# Patient Record
Sex: FEMALE | Race: WHITE | NOT HISPANIC OR LATINO | Employment: OTHER | URBAN - METROPOLITAN AREA
[De-identification: names, ages, dates, MRNs, and addresses within clinical notes are randomized per-mention and may not be internally consistent; named-entity substitution may affect disease eponyms.]

---

## 2020-10-27 ENCOUNTER — TRANSFERRED RECORDS (OUTPATIENT)
Dept: HEALTH INFORMATION MANAGEMENT | Facility: CLINIC | Age: 75
End: 2020-10-27

## 2020-10-28 ENCOUNTER — TRANSFERRED RECORDS (OUTPATIENT)
Dept: HEALTH INFORMATION MANAGEMENT | Facility: CLINIC | Age: 75
End: 2020-10-28

## 2020-12-04 ENCOUNTER — TRANSFERRED RECORDS (OUTPATIENT)
Dept: HEALTH INFORMATION MANAGEMENT | Facility: CLINIC | Age: 75
End: 2020-12-04

## 2022-10-15 ENCOUNTER — TRANSFERRED RECORDS (OUTPATIENT)
Dept: HEALTH INFORMATION MANAGEMENT | Facility: CLINIC | Age: 77
End: 2022-10-15

## 2022-10-16 ENCOUNTER — TRANSFERRED RECORDS (OUTPATIENT)
Dept: HEALTH INFORMATION MANAGEMENT | Facility: CLINIC | Age: 77
End: 2022-10-16

## 2022-10-17 ENCOUNTER — TRANSFERRED RECORDS (OUTPATIENT)
Dept: HEALTH INFORMATION MANAGEMENT | Facility: CLINIC | Age: 77
End: 2022-10-17

## 2023-07-24 ENCOUNTER — TRANSFERRED RECORDS (OUTPATIENT)
Dept: HEALTH INFORMATION MANAGEMENT | Facility: CLINIC | Age: 78
End: 2023-07-24

## 2023-09-18 ENCOUNTER — TRANSFERRED RECORDS (OUTPATIENT)
Dept: HEALTH INFORMATION MANAGEMENT | Facility: CLINIC | Age: 78
End: 2023-09-18

## 2023-09-20 ENCOUNTER — TRANSFERRED RECORDS (OUTPATIENT)
Dept: HEALTH INFORMATION MANAGEMENT | Facility: CLINIC | Age: 78
End: 2023-09-20

## 2023-11-30 ENCOUNTER — TRANSFERRED RECORDS (OUTPATIENT)
Dept: HEALTH INFORMATION MANAGEMENT | Facility: CLINIC | Age: 78
End: 2023-11-30

## 2024-01-01 ENCOUNTER — APPOINTMENT (OUTPATIENT)
Dept: SPEECH THERAPY | Facility: SKILLED NURSING FACILITY | Age: 79
DRG: 947 | End: 2024-01-01
Attending: INTERNAL MEDICINE
Payer: MEDICARE

## 2024-01-01 ENCOUNTER — APPOINTMENT (OUTPATIENT)
Dept: PHYSICAL THERAPY | Facility: SKILLED NURSING FACILITY | Age: 79
DRG: 947 | End: 2024-01-01
Attending: INTERNAL MEDICINE
Payer: MEDICARE

## 2024-01-01 ENCOUNTER — APPOINTMENT (OUTPATIENT)
Dept: OCCUPATIONAL THERAPY | Facility: CLINIC | Age: 79
DRG: 871 | End: 2024-01-01
Attending: STUDENT IN AN ORGANIZED HEALTH CARE EDUCATION/TRAINING PROGRAM
Payer: MEDICARE

## 2024-01-01 ENCOUNTER — APPOINTMENT (OUTPATIENT)
Dept: OCCUPATIONAL THERAPY | Facility: SKILLED NURSING FACILITY | Age: 79
DRG: 947 | End: 2024-01-01
Attending: INTERNAL MEDICINE
Payer: MEDICARE

## 2024-01-01 ENCOUNTER — APPOINTMENT (OUTPATIENT)
Dept: PHYSICAL THERAPY | Facility: CLINIC | Age: 79
DRG: 193 | End: 2024-01-01
Attending: STUDENT IN AN ORGANIZED HEALTH CARE EDUCATION/TRAINING PROGRAM
Payer: MEDICARE

## 2024-01-01 ENCOUNTER — APPOINTMENT (OUTPATIENT)
Dept: PHYSICAL THERAPY | Facility: CLINIC | Age: 79
DRG: 871 | End: 2024-01-01
Payer: MEDICARE

## 2024-01-01 ENCOUNTER — HOSPITAL ENCOUNTER (INPATIENT)
Facility: SKILLED NURSING FACILITY | Age: 79
LOS: 7 days | DRG: 947 | End: 2025-01-02
Attending: INTERNAL MEDICINE | Admitting: INTERNAL MEDICINE
Payer: MEDICARE

## 2024-01-01 ENCOUNTER — APPOINTMENT (OUTPATIENT)
Dept: CARDIOLOGY | Facility: CLINIC | Age: 79
DRG: 871 | End: 2024-01-01
Attending: INTERNAL MEDICINE
Payer: MEDICARE

## 2024-01-01 ENCOUNTER — APPOINTMENT (OUTPATIENT)
Dept: GENERAL RADIOLOGY | Facility: CLINIC | Age: 79
DRG: 193 | End: 2024-01-01
Attending: STUDENT IN AN ORGANIZED HEALTH CARE EDUCATION/TRAINING PROGRAM
Payer: MEDICARE

## 2024-01-01 ENCOUNTER — APPOINTMENT (OUTPATIENT)
Dept: OCCUPATIONAL THERAPY | Facility: CLINIC | Age: 79
DRG: 871 | End: 2024-01-01
Payer: MEDICARE

## 2024-01-01 LAB
ALBUMIN UR-MCNC: 10 MG/DL
ANION GAP SERPL CALCULATED.3IONS-SCNC: 6 MMOL/L (ref 7–15)
APPEARANCE UR: ABNORMAL
BILIRUB UR QL STRIP: NEGATIVE
BUN SERPL-MCNC: 13.9 MG/DL (ref 8–23)
CALCIUM SERPL-MCNC: 9.8 MG/DL (ref 8.8–10.4)
CHLORIDE SERPL-SCNC: 97 MMOL/L (ref 98–107)
COLOR UR AUTO: YELLOW
CREAT SERPL-MCNC: 0.49 MG/DL (ref 0.51–0.95)
CYSTATIN C (ROCHE): 1.3 MG/L (ref 0.6–1)
EGFRCR SERPLBLD CKD-EPI 2021: >90 ML/MIN/1.73M2
ERYTHROCYTE [DISTWIDTH] IN BLOOD BY AUTOMATED COUNT: 16.9 % (ref 10–15)
FLUAV RNA SPEC QL NAA+PROBE: NEGATIVE
FLUBV RNA RESP QL NAA+PROBE: NEGATIVE
GFR/BSA.PRED SERPLBLD CYS-BASED-ARV: 47 ML/MIN/1.73M2
GLUCOSE SERPL-MCNC: 104 MG/DL (ref 70–99)
GLUCOSE UR STRIP-MCNC: NEGATIVE MG/DL
HCO3 SERPL-SCNC: 38 MMOL/L (ref 22–29)
HCT VFR BLD AUTO: 29.9 % (ref 35–47)
HGB BLD-MCNC: 8.9 G/DL (ref 11.7–15.7)
HGB UR QL STRIP: NEGATIVE
KETONES UR STRIP-MCNC: NEGATIVE MG/DL
LEUKOCYTE ESTERASE UR QL STRIP: NEGATIVE
MCH RBC QN AUTO: 25.8 PG (ref 26.5–33)
MCHC RBC AUTO-ENTMCNC: 29.8 G/DL (ref 31.5–36.5)
MCV RBC AUTO: 87 FL (ref 78–100)
MUCOUS THREADS #/AREA URNS LPF: PRESENT /LPF
NITRATE UR QL: NEGATIVE
PH UR STRIP: 7.5 [PH] (ref 5–7)
PLATELET # BLD AUTO: 256 10E3/UL (ref 150–450)
POTASSIUM SERPL-SCNC: 4.3 MMOL/L (ref 3.4–5.3)
RBC # BLD AUTO: 3.45 10E6/UL (ref 3.8–5.2)
RBC URINE: 1 /HPF
RSV RNA SPEC NAA+PROBE: NEGATIVE
SARS-COV-2 RNA RESP QL NAA+PROBE: NEGATIVE
SODIUM SERPL-SCNC: 141 MMOL/L (ref 135–145)
SP GR UR STRIP: 1.01 (ref 1–1.03)
SQUAMOUS EPITHELIAL: 1 /HPF
UROBILINOGEN UR STRIP-MCNC: NORMAL MG/DL
WBC # BLD AUTO: 10.2 10E3/UL (ref 4–11)
WBC URINE: 1 /HPF

## 2024-01-01 PROCEDURE — 96125 COGNITIVE TEST BY HC PRO: CPT | Mod: GN | Performed by: SPEECH-LANGUAGE PATHOLOGIST

## 2024-01-01 PROCEDURE — 120N000009 HC R&B SNF

## 2024-01-01 PROCEDURE — 85018 HEMOGLOBIN: CPT | Performed by: INTERNAL MEDICINE

## 2024-01-01 PROCEDURE — 93306 TTE W/DOPPLER COMPLETE: CPT | Mod: 26 | Performed by: INTERNAL MEDICINE

## 2024-01-01 PROCEDURE — 80048 BASIC METABOLIC PNL TOTAL CA: CPT | Performed by: INTERNAL MEDICINE

## 2024-01-01 PROCEDURE — 87637 SARSCOV2&INF A&B&RSV AMP PRB: CPT | Performed by: INTERNAL MEDICINE

## 2024-01-01 PROCEDURE — 250N000013 HC RX MED GY IP 250 OP 250 PS 637: Performed by: INTERNAL MEDICINE

## 2024-01-01 PROCEDURE — 81001 URINALYSIS AUTO W/SCOPE: CPT | Performed by: INTERNAL MEDICINE

## 2024-01-01 PROCEDURE — 99309 SBSQ NF CARE MODERATE MDM 30: CPT | Performed by: INTERNAL MEDICINE

## 2024-01-01 PROCEDURE — 97535 SELF CARE MNGMENT TRAINING: CPT | Mod: GO | Performed by: OCCUPATIONAL THERAPIST

## 2024-01-01 PROCEDURE — 97161 PT EVAL LOW COMPLEX 20 MIN: CPT | Mod: GP

## 2024-01-01 PROCEDURE — 93306 TTE W/DOPPLER COMPLETE: CPT

## 2024-01-01 PROCEDURE — 92507 TX SP LANG VOICE COMM INDIV: CPT | Mod: GN | Performed by: SPEECH-LANGUAGE PATHOLOGIST

## 2024-01-01 PROCEDURE — 97165 OT EVAL LOW COMPLEX 30 MIN: CPT | Mod: GO

## 2024-01-01 PROCEDURE — G0463 HOSPITAL OUTPT CLINIC VISIT: HCPCS

## 2024-01-01 PROCEDURE — 36415 COLL VENOUS BLD VENIPUNCTURE: CPT | Performed by: INTERNAL MEDICINE

## 2024-01-01 PROCEDURE — 250N000011 HC RX IP 250 OP 636: Mod: JZ | Performed by: INTERNAL MEDICINE

## 2024-01-01 PROCEDURE — 71046 X-RAY EXAM CHEST 2 VIEWS: CPT

## 2024-01-01 PROCEDURE — 82610 CYSTATIN C: CPT | Performed by: INTERNAL MEDICINE

## 2024-01-01 PROCEDURE — 97530 THERAPEUTIC ACTIVITIES: CPT | Mod: GP

## 2024-01-01 PROCEDURE — 250N000009 HC RX 250: Performed by: INTERNAL MEDICINE

## 2024-01-01 PROCEDURE — 97535 SELF CARE MNGMENT TRAINING: CPT | Mod: GO

## 2024-01-01 PROCEDURE — 99306 1ST NF CARE HIGH MDM 50: CPT | Mod: AI | Performed by: INTERNAL MEDICINE

## 2024-01-01 PROCEDURE — P9047 ALBUMIN (HUMAN), 25%, 50ML: HCPCS | Mod: JZ | Performed by: INTERNAL MEDICINE

## 2024-01-01 PROCEDURE — 258N000003 HC RX IP 258 OP 636: Performed by: INTERNAL MEDICINE

## 2024-01-01 PROCEDURE — 71046 X-RAY EXAM CHEST 2 VIEWS: CPT | Mod: 26 | Performed by: RADIOLOGY

## 2024-01-01 RX ORDER — DICYCLOMINE HYDROCHLORIDE 10 MG/1
10 CAPSULE ORAL 3 TIMES DAILY
Status: DISCONTINUED | OUTPATIENT
Start: 2024-01-01 | End: 2024-01-01

## 2024-01-01 RX ORDER — PANTOPRAZOLE SODIUM 40 MG/1
40 TABLET, DELAYED RELEASE ORAL
Status: DISCONTINUED | OUTPATIENT
Start: 2024-01-01 | End: 2025-01-01 | Stop reason: HOSPADM

## 2024-01-01 RX ORDER — OSELTAMIVIR PHOSPHATE 30 MG/1
60 CAPSULE ORAL DAILY
Status: DISCONTINUED | OUTPATIENT
Start: 2024-01-01 | End: 2024-01-01

## 2024-01-01 RX ORDER — AMOXICILLIN 250 MG
1 CAPSULE ORAL 2 TIMES DAILY PRN
Status: DISCONTINUED | OUTPATIENT
Start: 2024-01-01 | End: 2025-01-01 | Stop reason: HOSPADM

## 2024-01-01 RX ORDER — ATORVASTATIN CALCIUM 40 MG/1
40 TABLET, FILM COATED ORAL DAILY
Status: DISCONTINUED | OUTPATIENT
Start: 2024-01-01 | End: 2024-01-01

## 2024-01-01 RX ORDER — AMOXICILLIN 250 MG
2 CAPSULE ORAL 2 TIMES DAILY PRN
Status: DISCONTINUED | OUTPATIENT
Start: 2024-01-01 | End: 2025-01-01 | Stop reason: HOSPADM

## 2024-01-01 RX ORDER — GUAIFENESIN 200 MG/10ML
200 LIQUID ORAL 2 TIMES DAILY PRN
Status: DISCONTINUED | OUTPATIENT
Start: 2024-01-01 | End: 2025-01-01 | Stop reason: HOSPADM

## 2024-01-01 RX ORDER — FERROUS SULFATE 325(65) MG
325 TABLET ORAL DAILY
Status: DISCONTINUED | OUTPATIENT
Start: 2024-01-01 | End: 2025-01-01 | Stop reason: HOSPADM

## 2024-01-01 RX ORDER — ACETAMINOPHEN 650 MG/1
650 SUPPOSITORY RECTAL EVERY 4 HOURS PRN
Status: DISCONTINUED | OUTPATIENT
Start: 2024-01-01 | End: 2025-01-01 | Stop reason: HOSPADM

## 2024-01-01 RX ORDER — OSELTAMIVIR PHOSPHATE 30 MG/1
30 CAPSULE ORAL DAILY
Status: DISCONTINUED | OUTPATIENT
Start: 2024-01-01 | End: 2025-01-01 | Stop reason: HOSPADM

## 2024-01-01 RX ORDER — MECLIZINE HCL 12.5 MG 12.5 MG/1
12.5 TABLET ORAL 3 TIMES DAILY PRN
Status: DISCONTINUED | OUTPATIENT
Start: 2024-01-01 | End: 2025-01-01 | Stop reason: HOSPADM

## 2024-01-01 RX ORDER — MIRTAZAPINE 7.5 MG/1
15 TABLET, FILM COATED ORAL AT BEDTIME
Status: DISCONTINUED | OUTPATIENT
Start: 2024-01-01 | End: 2024-01-01

## 2024-01-01 RX ORDER — PREGABALIN 50 MG/1
50 CAPSULE ORAL 2 TIMES DAILY
Status: DISCONTINUED | OUTPATIENT
Start: 2024-01-01 | End: 2025-01-01 | Stop reason: HOSPADM

## 2024-01-01 RX ORDER — FERROUS SULFATE 325(65) MG
325 TABLET ORAL DAILY
Status: DISCONTINUED | OUTPATIENT
Start: 2024-01-01 | End: 2024-01-01

## 2024-01-01 RX ORDER — ASPIRIN 81 MG/1
81 TABLET ORAL DAILY
Status: DISCONTINUED | OUTPATIENT
Start: 2024-01-01 | End: 2024-01-01

## 2024-01-01 RX ORDER — CETIRIZINE HYDROCHLORIDE 5 MG/1
10 TABLET ORAL DAILY
Status: DISCONTINUED | OUTPATIENT
Start: 2024-01-01 | End: 2024-01-01

## 2024-01-01 RX ORDER — POLYETHYLENE GLYCOL 3350 17 G/17G
17 POWDER, FOR SOLUTION ORAL DAILY
Status: DISCONTINUED | OUTPATIENT
Start: 2024-01-01 | End: 2025-01-01 | Stop reason: HOSPADM

## 2024-01-01 RX ORDER — ALENDRONATE SODIUM 70 MG/1
70 TABLET ORAL WEEKLY
Status: DISCONTINUED | OUTPATIENT
Start: 2024-01-01 | End: 2024-01-01

## 2024-01-01 RX ORDER — SODIUM CHLORIDE, SODIUM LACTATE, POTASSIUM CHLORIDE, CALCIUM CHLORIDE 600; 310; 30; 20 MG/100ML; MG/100ML; MG/100ML; MG/100ML
INJECTION, SOLUTION INTRAVENOUS
Status: COMPLETED
Start: 2024-01-01 | End: 2024-01-01

## 2024-01-01 RX ORDER — MULTIPLE VITAMINS W/ MINERALS TAB 9MG-400MCG
1 TAB ORAL DAILY
Status: DISCONTINUED | OUTPATIENT
Start: 2024-01-01 | End: 2024-01-01

## 2024-01-01 RX ORDER — MIRTAZAPINE 7.5 MG/1
15 TABLET, FILM COATED ORAL AT BEDTIME
Status: DISCONTINUED | OUTPATIENT
Start: 2024-01-01 | End: 2025-01-01 | Stop reason: HOSPADM

## 2024-01-01 RX ORDER — ONDANSETRON 2 MG/ML
4 INJECTION INTRAMUSCULAR; INTRAVENOUS EVERY 6 HOURS PRN
Status: DISCONTINUED | OUTPATIENT
Start: 2024-01-01 | End: 2025-01-01 | Stop reason: HOSPADM

## 2024-01-01 RX ORDER — FAMOTIDINE 20 MG/1
20 TABLET, FILM COATED ORAL 2 TIMES DAILY PRN
Status: DISCONTINUED | OUTPATIENT
Start: 2024-01-01 | End: 2025-01-01 | Stop reason: HOSPADM

## 2024-01-01 RX ORDER — MULTIVITAMIN WITH IRON
500 TABLET ORAL DAILY
Status: DISCONTINUED | OUTPATIENT
Start: 2024-01-01 | End: 2024-01-01

## 2024-01-01 RX ORDER — LIDOCAINE 40 MG/G
CREAM TOPICAL
Status: DISCONTINUED | OUTPATIENT
Start: 2024-01-01 | End: 2025-01-01 | Stop reason: HOSPADM

## 2024-01-01 RX ORDER — ONDANSETRON 4 MG/1
4 TABLET, ORALLY DISINTEGRATING ORAL EVERY 6 HOURS PRN
Status: DISCONTINUED | OUTPATIENT
Start: 2024-01-01 | End: 2025-01-01 | Stop reason: HOSPADM

## 2024-01-01 RX ORDER — FOLIC ACID 1 MG/1
1000 TABLET ORAL DAILY
Status: DISCONTINUED | OUTPATIENT
Start: 2024-01-01 | End: 2024-01-01

## 2024-01-01 RX ORDER — BISACODYL 10 MG
10 SUPPOSITORY, RECTAL RECTAL DAILY PRN
Status: DISCONTINUED | OUTPATIENT
Start: 2024-01-01 | End: 2025-01-01 | Stop reason: HOSPADM

## 2024-01-01 RX ORDER — IPRATROPIUM BROMIDE AND ALBUTEROL SULFATE 2.5; .5 MG/3ML; MG/3ML
3 SOLUTION RESPIRATORY (INHALATION) EVERY 4 HOURS PRN
Status: DISCONTINUED | OUTPATIENT
Start: 2024-01-01 | End: 2025-01-01 | Stop reason: HOSPADM

## 2024-01-01 RX ORDER — MAGNESIUM OXIDE 400 MG/1
400 TABLET ORAL DAILY
Status: DISCONTINUED | OUTPATIENT
Start: 2024-01-01 | End: 2024-01-01

## 2024-01-01 RX ORDER — MULTIPLE VITAMINS W/ MINERALS TAB 9MG-400MCG
1 TAB ORAL DAILY
Status: DISCONTINUED | OUTPATIENT
Start: 2024-01-01 | End: 2025-01-01 | Stop reason: HOSPADM

## 2024-01-01 RX ORDER — CETIRIZINE HYDROCHLORIDE 5 MG/1
10 TABLET ORAL DAILY
Status: DISCONTINUED | OUTPATIENT
Start: 2024-01-01 | End: 2025-01-01 | Stop reason: HOSPADM

## 2024-01-01 RX ORDER — ALBUMIN (HUMAN) 12.5 G/50ML
50 SOLUTION INTRAVENOUS ONCE
Status: COMPLETED | OUTPATIENT
Start: 2024-01-01 | End: 2024-01-01

## 2024-01-01 RX ORDER — MAGNESIUM OXIDE 400 MG/1
400 TABLET ORAL DAILY
Status: DISCONTINUED | OUTPATIENT
Start: 2024-01-01 | End: 2025-01-01 | Stop reason: HOSPADM

## 2024-01-01 RX ORDER — FOLIC ACID 1 MG/1
1000 TABLET ORAL DAILY
Status: DISCONTINUED | OUTPATIENT
Start: 2024-01-01 | End: 2025-01-01 | Stop reason: HOSPADM

## 2024-01-01 RX ORDER — ATORVASTATIN CALCIUM 40 MG/1
40 TABLET, FILM COATED ORAL DAILY
Status: DISCONTINUED | OUTPATIENT
Start: 2024-01-01 | End: 2025-01-01 | Stop reason: HOSPADM

## 2024-01-01 RX ORDER — ASPIRIN 81 MG/1
81 TABLET ORAL DAILY
Status: DISCONTINUED | OUTPATIENT
Start: 2024-01-01 | End: 2025-01-01 | Stop reason: HOSPADM

## 2024-01-01 RX ORDER — ACETAMINOPHEN 325 MG/1
650 TABLET ORAL EVERY 4 HOURS PRN
Status: DISCONTINUED | OUTPATIENT
Start: 2024-01-01 | End: 2025-01-01 | Stop reason: HOSPADM

## 2024-01-01 RX ORDER — TRAZODONE HYDROCHLORIDE 50 MG/1
100 TABLET, FILM COATED ORAL AT BEDTIME
Status: DISCONTINUED | OUTPATIENT
Start: 2024-01-01 | End: 2025-01-01 | Stop reason: HOSPADM

## 2024-01-01 RX ORDER — DICYCLOMINE HYDROCHLORIDE 10 MG/1
10 CAPSULE ORAL 3 TIMES DAILY
Status: DISCONTINUED | OUTPATIENT
Start: 2024-01-01 | End: 2025-01-01 | Stop reason: HOSPADM

## 2024-01-01 RX ORDER — PANTOPRAZOLE SODIUM 40 MG/1
40 TABLET, DELAYED RELEASE ORAL
Status: DISCONTINUED | OUTPATIENT
Start: 2024-01-01 | End: 2024-01-01

## 2024-01-01 RX ORDER — MULTIVITAMIN WITH IRON
500 TABLET ORAL DAILY
Status: DISCONTINUED | OUTPATIENT
Start: 2024-01-01 | End: 2025-01-01 | Stop reason: HOSPADM

## 2024-01-01 RX ADMIN — MIRTAZAPINE 15 MG: 7.5 TABLET, FILM COATED ORAL at 21:13

## 2024-01-01 RX ADMIN — MAGNESIUM OXIDE TAB 400 MG (241.3 MG ELEMENTAL MG) 400 MG: 400 (241.3 MG) TAB at 08:15

## 2024-01-01 RX ADMIN — DICYCLOMINE HYDROCHLORIDE 10 MG: 10 CAPSULE ORAL at 15:01

## 2024-01-01 RX ADMIN — Medication 1 TABLET: at 08:15

## 2024-01-01 RX ADMIN — DICYCLOMINE HYDROCHLORIDE 10 MG: 10 CAPSULE ORAL at 08:14

## 2024-01-01 RX ADMIN — Medication 1 TABLET: at 08:19

## 2024-01-01 RX ADMIN — DICYCLOMINE HYDROCHLORIDE 10 MG: 10 CAPSULE ORAL at 13:24

## 2024-01-01 RX ADMIN — Medication 12.5 MG: at 21:17

## 2024-01-01 RX ADMIN — FOLIC ACID 1000 MCG: 1 TABLET ORAL at 09:41

## 2024-01-01 RX ADMIN — PREGABALIN 50 MG: 50 CAPSULE ORAL at 21:14

## 2024-01-01 RX ADMIN — FERROUS SULFATE TAB 325 MG (65 MG ELEMENTAL FE) 325 MG: 325 (65 FE) TAB at 09:42

## 2024-01-01 RX ADMIN — MAGNESIUM OXIDE TAB 400 MG (241.3 MG ELEMENTAL MG) 400 MG: 400 (241.3 MG) TAB at 09:42

## 2024-01-01 RX ADMIN — DICYCLOMINE HYDROCHLORIDE 10 MG: 10 CAPSULE ORAL at 08:20

## 2024-01-01 RX ADMIN — CYANOCOBALAMIN TAB 500 MCG 500 MCG: 500 TAB at 08:20

## 2024-01-01 RX ADMIN — PREGABALIN 50 MG: 50 CAPSULE ORAL at 21:17

## 2024-01-01 RX ADMIN — OSELTAMIVIR PHOSPHATE 30 MG: 30 CAPSULE ORAL at 09:42

## 2024-01-01 RX ADMIN — PANTOPRAZOLE SODIUM 40 MG: 40 TABLET, DELAYED RELEASE ORAL at 06:52

## 2024-01-01 RX ADMIN — THIAMINE HCL TAB 100 MG 100 MG: 100 TAB at 15:01

## 2024-01-01 RX ADMIN — TRAZODONE HYDROCHLORIDE 100 MG: 50 TABLET ORAL at 21:17

## 2024-01-01 RX ADMIN — CYANOCOBALAMIN TAB 500 MCG 500 MCG: 500 TAB at 08:15

## 2024-01-01 RX ADMIN — ALBUMIN HUMAN 50 G: 0.25 SOLUTION INTRAVENOUS at 17:00

## 2024-01-01 RX ADMIN — TRAZODONE HYDROCHLORIDE 100 MG: 50 TABLET ORAL at 22:09

## 2024-01-01 RX ADMIN — DICYCLOMINE HYDROCHLORIDE 10 MG: 10 CAPSULE ORAL at 20:56

## 2024-01-01 RX ADMIN — PREGABALIN 50 MG: 50 CAPSULE ORAL at 20:32

## 2024-01-01 RX ADMIN — MIRTAZAPINE 15 MG: 7.5 TABLET, FILM COATED ORAL at 21:54

## 2024-01-01 RX ADMIN — PREGABALIN 50 MG: 50 CAPSULE ORAL at 08:38

## 2024-01-01 RX ADMIN — PREGABALIN 50 MG: 50 CAPSULE ORAL at 20:41

## 2024-01-01 RX ADMIN — FERROUS SULFATE TAB 325 MG (65 MG ELEMENTAL FE) 325 MG: 325 (65 FE) TAB at 08:19

## 2024-01-01 RX ADMIN — CETIRIZINE HYDROCHLORIDE 10 MG: 5 TABLET ORAL at 13:24

## 2024-01-01 RX ADMIN — PREGABALIN 50 MG: 50 CAPSULE ORAL at 08:15

## 2024-01-01 RX ADMIN — PREGABALIN 50 MG: 50 CAPSULE ORAL at 20:56

## 2024-01-01 RX ADMIN — CETIRIZINE HYDROCHLORIDE 10 MG: 5 TABLET ORAL at 14:44

## 2024-01-01 RX ADMIN — Medication 1 TABLET: at 08:38

## 2024-01-01 RX ADMIN — PANTOPRAZOLE SODIUM 40 MG: 40 TABLET, DELAYED RELEASE ORAL at 16:55

## 2024-01-01 RX ADMIN — MAGNESIUM OXIDE TAB 400 MG (241.3 MG ELEMENTAL MG) 400 MG: 400 (241.3 MG) TAB at 08:38

## 2024-01-01 RX ADMIN — TUBERCULIN PURIFIED PROTEIN DERIVATIVE 5 UNITS: 5 INJECTION, SOLUTION INTRADERMAL at 10:15

## 2024-01-01 RX ADMIN — PANTOPRAZOLE SODIUM 40 MG: 40 TABLET, DELAYED RELEASE ORAL at 06:17

## 2024-01-01 RX ADMIN — ATORVASTATIN CALCIUM 40 MG: 40 TABLET, FILM COATED ORAL at 09:42

## 2024-01-01 RX ADMIN — CYANOCOBALAMIN TAB 500 MCG 500 MCG: 500 TAB at 10:02

## 2024-01-01 RX ADMIN — OSELTAMIVIR PHOSPHATE 60 MG: 30 CAPSULE ORAL at 10:52

## 2024-01-01 RX ADMIN — FOLIC ACID 1000 MCG: 1 TABLET ORAL at 10:02

## 2024-01-01 RX ADMIN — DICYCLOMINE HYDROCHLORIDE 10 MG: 10 CAPSULE ORAL at 21:14

## 2024-01-01 RX ADMIN — TRAZODONE HYDROCHLORIDE 100 MG: 50 TABLET ORAL at 21:13

## 2024-01-01 RX ADMIN — TRAZODONE HYDROCHLORIDE 100 MG: 50 TABLET ORAL at 21:14

## 2024-01-01 RX ADMIN — DICYCLOMINE HYDROCHLORIDE 10 MG: 10 CAPSULE ORAL at 20:41

## 2024-01-01 RX ADMIN — ASPIRIN 81 MG: 81 TABLET, COATED ORAL at 08:19

## 2024-01-01 RX ADMIN — ATORVASTATIN CALCIUM 40 MG: 40 TABLET, FILM COATED ORAL at 08:39

## 2024-01-01 RX ADMIN — THIAMINE HCL TAB 100 MG 100 MG: 100 TAB at 14:44

## 2024-01-01 RX ADMIN — ASPIRIN 81 MG: 81 TABLET, COATED ORAL at 08:40

## 2024-01-01 RX ADMIN — POLYETHYLENE GLYCOL 3350 17 G: 17 POWDER, FOR SOLUTION ORAL at 08:15

## 2024-01-01 RX ADMIN — DICYCLOMINE HYDROCHLORIDE 10 MG: 10 CAPSULE ORAL at 09:40

## 2024-01-01 RX ADMIN — THIAMINE HCL TAB 100 MG 100 MG: 100 TAB at 13:24

## 2024-01-01 RX ADMIN — POLYETHYLENE GLYCOL 3350 17 G: 17 POWDER, FOR SOLUTION ORAL at 08:16

## 2024-01-01 RX ADMIN — DICYCLOMINE HYDROCHLORIDE 10 MG: 10 CAPSULE ORAL at 20:32

## 2024-01-01 RX ADMIN — DICYCLOMINE HYDROCHLORIDE 10 MG: 10 CAPSULE ORAL at 15:03

## 2024-01-01 RX ADMIN — CETIRIZINE HYDROCHLORIDE 10 MG: 5 TABLET ORAL at 14:24

## 2024-01-01 RX ADMIN — MAGNESIUM OXIDE TAB 400 MG (241.3 MG ELEMENTAL MG) 400 MG: 400 (241.3 MG) TAB at 10:02

## 2024-01-01 RX ADMIN — Medication 12.5 MG: at 08:15

## 2024-01-01 RX ADMIN — CETIRIZINE HYDROCHLORIDE 10 MG: 5 TABLET ORAL at 15:01

## 2024-01-01 RX ADMIN — ASPIRIN 81 MG: 81 TABLET, COATED ORAL at 09:41

## 2024-01-01 RX ADMIN — DICYCLOMINE HYDROCHLORIDE 10 MG: 10 CAPSULE ORAL at 14:24

## 2024-01-01 RX ADMIN — DICYCLOMINE HYDROCHLORIDE 10 MG: 10 CAPSULE ORAL at 08:39

## 2024-01-01 RX ADMIN — DICYCLOMINE HYDROCHLORIDE 10 MG: 10 CAPSULE ORAL at 13:00

## 2024-01-01 RX ADMIN — FOLIC ACID 1000 MCG: 1 TABLET ORAL at 08:38

## 2024-01-01 RX ADMIN — PANTOPRAZOLE SODIUM 40 MG: 40 TABLET, DELAYED RELEASE ORAL at 17:58

## 2024-01-01 RX ADMIN — MIRTAZAPINE 15 MG: 7.5 TABLET, FILM COATED ORAL at 21:14

## 2024-01-01 RX ADMIN — PANTOPRAZOLE SODIUM 40 MG: 40 TABLET, DELAYED RELEASE ORAL at 09:41

## 2024-01-01 RX ADMIN — DICYCLOMINE HYDROCHLORIDE 10 MG: 10 CAPSULE ORAL at 19:17

## 2024-01-01 RX ADMIN — PREGABALIN 50 MG: 50 CAPSULE ORAL at 08:19

## 2024-01-01 RX ADMIN — CETIRIZINE HYDROCHLORIDE 10 MG: 5 TABLET ORAL at 13:00

## 2024-01-01 RX ADMIN — FOLIC ACID 1000 MCG: 1 TABLET ORAL at 08:15

## 2024-01-01 RX ADMIN — ATORVASTATIN CALCIUM 40 MG: 40 TABLET, FILM COATED ORAL at 08:20

## 2024-01-01 RX ADMIN — MIRTAZAPINE 15 MG: 7.5 TABLET, FILM COATED ORAL at 21:17

## 2024-01-01 RX ADMIN — TRAZODONE HYDROCHLORIDE 100 MG: 50 TABLET ORAL at 21:54

## 2024-01-01 RX ADMIN — Medication 12.5 MG: at 20:56

## 2024-01-01 RX ADMIN — PREGABALIN 50 MG: 50 CAPSULE ORAL at 09:41

## 2024-01-01 RX ADMIN — THIAMINE HCL TAB 100 MG 100 MG: 100 TAB at 14:24

## 2024-01-01 RX ADMIN — ATORVASTATIN CALCIUM 40 MG: 40 TABLET, FILM COATED ORAL at 08:15

## 2024-01-01 RX ADMIN — SODIUM CHLORIDE, POTASSIUM CHLORIDE, SODIUM LACTATE AND CALCIUM CHLORIDE 250 ML: 600; 310; 30; 20 INJECTION, SOLUTION INTRAVENOUS at 16:35

## 2024-01-01 RX ADMIN — TRAZODONE HYDROCHLORIDE 100 MG: 50 TABLET ORAL at 21:09

## 2024-01-01 RX ADMIN — FERROUS SULFATE TAB 325 MG (65 MG ELEMENTAL FE) 325 MG: 325 (65 FE) TAB at 08:15

## 2024-01-01 RX ADMIN — POLYETHYLENE GLYCOL 3350 17 G: 17 POWDER, FOR SOLUTION ORAL at 08:38

## 2024-01-01 RX ADMIN — OSELTAMIVIR PHOSPHATE 60 MG: 30 CAPSULE ORAL at 14:44

## 2024-01-01 RX ADMIN — PREGABALIN 50 MG: 50 CAPSULE ORAL at 21:54

## 2024-01-01 RX ADMIN — ACETAMINOPHEN 650 MG: 650 SUPPOSITORY RECTAL at 15:01

## 2024-01-01 RX ADMIN — SENNOSIDES AND DOCUSATE SODIUM 1 TABLET: 50; 8.6 TABLET ORAL at 06:17

## 2024-01-01 RX ADMIN — CYANOCOBALAMIN TAB 500 MCG 500 MCG: 500 TAB at 09:41

## 2024-01-01 RX ADMIN — Medication 12.5 MG: at 21:53

## 2024-01-01 RX ADMIN — DICYCLOMINE HYDROCHLORIDE 10 MG: 10 CAPSULE ORAL at 10:02

## 2024-01-01 RX ADMIN — OSELTAMIVIR PHOSPHATE 60 MG: 30 CAPSULE ORAL at 08:15

## 2024-01-01 RX ADMIN — ACETAMINOPHEN 650 MG: 325 TABLET, FILM COATED ORAL at 09:40

## 2024-01-01 RX ADMIN — SENNOSIDES AND DOCUSATE SODIUM 1 TABLET: 50; 8.6 TABLET ORAL at 06:36

## 2024-01-01 RX ADMIN — DICYCLOMINE HYDROCHLORIDE 10 MG: 10 CAPSULE ORAL at 21:17

## 2024-01-01 RX ADMIN — PANTOPRAZOLE SODIUM 40 MG: 40 TABLET, DELAYED RELEASE ORAL at 16:34

## 2024-01-01 RX ADMIN — PANTOPRAZOLE SODIUM 40 MG: 40 TABLET, DELAYED RELEASE ORAL at 19:17

## 2024-01-01 RX ADMIN — DICYCLOMINE HYDROCHLORIDE 10 MG: 10 CAPSULE ORAL at 14:44

## 2024-01-01 RX ADMIN — PANTOPRAZOLE SODIUM 40 MG: 40 TABLET, DELAYED RELEASE ORAL at 17:05

## 2024-01-01 RX ADMIN — ASPIRIN 81 MG: 81 TABLET, COATED ORAL at 10:01

## 2024-01-01 RX ADMIN — OSELTAMIVIR PHOSPHATE 60 MG: 30 CAPSULE ORAL at 08:41

## 2024-01-01 RX ADMIN — Medication 12.5 MG: at 09:41

## 2024-01-01 RX ADMIN — PANTOPRAZOLE SODIUM 40 MG: 40 TABLET, DELAYED RELEASE ORAL at 16:25

## 2024-01-01 RX ADMIN — FOLIC ACID 1000 MCG: 1 TABLET ORAL at 08:20

## 2024-01-01 RX ADMIN — PANTOPRAZOLE SODIUM 40 MG: 40 TABLET, DELAYED RELEASE ORAL at 07:12

## 2024-01-01 RX ADMIN — MIRTAZAPINE 15 MG: 7.5 TABLET, FILM COATED ORAL at 22:08

## 2024-01-01 RX ADMIN — Medication 12.5 MG: at 08:16

## 2024-01-01 RX ADMIN — ACETAMINOPHEN 650 MG: 325 TABLET, FILM COATED ORAL at 08:14

## 2024-01-01 RX ADMIN — PANTOPRAZOLE SODIUM 40 MG: 40 TABLET, DELAYED RELEASE ORAL at 06:36

## 2024-01-01 RX ADMIN — Medication 1 TABLET: at 09:41

## 2024-01-01 RX ADMIN — POLYETHYLENE GLYCOL 3350 17 G: 17 POWDER, FOR SOLUTION ORAL at 09:42

## 2024-01-01 RX ADMIN — MIRTAZAPINE 15 MG: 7.5 TABLET, FILM COATED ORAL at 21:09

## 2024-01-01 RX ADMIN — Medication 1 TABLET: at 10:02

## 2024-01-01 RX ADMIN — FERROUS SULFATE TAB 325 MG (65 MG ELEMENTAL FE) 325 MG: 325 (65 FE) TAB at 10:02

## 2024-01-01 RX ADMIN — CETIRIZINE HYDROCHLORIDE 10 MG: 5 TABLET ORAL at 15:03

## 2024-01-01 RX ADMIN — FERROUS SULFATE TAB 325 MG (65 MG ELEMENTAL FE) 325 MG: 325 (65 FE) TAB at 08:38

## 2024-01-01 RX ADMIN — ASPIRIN 81 MG: 81 TABLET, COATED ORAL at 08:15

## 2024-01-01 RX ADMIN — PREGABALIN 50 MG: 50 CAPSULE ORAL at 10:02

## 2024-01-01 RX ADMIN — ATORVASTATIN CALCIUM 40 MG: 40 TABLET, FILM COATED ORAL at 10:02

## 2024-01-01 RX ADMIN — BISACODYL 10 MG: 10 SUPPOSITORY RECTAL at 20:41

## 2024-01-01 RX ADMIN — Medication 12.5 MG: at 08:39

## 2024-01-01 RX ADMIN — MAGNESIUM OXIDE TAB 400 MG (241.3 MG ELEMENTAL MG) 400 MG: 400 (241.3 MG) TAB at 08:19

## 2024-01-01 RX ADMIN — CYANOCOBALAMIN TAB 500 MCG 500 MCG: 500 TAB at 08:39

## 2024-01-01 RX ADMIN — THIAMINE HCL TAB 100 MG 100 MG: 100 TAB at 13:00

## 2024-01-01 ASSESSMENT — ACTIVITIES OF DAILY LIVING (ADL)
ADLS_ACUITY_SCORE: 78
ADLS_ACUITY_SCORE: 77
ADLS_ACUITY_SCORE: 76
ADLS_ACUITY_SCORE: 77
ADLS_ACUITY_SCORE: 78
ADLS_ACUITY_SCORE: 77
ADLS_ACUITY_SCORE: 76
ADLS_ACUITY_SCORE: 77
ADLS_ACUITY_SCORE: 76
ADLS_ACUITY_SCORE: 76
ADLS_ACUITY_SCORE: 77
ADLS_ACUITY_SCORE: 67
ADLS_ACUITY_SCORE: 76
ADLS_ACUITY_SCORE: 77
ADLS_ACUITY_SCORE: 78
ADLS_ACUITY_SCORE: 76
ADLS_ACUITY_SCORE: 77
ADLS_ACUITY_SCORE: 76
ADLS_ACUITY_SCORE: 76
ADLS_ACUITY_SCORE: 78
ADLS_ACUITY_SCORE: 76
ADLS_ACUITY_SCORE: 77
ADLS_ACUITY_SCORE: 78
ADLS_ACUITY_SCORE: 77
ADLS_ACUITY_SCORE: 76
ADLS_ACUITY_SCORE: 77
ADLS_ACUITY_SCORE: 78
ADLS_ACUITY_SCORE: 67
ADLS_ACUITY_SCORE: 76
ADLS_ACUITY_SCORE: 78
ADLS_ACUITY_SCORE: 76
ADLS_ACUITY_SCORE: 69
ADLS_ACUITY_SCORE: 76
ADLS_ACUITY_SCORE: 77
ADLS_ACUITY_SCORE: 77
ADLS_ACUITY_SCORE: 76
ADLS_ACUITY_SCORE: 76
ADLS_ACUITY_SCORE: 77
ADLS_ACUITY_SCORE: 78
ADLS_ACUITY_SCORE: 77
ADLS_ACUITY_SCORE: 76
ADLS_ACUITY_SCORE: 76
ADLS_ACUITY_SCORE: 77
ADLS_ACUITY_SCORE: 77
ADLS_ACUITY_SCORE: 78
ADLS_ACUITY_SCORE: 77
ADLS_ACUITY_SCORE: 77
ADLS_ACUITY_SCORE: 76
ADLS_ACUITY_SCORE: 76
ADLS_ACUITY_SCORE: 77
ADLS_ACUITY_SCORE: 77
ADLS_ACUITY_SCORE: 76
ADLS_ACUITY_SCORE: 78
ADLS_ACUITY_SCORE: 77
ADLS_ACUITY_SCORE: 76
ADLS_ACUITY_SCORE: 77
ADLS_ACUITY_SCORE: 76
ADLS_ACUITY_SCORE: 78
ADLS_ACUITY_SCORE: 78
ADLS_ACUITY_SCORE: 76
ADLS_ACUITY_SCORE: 77
ADLS_ACUITY_SCORE: 77
ADLS_ACUITY_SCORE: 65
ADLS_ACUITY_SCORE: 75
ADLS_ACUITY_SCORE: 78
ADLS_ACUITY_SCORE: 65
ADLS_ACUITY_SCORE: 76
ADLS_ACUITY_SCORE: 77
ADLS_ACUITY_SCORE: 69
ADLS_ACUITY_SCORE: 76
ADLS_ACUITY_SCORE: 78
ADLS_ACUITY_SCORE: 76
ADLS_ACUITY_SCORE: 77
ADLS_ACUITY_SCORE: 77
ADLS_ACUITY_SCORE: 76
ADLS_ACUITY_SCORE: 78
ADLS_ACUITY_SCORE: 77
ADLS_ACUITY_SCORE: 78
ADLS_ACUITY_SCORE: 78
ADLS_ACUITY_SCORE: 77
ADLS_ACUITY_SCORE: 78
ADLS_ACUITY_SCORE: 77
ADLS_ACUITY_SCORE: 60
ADLS_ACUITY_SCORE: 77
ADLS_ACUITY_SCORE: 76
ADLS_ACUITY_SCORE: 76
ADLS_ACUITY_SCORE: 69
ADLS_ACUITY_SCORE: 69
ADLS_ACUITY_SCORE: 78
ADLS_ACUITY_SCORE: 76
ADLS_ACUITY_SCORE: 78
ADLS_ACUITY_SCORE: 76
ADLS_ACUITY_SCORE: 75
ADLS_ACUITY_SCORE: 67
ADLS_ACUITY_SCORE: 76
ADLS_ACUITY_SCORE: 76
ADLS_ACUITY_SCORE: 77
ADLS_ACUITY_SCORE: 76
ADLS_ACUITY_SCORE: 77
ADLS_ACUITY_SCORE: 78
ADLS_ACUITY_SCORE: 75
ADLS_ACUITY_SCORE: 77
ADLS_ACUITY_SCORE: 76
ADLS_ACUITY_SCORE: 76

## 2024-08-22 ENCOUNTER — OFFICE VISIT (OUTPATIENT)
Dept: FAMILY MEDICINE | Facility: CLINIC | Age: 79
End: 2024-08-22
Payer: MEDICARE

## 2024-08-22 VITALS
RESPIRATION RATE: 16 BRPM | DIASTOLIC BLOOD PRESSURE: 78 MMHG | HEIGHT: 61 IN | HEART RATE: 91 BPM | OXYGEN SATURATION: 90 % | WEIGHT: 96.2 LBS | BODY MASS INDEX: 18.16 KG/M2 | SYSTOLIC BLOOD PRESSURE: 128 MMHG | TEMPERATURE: 97.7 F

## 2024-08-22 DIAGNOSIS — M81.0 OSTEOPOROSIS WITHOUT CURRENT PATHOLOGICAL FRACTURE, UNSPECIFIED OSTEOPOROSIS TYPE: ICD-10-CM

## 2024-08-22 DIAGNOSIS — K31.84 GASTROPARESIS: ICD-10-CM

## 2024-08-22 DIAGNOSIS — I10 BENIGN ESSENTIAL HYPERTENSION: ICD-10-CM

## 2024-08-22 DIAGNOSIS — K58.1 IRRITABLE BOWEL SYNDROME WITH CONSTIPATION: ICD-10-CM

## 2024-08-22 DIAGNOSIS — H81.10 BENIGN PAROXYSMAL POSITIONAL VERTIGO, UNSPECIFIED LATERALITY: ICD-10-CM

## 2024-08-22 DIAGNOSIS — G47.00 INSOMNIA, UNSPECIFIED TYPE: ICD-10-CM

## 2024-08-22 DIAGNOSIS — K44.9 HIATAL HERNIA: ICD-10-CM

## 2024-08-22 DIAGNOSIS — R42 DIZZINESS: ICD-10-CM

## 2024-08-22 DIAGNOSIS — Z76.89 ENCOUNTER TO ESTABLISH CARE: Primary | ICD-10-CM

## 2024-08-22 DIAGNOSIS — K21.00 GASTROESOPHAGEAL REFLUX DISEASE WITH ESOPHAGITIS WITHOUT HEMORRHAGE: ICD-10-CM

## 2024-08-22 DIAGNOSIS — E53.8 VITAMIN B12 DEFICIENCY (NON ANEMIC): ICD-10-CM

## 2024-08-22 DIAGNOSIS — D32.9 MENINGIOMA (H): ICD-10-CM

## 2024-08-22 DIAGNOSIS — E78.5 HYPERLIPIDEMIA LDL GOAL <130: ICD-10-CM

## 2024-08-22 DIAGNOSIS — Z13.29 SCREENING FOR THYROID DISORDER: ICD-10-CM

## 2024-08-22 DIAGNOSIS — J84.112 IPF (IDIOPATHIC PULMONARY FIBROSIS) (H): ICD-10-CM

## 2024-08-22 LAB
ALBUMIN SERPL BCG-MCNC: 3.2 G/DL (ref 3.5–5.2)
ALP SERPL-CCNC: 96 U/L (ref 40–150)
ALT SERPL W P-5'-P-CCNC: <5 U/L (ref 0–50)
ANION GAP SERPL CALCULATED.3IONS-SCNC: 9 MMOL/L (ref 7–15)
AST SERPL W P-5'-P-CCNC: 17 U/L (ref 0–45)
BASOPHILS # BLD AUTO: 0 10E3/UL (ref 0–0.2)
BASOPHILS NFR BLD AUTO: 0 %
BILIRUB SERPL-MCNC: 0.2 MG/DL
BUN SERPL-MCNC: 9.1 MG/DL (ref 8–23)
CALCIUM SERPL-MCNC: 9.1 MG/DL (ref 8.8–10.4)
CHLORIDE SERPL-SCNC: 99 MMOL/L (ref 98–107)
CHOLEST SERPL-MCNC: 129 MG/DL
CREAT SERPL-MCNC: 0.6 MG/DL (ref 0.51–0.95)
EGFRCR SERPLBLD CKD-EPI 2021: >90 ML/MIN/1.73M2
EOSINOPHIL # BLD AUTO: 0.2 10E3/UL (ref 0–0.7)
EOSINOPHIL NFR BLD AUTO: 1 %
ERYTHROCYTE [DISTWIDTH] IN BLOOD BY AUTOMATED COUNT: 14.1 % (ref 10–15)
FASTING STATUS PATIENT QL REPORTED: YES
FASTING STATUS PATIENT QL REPORTED: YES
GLUCOSE SERPL-MCNC: 120 MG/DL (ref 70–99)
HCO3 SERPL-SCNC: 32 MMOL/L (ref 22–29)
HCT VFR BLD AUTO: 39.5 % (ref 35–47)
HDLC SERPL-MCNC: 47 MG/DL
HGB BLD-MCNC: 12.2 G/DL (ref 11.7–15.7)
IMM GRANULOCYTES # BLD: 0 10E3/UL
IMM GRANULOCYTES NFR BLD: 0 %
LDLC SERPL CALC-MCNC: 63 MG/DL
LYMPHOCYTES # BLD AUTO: 1 10E3/UL (ref 0.8–5.3)
LYMPHOCYTES NFR BLD AUTO: 7 %
MCH RBC QN AUTO: 27.3 PG (ref 26.5–33)
MCHC RBC AUTO-ENTMCNC: 30.9 G/DL (ref 31.5–36.5)
MCV RBC AUTO: 88 FL (ref 78–100)
MONOCYTES # BLD AUTO: 1.2 10E3/UL (ref 0–1.3)
MONOCYTES NFR BLD AUTO: 10 %
NEUTROPHILS # BLD AUTO: 10.5 10E3/UL (ref 1.6–8.3)
NEUTROPHILS NFR BLD AUTO: 82 %
NONHDLC SERPL-MCNC: 82 MG/DL
PLATELET # BLD AUTO: 342 10E3/UL (ref 150–450)
POTASSIUM SERPL-SCNC: 3.5 MMOL/L (ref 3.4–5.3)
PROT SERPL-MCNC: 7.6 G/DL (ref 6.4–8.3)
RBC # BLD AUTO: 4.47 10E6/UL (ref 3.8–5.2)
SODIUM SERPL-SCNC: 140 MMOL/L (ref 135–145)
TRIGL SERPL-MCNC: 93 MG/DL
TSH SERPL DL<=0.005 MIU/L-ACNC: 1.56 UIU/ML (ref 0.3–4.2)
VIT B12 SERPL-MCNC: 1170 PG/ML (ref 232–1245)
WBC # BLD AUTO: 12.9 10E3/UL (ref 4–11)

## 2024-08-22 PROCEDURE — 85025 COMPLETE CBC W/AUTO DIFF WBC: CPT | Performed by: STUDENT IN AN ORGANIZED HEALTH CARE EDUCATION/TRAINING PROGRAM

## 2024-08-22 PROCEDURE — 80061 LIPID PANEL: CPT | Performed by: STUDENT IN AN ORGANIZED HEALTH CARE EDUCATION/TRAINING PROGRAM

## 2024-08-22 PROCEDURE — 99204 OFFICE O/P NEW MOD 45 MIN: CPT | Performed by: STUDENT IN AN ORGANIZED HEALTH CARE EDUCATION/TRAINING PROGRAM

## 2024-08-22 PROCEDURE — 84443 ASSAY THYROID STIM HORMONE: CPT | Performed by: STUDENT IN AN ORGANIZED HEALTH CARE EDUCATION/TRAINING PROGRAM

## 2024-08-22 PROCEDURE — 80053 COMPREHEN METABOLIC PANEL: CPT | Performed by: STUDENT IN AN ORGANIZED HEALTH CARE EDUCATION/TRAINING PROGRAM

## 2024-08-22 PROCEDURE — 82607 VITAMIN B-12: CPT | Performed by: STUDENT IN AN ORGANIZED HEALTH CARE EDUCATION/TRAINING PROGRAM

## 2024-08-22 PROCEDURE — 36415 COLL VENOUS BLD VENIPUNCTURE: CPT | Performed by: STUDENT IN AN ORGANIZED HEALTH CARE EDUCATION/TRAINING PROGRAM

## 2024-08-22 RX ORDER — MIRTAZAPINE 7.5 MG/1
15 TABLET, FILM COATED ORAL AT BEDTIME
Qty: 180 TABLET | Refills: 1 | Status: SHIPPED | OUTPATIENT
Start: 2024-08-22

## 2024-08-22 RX ORDER — TRAZODONE HYDROCHLORIDE 100 MG/1
100 TABLET ORAL AT BEDTIME
COMMUNITY
Start: 2024-02-07 | End: 2024-08-22

## 2024-08-22 RX ORDER — CYANOCOBALAMIN (VITAMIN B-12) 500 MCG
400 LOZENGE ORAL DAILY
COMMUNITY

## 2024-08-22 RX ORDER — ALENDRONATE SODIUM 70 MG/1
70 TABLET ORAL
Qty: 12 TABLET | Refills: 3 | Status: SHIPPED | OUTPATIENT
Start: 2024-08-22

## 2024-08-22 RX ORDER — METOCLOPRAMIDE 5 MG/1
2.5 TABLET ORAL 3 TIMES DAILY PRN
Qty: 90 TABLET | Refills: 5 | Status: SHIPPED | OUTPATIENT
Start: 2024-08-22

## 2024-08-22 RX ORDER — FOLIC ACID 1 MG/1
1 TABLET ORAL
COMMUNITY
Start: 2023-12-28

## 2024-08-22 RX ORDER — PREGABALIN 50 MG/1
50 CAPSULE ORAL 2 TIMES DAILY
Qty: 60 CAPSULE | Refills: 5 | Status: SHIPPED | OUTPATIENT
Start: 2024-08-22

## 2024-08-22 RX ORDER — METOPROLOL TARTRATE 25 MG/1
12.5 TABLET, FILM COATED ORAL 2 TIMES DAILY
Qty: 90 TABLET | Refills: 1 | Status: SHIPPED | OUTPATIENT
Start: 2024-08-22

## 2024-08-22 RX ORDER — PANTOPRAZOLE SODIUM 40 MG/1
40 TABLET, DELAYED RELEASE ORAL 2 TIMES DAILY
COMMUNITY
Start: 2024-02-07 | End: 2024-08-22

## 2024-08-22 RX ORDER — MECLIZINE HCL 12.5 MG 12.5 MG/1
12.5 TABLET ORAL 3 TIMES DAILY PRN
Qty: 60 TABLET | Refills: 1 | Status: SHIPPED | OUTPATIENT
Start: 2024-08-22

## 2024-08-22 RX ORDER — ATORVASTATIN CALCIUM 40 MG/1
40 TABLET, FILM COATED ORAL
Qty: 90 TABLET | Refills: 3 | Status: SHIPPED | OUTPATIENT
Start: 2024-08-22

## 2024-08-22 RX ORDER — METHION/INOS/CHOL BT/B COM/LIV 110MG-86MG
1 CAPSULE ORAL
COMMUNITY
Start: 2023-09-01

## 2024-08-22 RX ORDER — METOPROLOL TARTRATE 25 MG/1
0.5 TABLET, FILM COATED ORAL
COMMUNITY
Start: 2024-05-13 | End: 2024-08-22

## 2024-08-22 RX ORDER — MULTIVITAMIN WITH IRON
1 TABLET ORAL DAILY
COMMUNITY

## 2024-08-22 RX ORDER — FERROUS SULFATE 325(65) MG
1 TABLET ORAL
COMMUNITY
Start: 2023-12-12

## 2024-08-22 RX ORDER — FAMOTIDINE 20 MG/1
20 TABLET, FILM COATED ORAL 2 TIMES DAILY PRN
COMMUNITY
Start: 2024-02-07

## 2024-08-22 RX ORDER — TRAZODONE HYDROCHLORIDE 100 MG/1
100 TABLET ORAL AT BEDTIME
Qty: 90 TABLET | Refills: 1 | Status: SHIPPED | OUTPATIENT
Start: 2024-08-22

## 2024-08-22 RX ORDER — UREA 10 %
500 LOTION (ML) TOPICAL DAILY
COMMUNITY
End: 2024-08-22

## 2024-08-22 RX ORDER — POLYETHYLENE GLYCOL 3350 17 G/17G
1 POWDER, FOR SOLUTION ORAL DAILY PRN
Qty: 510 G | Refills: 1 | Status: SHIPPED | OUTPATIENT
Start: 2024-08-22

## 2024-08-22 RX ORDER — UREA 10 %
500 LOTION (ML) TOPICAL DAILY
COMMUNITY
Start: 2024-08-22

## 2024-08-22 RX ORDER — CETIRIZINE HYDROCHLORIDE 10 MG/1
1 TABLET ORAL
COMMUNITY
Start: 2024-05-13

## 2024-08-22 RX ORDER — DICYCLOMINE HYDROCHLORIDE 10 MG/1
10 CAPSULE ORAL 3 TIMES DAILY
Qty: 90 CAPSULE | Refills: 5 | Status: SHIPPED | OUTPATIENT
Start: 2024-08-22

## 2024-08-22 RX ORDER — DICYCLOMINE HYDROCHLORIDE 10 MG/1
10 CAPSULE ORAL 3 TIMES DAILY
COMMUNITY
Start: 2024-02-08 | End: 2024-08-22

## 2024-08-22 RX ORDER — PANCRELIPASE 36000; 180000; 114000 [USP'U]/1; [USP'U]/1; [USP'U]/1
2 CAPSULE, DELAYED RELEASE PELLETS ORAL
COMMUNITY
Start: 2024-02-09 | End: 2024-08-22

## 2024-08-22 RX ORDER — METOCLOPRAMIDE 5 MG/1
2.5 TABLET ORAL 3 TIMES DAILY PRN
COMMUNITY
Start: 2023-12-28 | End: 2024-08-22

## 2024-08-22 RX ORDER — MECLIZINE HCL 12.5 MG 12.5 MG/1
1 TABLET ORAL
COMMUNITY
Start: 2024-05-13 | End: 2024-08-22

## 2024-08-22 RX ORDER — PANTOPRAZOLE SODIUM 40 MG/1
40 TABLET, DELAYED RELEASE ORAL 2 TIMES DAILY
Qty: 60 TABLET | Refills: 5 | Status: SHIPPED | OUTPATIENT
Start: 2024-08-22

## 2024-08-22 RX ORDER — PANCRELIPASE 36000; 180000; 114000 [USP'U]/1; [USP'U]/1; [USP'U]/1
2 CAPSULE, DELAYED RELEASE PELLETS ORAL
Qty: 180 CAPSULE | Refills: 5 | Status: SHIPPED | OUTPATIENT
Start: 2024-08-22 | End: 2024-09-04

## 2024-08-22 RX ORDER — ALENDRONATE SODIUM 70 MG/1
70 TABLET ORAL
COMMUNITY
Start: 2024-03-01 | End: 2024-08-22

## 2024-08-22 RX ORDER — MIRTAZAPINE 7.5 MG/1
15 TABLET, FILM COATED ORAL AT BEDTIME
COMMUNITY
Start: 2024-03-01 | End: 2024-08-22

## 2024-08-22 RX ORDER — ATORVASTATIN CALCIUM 40 MG/1
1 TABLET, FILM COATED ORAL
COMMUNITY
Start: 2024-05-13 | End: 2024-08-22

## 2024-08-22 RX ORDER — ASPIRIN 81 MG/1
81 TABLET ORAL DAILY
COMMUNITY

## 2024-08-22 NOTE — PROGRESS NOTES
Assessment & Plan     Encounter to establish care  Recently moved from [place]. CHETAN's signed today    IPF (idiopathic pulmonary fibrosis) (H)  Needs referral to local pulmonologist. Previously didn't tolerate treatment for IPF. Does endorses limitations in activity from breathing.   - Adult Pulmonary Medicine  Referral; Future  - TSH with free T4 reflex; Future  - TSH with free T4 reflex    Hyperlipidemia LDL goal <130  Recheck labs and refilled prescriptions   - atorvastatin (LIPITOR) 40 MG tablet; Take 1 tablet (40 mg) by mouth daily at 2 pm.  - Lipid panel reflex to direct LDL Fasting; Future  - Lipid panel reflex to direct LDL Fasting    Benign essential hypertension  Chronic, stable. BP adequately controlled today; no adjustments to meds, refills sent  - metoprolol tartrate (LOPRESSOR) 25 MG tablet; Take 0.5 tablets (12.5 mg) by mouth 2 times daily.  - Comprehensive metabolic panel (BMP + Alb, Alk Phos, ALT, AST, Total. Bili, TP); Future  - TSH with free T4 reflex; Future  - Comprehensive metabolic panel (BMP + Alb, Alk Phos, ALT, AST, Total. Bili, TP)  - TSH with free T4 reflex    Osteoporosis without current pathological fracture, unspecified osteoporosis type  Unsure when last dexa was. We will obtain records and determine how long she's been on fosamax.   - alendronate (FOSAMAX) 70 MG tablet; Take 1 tablet (70 mg) by mouth every 7 days.  - TSH with free T4 reflex; Future  - TSH with free T4 reflex    Hiatal hernia  Gastroesophageal reflux disease with esophagitis without hemorrhage  On protonix 40mg BID with PRN pepcid. Has ran out of medications so symptoms have been worse recently. Recommend restarting protonix and use her PRN pepcid BID x 2 weeks and monitor for improvement in her lower chest discomfort. If not improving recommend stress test.   - pantoprazole (PROTONIX) 40 MG EC tablet; Take 1 tablet (40 mg) by mouth 2 times daily.  - Adult GI  Referral - Consult Only; Future  -  Comprehensive metabolic panel (BMP + Alb, Alk Phos, ALT, AST, Total. Bili, TP); Future  - Comprehensive metabolic panel (BMP + Alb, Alk Phos, ALT, AST, Total. Bili, TP)    Dizziness  Benign paroxysmal positional vertigo, unspecified laterality  Chronic issue and takes meclizine for this as needed. Has discussed vestibular PT previously but hasn't done this. Referral sent. We will also check labs for other causes of vertigo   - meclizine (ANTIVERT) 12.5 MG tablet; Take 1 tablet (12.5 mg) by mouth 3 times daily as needed for dizziness.  - CBC with platelets and differential; Future  - TSH with free T4 reflex; Future  - Physical Therapy  Referral; Future  - CBC with platelets and differential  - TSH with free T4 reflex    Meningioma (H)  Now s/p resection. Has had issues with aphasia and ambulation s/p resection. Follows annually with neurosurgeon in [place], CHETAN signed and referred locally. On lyrica   - pregabalin (LYRICA) 50 MG capsule; Take 1 capsule (50 mg) by mouth 2 times daily.  - Adult Neurosurgery  Referral; Future    Insomnia, unspecified type  Improved with addition of remeron to trazodone.   - mirtazapine (REMERON) 7.5 MG tablet; Take 2 tablets (15 mg) by mouth at bedtime.  - traZODone (DESYREL) 100 MG tablet; Take 1 tablet (100 mg) by mouth at bedtime.    Irritable bowel syndrome with constipation  Gastroparesis  Had been managed by GI in [place]. Unclear  but sounds like she has IBS-C, gastroparesis and possible exocrine pancreatic insufficiency based on medications. Refills on medications sent. Referred locally and obtained CHETAN  - dicyclomine (BENTYL) 10 MG capsule; Take 1 capsule (10 mg) by mouth 3 times daily.  - polyethylene glycol (MIRALAX) 17 GM/Dose powder; Take 17 g (1 Capful) by mouth daily as needed for constipation.  - CREON [id number] units CPEP per EC capsule; Take 2 capsules by mouth 3 times daily (with meals).  - Adult GI  Referral - Consult Only; Future  -  metoclopramide (REGLAN) 5 MG tablet; Take 0.5 tablets (2.5 mg) by mouth 3 times daily as needed (before meals for nausea).    Vitamin B12 deficiency (non anemic)  - cyanocobalamin (VITAMIN B-12) 500 MCG tablet; Take 1 tablet (500 mcg) by mouth daily.  - Vitamin B12; Future  - Vitamin B12        Screening for thyroid disorder  - TSH with free T4 reflex; Future  - TSH with free T4 reflex                Subjective   Vandana is a 79 year old, presenting for the following health issues:  Establish Care        8/22/2024     8:14 AM   Additional Questions   Accompanied by daughter         8/22/2024     8:14 AM   Patient Reported Additional Medications   Patient reports taking the following new medications yes     History of Present Illness       Reason for visit:  Establish primary doctor and address symptoms She is missing 7 dose(s) of medications per week.     Few CHETAN were given to fill out  Moved here from [place] recently in April 2024  Daughter, Marcelina with her today.   Ran out of meds two months ago.       Living in senior housing. Independent living. Has options for food there as well.   Has small appetite.   Lost about 60 lbs when they discovered her brain tumor. Weight has been stable since then.   Daughter takes her grocery shopping.   No falls. Uses a 4 wheel walker with a seat all the time when going out of apartment; not using anything in the home.     Cough productive with mucous. Also with gerd.   Strained lower chest with coughing. Some lower chest pain with radiation up in chest when walking.   Hasn't used wedge for bed. Wakes up with bile in throat at night.     Medical history-   pulmonary fibrosis - didn't tolerate medications before.   Osteoporosis  Lipids  Hital hernia   Acid reflux - uses protonix 40 mg BID with PRN pepcid or brenda seltzer.   Vertigo   Brain tumor removed 2020 - has had to learn to walk and talk again.   Aphasia due to brain tumor.   Hypertension    Medication list-  Fosamax ran out a month  "ago. Last Dexa?  Asa 81mg  Atorvastatin 40 mg  Vitamin d3  B12  Dicyclomine 10 mg stomach spams  Pepcid 20  [id number]  Folic acid  Pregabalin 50 mg daily   Magnesium   Meclizine   Metoprolol 25 mg   Protonix  Miralax  Trazadone  Mirtazapine  Thiamin 100 mg  Cetrizine daily for mucus   Vitamin E                Review of Systems  Constitutional, HEENT, cardiovascular, pulmonary, gi and gu systems are negative, except as otherwise noted.      Objective    /78 (BP Location: Right arm, Cuff Size: Adult Regular)   Pulse 91   Temp 97.7  F (36.5  C) (Oral)   Resp 16   Ht 1.56 m (5' 1.42\")   Wt 43.6 kg (96 lb 3.2 oz)   SpO2 90%   BMI 17.93 kg/m    Body mass index is 17.93 kg/m .  Physical Exam   GENERAL: alert and no distress  NECK: no adenopathy, no asymmetry, masses, or scars  RESP: lungs clear to auscultation - no rales, rhonchi or wheezes  CV: regular rate and rhythm, normal S1 S2, no S3 or S4, no murmur, click or rub, no peripheral edema  ABDOMEN: soft, nontender, no hepatosplenomegaly, no masses and bowel sounds normal  MS: no gross musculoskeletal defects noted, no edema            Signed Electronically by: Ashwini Goode,     "

## 2024-08-26 ENCOUNTER — TELEPHONE (OUTPATIENT)
Dept: FAMILY MEDICINE | Facility: CLINIC | Age: 79
End: 2024-08-26
Payer: MEDICARE

## 2024-08-26 DIAGNOSIS — K31.84 GASTROPARESIS: ICD-10-CM

## 2024-08-26 DIAGNOSIS — K58.1 IRRITABLE BOWEL SYNDROME WITH CONSTIPATION: ICD-10-CM

## 2024-08-26 DIAGNOSIS — K86.81 EXOCRINE PANCREATIC INSUFFICIENCY: Primary | ICD-10-CM

## 2024-08-27 NOTE — TELEPHONE ENCOUNTER
Central Prior Authorization Team - Phone: [phone]     PA Initiation    Medication: CREON [id number] UNITS PO CPEP  Insurance Company: [employer] - Phone [phone] Fax [phone]  Pharmacy Filling the Rx: [id number] [place], [place] - [address]  Filling Pharmacy Phone: [phone]  Filling Pharmacy Fax:    Start Date: 8/27/2024

## 2024-08-29 ENCOUNTER — TELEPHONE (OUTPATIENT)
Dept: GASTROENTEROLOGY | Facility: CLINIC | Age: 79
End: 2024-08-29
Payer: MEDICARE

## 2024-08-29 NOTE — TELEPHONE ENCOUNTER
[facility] Call Center    Phone Message    May a detailed message be left on voicemail: Yes    Reason for Call: Other: Patient is currently scheduled on 10/02/2024, as visit type New GI Urgent. This is outside the expected timeline for this referral. Patient has been added to the waitlist.      Action Taken: Message routed to:  Other: GI REFERRAL TRIAGE POOL     Travel Screening: Not Applicable

## 2024-08-29 NOTE — TELEPHONE ENCOUNTER
Central Prior Authorization Team - Phone: [phone]     PRIOR AUTHORIZATION DENIED    Medication: CREON [id number] UNITS PO CPEP  Insurance Company: [employer] - Phone [phone] Fax [phone]  Denial Date: 8/28/2024    Denial Reason(s):       Appeal Information:     Patient Notified: NO  Unfortunately, we cannot call the patient with denials because we do not know what next steps the MD will take nor can we give medical advice, please notify the patient of what they are to expect for the continuation of their therapy from the provider.

## 2024-08-29 NOTE — TELEPHONE ENCOUNTER
REFERRAL INFORMATION:  Referring Provider:  Ashwini Goode DO  Referring Clinic:  [facility]  Reason for Visit/Diagnosis:   K21.00 (ICD-10-CM) - Gastroesophageal reflux disease with esophagitis without hemorrhage   K58.1 (ICD-10-CM) - Irritable bowel syndrome with constipation   K31.84 (ICD-10-CM) - Gastroparesis, gastroparesis, IBS? was on creon, unsure if EPI (awaiting records from [place])         FUTURE VISIT INFORMATION:  Appointment Date: 10/2/24  Appointment Time:      NOTES STATUS DETAILS   OFFICE NOTE from Referring Provider Internal 8/22/24   OFFICE NOTE from Other Specialist Media 2/24/21-Dr. Stanford-Neuro-Oncology-[facility] DISCHARGE SUMMARY/  ED VISITS N/A    OPERATIVE REPORT N/A    MEDICATION LIST Internal         ENDOSCOPY  N/A    COLONOSCOPY N/A    ERCP N/A    EUS N/A    STOOL TESTING N/A    PERTINENT LABS N/A    PATHOLOGY REPORTS (RELATED) N/A    IMAGING (CT, MRI, EGD, MRCP, Small Bowel Follow Through/SBT, MR/CT Enterography) N/A

## 2024-09-04 RX ORDER — PANCRELIPASE 36000; 180000; 114000 [USP'U]/1; [USP'U]/1; [USP'U]/1
2 CAPSULE, DELAYED RELEASE PELLETS ORAL
Qty: 180 CAPSULE | Refills: 2 | Status: SHIPPED | OUTPATIENT
Start: 2024-09-04 | End: 2024-10-02

## 2024-09-11 ENCOUNTER — TELEPHONE (OUTPATIENT)
Dept: PULMONOLOGY | Facility: CLINIC | Age: 79
End: 2024-09-11
Payer: MEDICARE

## 2024-09-11 DIAGNOSIS — J84.9 ILD (INTERSTITIAL LUNG DISEASE) (H): Primary | ICD-10-CM

## 2024-09-11 NOTE — TELEPHONE ENCOUNTER
ILD New Patient Referral: Pre visit communication    Patient: Vandana Ivey  Reason for Referral: ILD  Referring Physician: Dr Goode   Referring Clinic/Contact: Phone#: PCP    Chest CT scan: 10/2023   Biopsy: None  PFT's:10/2023  Additional testing:     Current symptoms: SOB, weakness, cough  Current related prescriptions: none    Supplemental oxygen? No    In review of apparent records and conversation with patient; recommend first visit with ILD provider be conducted :  In person visit  Testing needed: CT scan and Full PFT's and walk    Additional notes:        CT with significant disease, daughter stated patient was in the 80's at her primary care visit. Instructed daughter to bring patient to ER if really worried about symptoms.

## 2024-09-24 NOTE — TELEPHONE ENCOUNTER
Pt's daughter Marcelina called as she has not yet heard back about scheduling an appointment with ILD. Please call marcelina back at [phone]. Per Marcelina, you can schedule her for any appointment and leave a voicemail, they will try to make it work.

## 2024-09-30 ENCOUNTER — OFFICE VISIT (OUTPATIENT)
Dept: INTERNAL MEDICINE | Facility: CLINIC | Age: 79
End: 2024-09-30
Payer: MEDICARE

## 2024-09-30 ENCOUNTER — MYC MEDICAL ADVICE (OUTPATIENT)
Dept: INTERNAL MEDICINE | Facility: CLINIC | Age: 79
End: 2024-09-30

## 2024-09-30 VITALS
HEART RATE: 76 BPM | DIASTOLIC BLOOD PRESSURE: 70 MMHG | SYSTOLIC BLOOD PRESSURE: 153 MMHG | HEIGHT: 61 IN | RESPIRATION RATE: 16 BRPM | WEIGHT: 96 LBS | BODY MASS INDEX: 18.12 KG/M2 | OXYGEN SATURATION: 90 % | TEMPERATURE: 98.2 F

## 2024-09-30 DIAGNOSIS — J84.10 PULMONARY FIBROSIS (H): Primary | ICD-10-CM

## 2024-09-30 DIAGNOSIS — J01.11 ACUTE RECURRENT FRONTAL SINUSITIS: ICD-10-CM

## 2024-09-30 DIAGNOSIS — R09.02 HYPOXIA: ICD-10-CM

## 2024-09-30 PROCEDURE — 99214 OFFICE O/P EST MOD 30 MIN: CPT

## 2024-09-30 NOTE — LETTER
Opioid / Opioid Plus Controlled Substance Agreement    This is an agreement between you and your provider about the safe and appropriate use of controlled substance/opioids prescribed by your care team. Controlled substances are medicines that can cause physical and mental dependence (abuse).    There are strict laws about having and using these medicines. We here at [facility] are committing to working with you in your efforts to get better. To support you in this work, we ll help you schedule regular office appointments for medicine refills. If we must cancel or change your appointment for any reason, we ll make sure you have enough medicine to last until your next appointment.     As a Provider, I will:  Listen carefully to your concerns and treat you with respect.   Recommend a treatment plan that I believe is in your best interest. This plan may involve therapies other than opioid pain medication.   Talk with you often about the possible benefits, and the risk of harm of any medicine that we prescribe for you.   Provide a plan on how to taper (discontinue or go off) using this medicine if the decision is made to stop its use.    As a Patient, I understand that opioid(s):   Are a controlled substance prescribed by my care team to help me function or work and manage my condition(s).   Are strong medicines and can cause serious side effects such as:  Drowsiness, which can seriously affect my driving ability  A lower breathing rate, enough to cause death  Harm to my thinking ability   Depression   Abuse of and addiction to this medicine  Need to be taken exactly as prescribed. Combining opioids with certain medicines or chemicals (such as illegal drugs, sedatives, sleeping pills, and benzodiazepines) can be dangerous or even fatal. If I stop opioids suddenly, I may have severe withdrawal symptoms.  Do not work for all types of pain nor for all patients. If they re not helpful, I may be asked to stop  them.    {Benzo / Stimulant (Optional):564989}    The risks, benefits and side effects of these medicine(s) were explained to me. I agree that:  I will take part in other treatments as advised by my care team. This may be psychiatry or counseling, physical therapy, behavioral therapy, group treatment or a referral to a specialist.     I will keep all my appointments. I understand that this is part of the monitoring of opioids. My care team may require an office visit for EVERY opioid/controlled substance refill. If I miss appointments or don t follow instructions, my care team may stop my medicine.    I will take my medicines as prescribed. I will not change the dose or schedule unless my care team tells me to. There will be no refills if I run out early.     I may be asked to come to the clinic and complete a urine drug test or complete a pill count at any time. If I don t give a urine sample or participate in a pill count, the care team may stop my medicine.    I will only receive prescriptions from this clinic for chronic pain. If I am treated by another provider for acute pain issues, I will tell them that I am taking opioid pain medication for chronic pain and that I have a treatment agreement with this provider. I will inform my [facility] care team within one business day if I am given a prescription for any pain medication by another healthcare provider. My [facility] care team can contact other providers and pharmacists about my use of any medicines.    It is up to me to make sure that I don t run out of my medicines on weekends or holidays. If my care team is willing to refill my opioid prescription without a visit, I must request refills only during office hours. Refills may take up to 3 business days to process. I will use one pharmacy to fill all my opioid and other controlled substance prescriptions. I will notify the clinic about any changes to my insurance or medication  availability.    I am responsible for my prescriptions. If the medicine/prescription is lost, stolen or destroyed, it will not be replaced. I also agree not to share controlled substance medicines with anyone.    I am aware I should not use any illegal or recreational drugs. I agree not to drink alcohol unless my care team says I can.       If I enroll in the [place] [facility] Cannabis program, I will tell my care team prior to my next refill.     I will tell my care team right away if I become pregnant, have a new medical problem treated outside of my regular clinic, or have a change in my medications.    I understand that this medicine can affect my thinking, judgment and reaction time. Alcohol and drugs affect the brain and body, which can affect the safety of my driving. Being under the influence of alcohol or drugs can affect my decision-making, behaviors, personal safety, and the safety of others. Driving while impaired (DWI) can occur if a person is driving, operating, or in physical control of a car, motorcycle, boat, snowmobile, ATV, motorbike, off-road vehicle, or any other motor vehicle ([place] Statute 169A.20). I understand the risk if I choose to drive or operate any vehicle or machinery.    I understand that if I do not follow any of the conditions above, my prescriptions or treatment may be stopped or changed.          Opioids  What You Need to Know    What are opioids?   Opioids are pain medicines that must be prescribed by a doctor. They are also known as narcotics.     Examples are:   morphine (MS Contin, Patti)  oxycodone (Oxycontin)  oxycodone and acetaminophen (Percocet)  hydrocodone and acetaminophen (Vicodin, Norco)   fentanyl patch (Duragesic)   hydromorphone (Dilaudid)   methadone  codeine (Tylenol #3)     What do opioids do well?   Opioids are best for severe short-term pain such as after a surgery or injury. They may work well for cancer pain. They may help some people with long-lasting  (chronic) pain.     What do opioids NOT do well?   Opioids never get rid of pain entirely, and they don t work well for most patients with chronic pain. Opioids don t reduce swelling, one of the causes of pain.                                    Other ways to manage chronic pain and improve function include:     Treat the health problem that may be causing pain  Anti-inflammation medicines, which reduce swelling and tenderness, such as ibuprofen (Advil, Motrin) or naproxen (Aleve)  Acetaminophen (Tylenol)  Antidepressants and anti-seizure medicines, especially for nerve pain  Topical treatments such as patches or creams  Injections or nerve blocks  Chiropractic or osteopathic treatment  Acupuncture, massage, deep breathing, meditation, visual imagery, aromatherapy  Use heat or ice at the pain site  Physical therapy   Exercise  Stop smoking  Take part in therapy       Risks and side effects     Talk to your doctor before you start or decide to keep taking opioids. Possible side effects include:    Lowering your breathing rate enough to cause death  Overdose, including death, especially if taking higher than prescribed doses  Worse depression symptoms; less pleasure in things you usually enjoy  Feeling tired or sluggish  Slower thoughts or cloudy thinking  Being more sensitive to pain over time; pain is harder to control  Trouble sleeping or restless sleep  Changes in hormone levels (for example, less testosterone)  Changes in sex drive or ability to have sex  Constipation  Unsafe driving  Itching and sweating  Dizziness  Nausea, throwing up and dry mouth    What else should I know about opioids?    Opioids may lead to dependence, tolerance, or addiction.    Dependence means that if you stop or reduce the medicine too quickly, you will have withdrawal symptoms. These include loose poop (diarrhea), jitters, flu-like symptoms, nervousness and tremors. Dependence is not the same as addiction.                      Tolerance means needing higher doses over time to get the same effect. This may increase the chance of serious side effects.    Addiction is when people improperly use a substance that harms their body, their mind or their relations with others. Use of opiates can cause a relapse of addiction if you have a history of drug or alcohol abuse.    People who have used opioids for a long time may have a lower quality of life, worse depression, higher levels of pain and more visits to doctors.    You can overdose on opioids. Take these steps to lower your risk of overdose:    Recognize the signs:  Signs of overdose include decrease or loss of consciousness (blackout), slowed breathing, trouble waking up and blue lips. If someone is worried about overdose, they should call 911.    Talk to your doctor about Narcan (naloxone).   If you are at risk for overdose, you may be given a prescription for Narcan. This medicine very quickly reverses the effects of opioids.   If you overdose, a friend or family member can give you Narcan while waiting for the ambulance. They need to know the signs of overdose and how to give Narcan.     Don't use alcohol or street drugs.   Taking them with opioids can cause death.    Do not take any of these medicines unless your doctor says it s OK. Taking these with opioids can cause death:  Benzodiazepines, such as lorazepam (Ativan), alprazolam (Xanax) or diazepam (Valium)  Muscle relaxers, such as cyclobenzaprine (Flexeril)  Sleeping pills like zolpidem (Ambien)   Other opioids      How to keep you and other people safe while taking opioids:    Never share your opioids with others.  Opioid medicines are regulated by the Drug Enforcement Agency ([place]AN[place]). Selling or sharing medications is a criminal act.    2. Be sure to store opioids in a secure place, locked up if possible. Young children can easily swallow them and overdose.    3. When you are traveling with your medicines, keep them in the  original bottles. If you use a pill box, be sure you also carry a copy of your medicine list from your clinic or pharmacy.    4. Safe disposal of opioids    Most pharmacies have places to get rid of medicine, called disposal kiosks. Medicine disposal options are also available in every [facility]. Search your county and  medication disposal  to find more options. You can find more details at:  https://www.pca.[facility].[place]./living-green/managing-unwanted-medications     I agree that my provider, clinic care team, and pharmacy may work with any city, state or federal law enforcement agency that investigates the misuse, sale, or other diversion of my controlled medicine. I will allow my provider to discuss my care with, or share a copy of, this agreement with any other treating provider, pharmacy or emergency room where I receive care.    I have read this agreement and have asked questions about anything I did not understand.    _______________________________________________________  Patient Signature - Vandana Ivey _____________________                   Date     _______________________________________________________  Provider Signature - VITA Aden CNP   _____________________                   Date     _______________________________________________________  Witness Signature (required if provider not present while patient signing)   _____________________                   Date

## 2024-09-30 NOTE — PROGRESS NOTES
DME (Durable Medical Equipment) Orders and Documentation  Orders Placed This Encounter   Procedures    Oxygen Order        The patient was assessed and it was determined the patient is in need of the following listed DME Supplies/Equipment. Please complete supporting documentation below to demonstrate medical necessity.         Answers submitted by the patient for this visit:  General Questionnaire (Submitted on 9/30/2024)  Chief Complaint: Chronic problems general questions HPI Form  How many days per week do you miss taking your medication?: 0  General Concern (Submitted on 9/30/2024)  Chief Complaint: Chronic problems general questions HPI Form  What is the reason for your visit today?: possible sinus infection  When did your symptoms begin?: 3-4 weeks ago  What are your symptoms?: nasal drainage and coughing  How would you describe these symptoms?: Moderate  Are your symptoms:: Staying the same  Have you had these symptoms before?: Yes  Have you tried or received treatment for these symptoms before?: Yes  Did that treatment work? : Yes  Please describe the treatment you had:: antibiotics  Is there anything that makes you feel worse?: none  Is there anything that makes you feel better?: tylenol

## 2024-09-30 NOTE — PROGRESS NOTES
Assessment & Plan   (J84.10) Pulmonary fibrosis (H)  (primary encounter diagnosis)  Comment: Patient seen in clinic today for oxygen need. Did walk test and noted desaturation into low 80's. Wrote DME order.   Plan: REVIEW OF HEALTH MAINTENANCE PROTOCOL ORDERS,         Oxygen Order        Provided DME    (R09.02) Hypoxia  Comment: Noted desaturation with walk test into the low 80's.   Plan: Oxygen Order            (J01.11) Acute recurrent frontal sinusitis  Comment: Patient has been having ongoing sinus issues over the last 6 weeks discussed prescribing antibiotic. Patient acknowledged and agreed to plan of care.   Plan: amoxicillin-clavulanate (AUGMENTIN) 875-125 MG         tablet        Prescription sent to the pharmacy.  Oxygen Documentation  I certify that this patient, Vandana Ivey has been under my care (or a nurse practitioner or physican's assistant working with me). This is the face-to-face encounter for oxygen medical necessity.      At the time of this encounter, I have reviewed the qualifying testing and have determined that supplemental oxygen is reasonable and necessary and is expected to improve the patient's condition in a home setting.         Patient has continued oxygen desaturation due to Pulmonary Fibrosis J84.10.    If portability is ordered, is the patient mobile within the home? yes    Was this visit performed as a telehealth visit: No    Patient has been assessed for Home Oxygen needs.  Oxygen readings:   *   RA - at rest  Pulse oximetry SPO2 88 %  *   RA - during activity/with exercise SPO2 83 %  *   O2 at   2 liters/minute (at rest) ...SPO2 99  *   O2 at  2 liters/minute (during activity/with exercise) ...SPO2 97 %          Pedro Ross is a 79 year old, presenting for the following health issues:  Sinus Problem        8/22/2024     8:14 AM   Additional Questions   Accompanied by daughter     History of Present Illness       Reason for visit:  Possible sinus infection  Symptom  "onset:  3-4 weeks ago  Symptoms include:  Nasal drainage and coughing  Symptom intensity:  Moderate  Symptom progression:  Staying the same  Had these symptoms before:  Yes  Has tried/received treatment for these symptoms:  Yes  Previous treatment was successful:  Yes  Prior treatment description:  Antibiotics  What makes it worse:  None  What makes it better:  Tylenol   She is taking medications regularly.           Review of Systems  Constitutional, HEENT, cardiovascular, pulmonary, gi and gu systems are negative, except as otherwise noted.      Objective    BP (!) 153/70 (BP Location: Right arm, Patient Position: Sitting, Cuff Size: Adult Small)   Pulse 76   Temp 98.2  F (36.8  C) (Temporal)   Resp 16   Ht 1.549 m (5' 1\")   Wt 43.5 kg (96 lb)   SpO2 90%   BMI 18.14 kg/m    Body mass index is 18.14 kg/m .  Physical Exam   GENERAL: alert and no distress  NECK: no adenopathy, no asymmetry, masses, or scars  RESP: lungs clear to auscultation - no rales, rhonchi or wheezes  CV: regular rate and rhythm, normal S1 S2, no S3 or S4, no murmur, click or rub, no peripheral edema  ABDOMEN: soft, nontender, no hepatosplenomegaly, no masses and bowel sounds normal  MS: no gross musculoskeletal defects noted, no edema            Signed Electronically by: VITA Aden CNP    "

## 2024-10-01 ENCOUNTER — TELEPHONE (OUTPATIENT)
Dept: PULMONOLOGY | Facility: CLINIC | Age: 79
End: 2024-10-01
Payer: MEDICARE

## 2024-10-01 NOTE — TELEPHONE ENCOUNTER
Dr. Lai was reached out to, she was notified that patient is declining and my need to be seen more urgently    Call placed to patients daughter  LVM to return call to nurse   New triage needed    Myah Thompson RN

## 2024-10-01 NOTE — TELEPHONE ENCOUNTER
Patient scheduled for walk test with Stephanie on Thursday, October 3rd at 7:30 am.  Dayan Meraz,

## 2024-10-01 NOTE — TELEPHONE ENCOUNTER
Yes schedule with me. We will have to double book that morning. I am not charging them for this visit. It is just for quick walk test.     Thanks,

## 2024-10-02 ENCOUNTER — PRE VISIT (OUTPATIENT)
Dept: GASTROENTEROLOGY | Facility: CLINIC | Age: 79
End: 2024-10-02

## 2024-10-02 ENCOUNTER — OFFICE VISIT (OUTPATIENT)
Dept: GASTROENTEROLOGY | Facility: CLINIC | Age: 79
End: 2024-10-02
Attending: STUDENT IN AN ORGANIZED HEALTH CARE EDUCATION/TRAINING PROGRAM
Payer: MEDICARE

## 2024-10-02 VITALS
BODY MASS INDEX: 18.12 KG/M2 | HEART RATE: 90 BPM | OXYGEN SATURATION: 90 % | WEIGHT: 96 LBS | SYSTOLIC BLOOD PRESSURE: 123 MMHG | HEIGHT: 61 IN | DIASTOLIC BLOOD PRESSURE: 75 MMHG

## 2024-10-02 DIAGNOSIS — R10.12 LUQ ABDOMINAL PAIN: ICD-10-CM

## 2024-10-02 DIAGNOSIS — K31.84 GASTROPARESIS: ICD-10-CM

## 2024-10-02 DIAGNOSIS — K86.89 PANCREATIC INSUFFICIENCY: ICD-10-CM

## 2024-10-02 DIAGNOSIS — K21.00 GASTROESOPHAGEAL REFLUX DISEASE WITH ESOPHAGITIS WITHOUT HEMORRHAGE: ICD-10-CM

## 2024-10-02 DIAGNOSIS — K58.1 IRRITABLE BOWEL SYNDROME WITH CONSTIPATION: Primary | ICD-10-CM

## 2024-10-02 PROCEDURE — 99205 OFFICE O/P NEW HI 60 MIN: CPT | Performed by: STUDENT IN AN ORGANIZED HEALTH CARE EDUCATION/TRAINING PROGRAM

## 2024-10-02 PROCEDURE — 99417 PROLNG OP E/M EACH 15 MIN: CPT | Performed by: STUDENT IN AN ORGANIZED HEALTH CARE EDUCATION/TRAINING PROGRAM

## 2024-10-02 ASSESSMENT — PAIN SCALES - GENERAL: PAINLEVEL: NO PAIN (0)

## 2024-10-02 NOTE — PROGRESS NOTES
GI CLINIC VISIT    CC/REFERRING MD:  Ashwini Goode  REASON FOR CONSULTATION:   Vandana Ivey is a 79 year old female who I was asked to see in consultation at the request of Dr. Ashwini Goode for   Chief Complaint   Patient presents with    New Patient       ASSESSMENT/PLAN:    1. Irritable bowel syndrome with constipation  Long history of constipation managing with PRN 1/2 capful of miralax. She has bristol type 1 stools, and takes miralax when it has been 2 days without a BM. She has stool once every 2-3 days. If she takes miralax for 2 days in a row, she reports she will have diarrhea. I recommend adding Citrucel 1 tsp daily to help regulate stools. Can eventually work up on the dose if tolerating well.     - Adult GI  Referral - Consult Only    2. LUQ abdominal pain  She had an episode of LUQ abdominal pain that radiated into her left chest last night that let up around 1 am. She did recently start Augmentin for a sinus infection and has been constipated, which are potential causes. She is taking ASA daily in the form of brenda seltzer, and I recommended she next time try pepto bismol or tums. If that is not effective or if pain is radiating into the chest again, I recommended she go to the ER.     3. Gastroesophageal reflux disease with esophagitis without hemorrhage  Uncontrolled reflux at night post hiatal hernia repair in 4525-0517. She is taking protonix 40 mg BID. I recommend she add in famotidine 20 mg BID. She was taking omeprazole in addition, which I recommended she stop. She is using brenda seltzer daily which I recommend she try to cut down on. She is already using a wedge pillow which she will continue.     - Adult GI  Referral - Consult Only    4. Gastroparesis  This is a presumed previous diagnosis due to Reglan on her list. She takes 2.5 mg TID with meals. Will review records.     5. Pancreatic insufficiency   She is prescribed Creon 20591, 2 tablets per meal. She  states she has been on this for a few years now. Awaiting records but she reports no change in symptoms when she takes Creon. Insurance is not covering it anymore. I recommend since she has not had any change in symptoms, that she try a period of time off Creon. If she develops symptoms of EPI, we can repeat a fecal elastase and fecal fat on formed stools to reassess.       We reviewed the following plan:   - Keep up the good work with the wedge pillow   - Continue Protonix 40 mg twice daily   - Start taking famotidine 20 mg twice daily everyday   - No need for any omeprazole when taking protonix since they do the same thing   - Next time you have abdominal pain, try some pepto bismol to help with pain. Tums could also be used. Try not to take brenda seltzer daily due to the aspirin content. If the pain persists, especially if it is radiating up to the chest, I'd recommend going to the emergency room to be assessed   - Start Citrucel 1 teaspoon daily in water or gatorade   - Okay to continue the Reglan with meals  - We will have you stop the Creon for now since it is not making a difference   - 3 month follow up   - Future considerations: repeat fecal elastase, fecal fat (she would like to hold off for now), abd xray, CT abd/pelvis       RTC 3 months      Thank you for this consultation. It was a pleasure to participate in the care of this patient; please contact us with any further questions.  A total of 55 face-to-face minutes was spent with this patient, >50% of which was counseling regarding the above delineated issues. An additional 26 minutes was spent on the date of the encounter doing chart review, documentation, and further activities as noted above.    Ciara Abad PA-C  Division of Gastroenterology, Hepatology and Nutrition  [facility]    ---------------------------------------------------------------------------------------------------  HPI:  Vandana Ivey is a 79 year old female with past  "medical history significant for idiopathic pulmonary fibrosis, hypertension, osteoporosis, hiatal hernia, GERD, B12 deficiency who presents for GERD, IBS with constipation, possible gastroparesis.     [facility] with [facility] in [place], [place].     She presents today with her daughter Marcelina. She has a history of a large hiatal hernia repair in 0986-4857. She does feel reflux symptoms significantly improved after this repair. Around this time, she reports having lost 60 lbs in 3 months. Shortly after that she was diagnosed with a brain tumor and had this removed in 2020. She reports dealing with trouble with word finding since this surgery. Shortly after that, she was diagnosed with idiopathic pulmonary fibrosis. She has moved to [place] to be closer to her daughter who has been caring for her. She lives in an independent living facility.     She reports she continues to have a lot of stomach acid and acid reflux at night when sleeping which comes and goes. She reports feeling \"bile\" come all the way up to her front teeth in the night. She reports it hurts \"all the way up\" the central chest when this happens. She continues on protonix 40 mg twice daily and is prescribed famotidine PRN but is not sure that she is using this. She requested that her daughter  some omeprazole OTC to use for breakthrough heartburn. She also uses tylenol and brenda seltzer when she has pyrosis and chest discomfort in the night. Lately she has been taking daily brenda seltzer. She also takes a baby aspirin daily. She reports some nausea, no vomiting.     She reports constipation with abdominal pain. She describes bristol type 1 stools. Stools are soft and 1-2 times per day every 2-3 days. She will take 1/2 capful of miralax if it has been 2 days without a BM. She then will sometimes develop watery stools if she takes miralax 1/2 capful for more than 1 day in a row. Then will have bristol type 6-7 stools. She has " "not been taking any fiber supplementation. She denies abdominal bloating or distension. Denies blood in the stool.     She reports she eats good and a good variety, 3 meals per day. Yogurt and jello for snacks. At [facility] apt has meals in the evening and will eat half of it, saves other half for lunch next day. She is prescribed Creon with meals and reports she has been taking thsi for the past few years. She is not quite sure how EPI was diagnosed and I am awaiting records from [place]. She states she does not notice a difference off of Creon.        ROS:    A 10 point review of systems was performed and is negative except as noted in the HPI.     PREVIOUS ENDOSCOPY:  Awaiting records     ALLERGIES:     Allergies   Allergen Reactions    Diclofenac      GI intolerance    Erythromycin      GI intolerance       PHYSICAL EXAMINATION:  Vitals /75   Pulse 90   Ht 1.549 m (5' 1\")   Wt 43.5 kg (96 lb)   SpO2 90%   BMI 18.14 kg/m     Wt   Wt Readings from Last 2 Encounters:   10/02/24 43.5 kg (96 lb)   09/30/24 43.5 kg (96 lb)      Gen: Pt sitting up on exam table in NAD, interactive and cooperative on exam  Eyes: sclerae anicteric, no injection  Cardiac: RRR, nl S1, S2, no murmurs, rubs or gallops  Resp: Tachypnic, clear on anterior exam  GI: Normoactive BS, abd soft, flat, tenderness over the RUQ, epigastric and LUQ region, no organomegaly or masses palpated  Skin: Warm, dry, no jaundice, nails appear healthy  Neuro: alert, oriented, answers questions appropriately      "

## 2024-10-02 NOTE — LETTER
10/2/2024      Vandana Ivey  [address]  [place] [address]      Dear Colleague,    Thank you for referring your patient, Vandana Ivey, to the [facility] [place]. Please see a copy of my visit note below.    GI CLINIC VISIT    CC/REFERRING MD:  Ashwini Goode  REASON FOR CONSULTATION:   Vandana Ivey is a 79 year old female who I was asked to see in consultation at the request of Dr. Ashwini Goode for   Chief Complaint   Patient presents with     New Patient       ASSESSMENT/PLAN:    1. Irritable bowel syndrome with constipation  Long history of constipation managing with PRN 1/2 capful of miralax. She has bristol type 1 stools, and takes miralax when it has been 2 days without a BM. She has stool once every 2-3 days. If she takes miralax for 2 days in a row, she reports she will have diarrhea. I recommend adding Citrucel 1 tsp daily to help regulate stools. Can eventually work up on the dose if tolerating well.     - Adult GI  Referral - Consult Only    2. LUQ abdominal pain  She had an episode of LUQ abdominal pain that radiated into her left chest last night that let up around 1 am. She did recently start Augmentin for a sinus infection and has been constipated, which are potential causes. She is taking ASA daily in the form of brenda seltzer, and I recommended she next time try pepto bismol or tums. If that is not effective or if pain is radiating into the chest again, I recommended she go to the ER.     3. Gastroesophageal reflux disease with esophagitis without hemorrhage  Uncontrolled reflux at night post hiatal hernia repair in 1799-2331. She is taking protonix 40 mg BID. I recommend she add in famotidine 20 mg BID. She was taking omeprazole in addition, which I recommended she stop. She is using brenda seltzer daily which I recommend she try to cut down on. She is already using a wedge pillow which she will continue.     - Adult  GI  Referral - Consult Only    4. Gastroparesis  This is a presumed previous diagnosis due to Reglan on her list. She takes 2.5 mg TID with meals. Will review records.     5. Pancreatic insufficiency   She is prescribed Creon 29660, 2 tablets per meal. She states she has been on this for a few years now. Awaiting records but she reports no change in symptoms when she takes Creon. Insurance is not covering it anymore. I recommend since she has not had any change in symptoms, that she try a period of time off Creon. If she develops symptoms of EPI, we can repeat a fecal elastase and fecal fat on formed stools to reassess.       We reviewed the following plan:   - Keep up the good work with the wedge pillow   - Continue Protonix 40 mg twice daily   - Start taking famotidine 20 mg twice daily everyday   - No need for any omeprazole when taking protonix since they do the same thing   - Next time you have abdominal pain, try some pepto bismol to help with pain. Tums could also be used. Try not to take brenda seltzer daily due to the aspirin content. If the pain persists, especially if it is radiating up to the chest, I'd recommend going to the emergency room to be assessed   - Start Citrucel 1 teaspoon daily in water or gatorade   - Okay to continue the Reglan with meals  - We will have you stop the Creon for now since it is not making a difference   - 3 month follow up   - Future considerations: repeat fecal elastase, fecal fat (she would like to hold off for now), abd xray, CT abd/pelvis       RTC 3 months      Thank you for this consultation. It was a pleasure to participate in the care of this patient; please contact us with any further questions.  A total of 55 face-to-face minutes was spent with this patient, >50% of which was counseling regarding the above delineated issues. An additional 26 minutes was spent on the date of the encounter doing chart review, documentation, and further activities as noted  "above.    Ciara Abad PA-C  Division of Gastroenterology, Hepatology and Nutrition  [facility]    ---------------------------------------------------------------------------------------------------  HPI:  Vandana Ivey is a 79 year old female with past medical history significant for idiopathic pulmonary fibrosis, hypertension, osteoporosis, hiatal hernia, GERD, B12 deficiency who presents for GERD, IBS with constipation, possible gastroparesis.     [facility] with [facility] in [place], [place].     She presents today with her daughter Marcelina. She has a history of a large hiatal hernia repair in 4789-3338. She does feel reflux symptoms significantly improved after this repair. Around this time, she reports having lost 60 lbs in 3 months. Shortly after that she was diagnosed with a brain tumor and had this removed in 2020. She reports dealing with trouble with word finding since this surgery. Shortly after that, she was diagnosed with idiopathic pulmonary fibrosis. She has moved to [place] to be closer to her daughter who has been caring for her. She lives in an independent living facility.     She reports she continues to have a lot of stomach acid and acid reflux at night when sleeping which comes and goes. She reports feeling \"bile\" come all the way up to her front teeth in the night. She reports it hurts \"all the way up\" the central chest when this happens. She continues on protonix 40 mg twice daily and is prescribed famotidine PRN but is not sure that she is using this. She requested that her daughter  some omeprazole OTC to use for breakthrough heartburn. She also uses tylenol and brenda seltzer when she has pyrosis and chest discomfort in the night. Lately she has been taking daily brenda seltzer. She also takes a baby aspirin daily. She reports some nausea, no vomiting.     She reports constipation with abdominal pain. She describes bristol type 1 stools. Stools are soft " "and 1-2 times per day every 2-3 days. She will take 1/2 capful of miralax if it has been 2 days without a BM. She then will sometimes develop watery stools if she takes miralax 1/2 capful for more than 1 day in a row. Then will have bristol type 6-7 stools. She has not been taking any fiber supplementation. She denies abdominal bloating or distension. Denies blood in the stool.     She reports she eats good and a good variety, 3 meals per day. Yogurt and jello for snacks. At residential apt has meals in the evening and will eat half of it, saves other half for lunch next day. She is prescribed Creon with meals and reports she has been taking thsi for the past few years. She is not quite sure how EPI was diagnosed and I am awaiting records from [place]. She states she does not notice a difference off of Creon.        ROS:    A 10 point review of systems was performed and is negative except as noted in the HPI.     PREVIOUS ENDOSCOPY:  Awaiting records     ALLERGIES:     Allergies   Allergen Reactions     Diclofenac      GI intolerance     Erythromycin      GI intolerance       PHYSICAL EXAMINATION:  Vitals /75   Pulse 90   Ht 1.549 m (5' 1\")   Wt 43.5 kg (96 lb)   SpO2 90%   BMI 18.14 kg/m     Wt   Wt Readings from Last 2 Encounters:   10/02/24 43.5 kg (96 lb)   09/30/24 43.5 kg (96 lb)      Gen: Pt sitting up on exam table in NAD, interactive and cooperative on exam  Eyes: sclerae anicteric, no injection  Cardiac: RRR, nl S1, S2, no murmurs, rubs or gallops  Resp: Tachypnic, clear on anterior exam  GI: Normoactive BS, abd soft, flat, tenderness over the RUQ, epigastric and LUQ region, no organomegaly or masses palpated  Skin: Warm, dry, no jaundice, nails appear healthy  Neuro: alert, oriented, answers questions appropriately        Again, thank you for allowing me to participate in the care of your patient.        Sincerely,        Ciara Abad PA-C  "

## 2024-10-02 NOTE — PATIENT INSTRUCTIONS
It was a pleasure meeting with you today and discussing your healthcare plan. Below is a summary of what we covered:    - Keep up the good work with the wedge pillow   - Continue Protonix 40 mg twice daily   - Start taking famotidine 20 mg twice daily everyday   - No need for any omeprazole when taking protonix since they do the same thing   - Next time you have abdominal pain, try some pepto bismol to help with pain. Tums could also be used. Try not to take brenda seltzer daily due to the aspirin content. If the pain persists, especially if it is radiating up to the chest, I'd recommend going to the emergency room to be assessed   - Start Citrucel 1 teaspoon daily in water or gatorade   - Okay to continue the Reglan with meals  - We will have you stop the Creon for now since it is not making a difference   - 3 month follow up   -  If you have any questions, please don't hesitate to contact me through our GI RN Clinic Coordinator, Talita Cooley, at [phone].      Please see below for any additional questions and scheduling guidelines.    Sign up for [employer]: [employer] patient portal serves as a secure platform for accessing your medical records from the [facility]. Additionally, [employer] facilitates easy, timely, and secure messaging with your care team. If you have not signed up, you may do so by using the provided code or calling [phone].    Coordinating your care after your visit:  There are multiple options for scheduling your follow-up care based on your provider's recommendation.    How do I schedule a follow-up clinic appointment:   After your appointment, you may receive scheduling assistance with the Clinic Coordinators by having a seat in the waiting room and a Clinic Coordinator will call you up to schedule.  Virtual visits or after you leave the clinic:  Your provider has placed a follow-up order in the [employer] portal for scheduling your return appointment. A member of the scheduling  team will contact you to schedule.  [employer] Scheduling: Timely scheduling through [employer] is advised to ensure appointment availability.   Call to schedule: You may schedule your follow-up appointment(s) by calling [phone], option 1.    How do I schedule my endoscopy or colonoscopy procedure:  If a procedure, such as a colonoscopy or upper endoscopy was ordered by your provider, the scheduling team will contact you to schedule this procedure. Or you may choose to call to schedule at   [phone], option 2.  Please allow 20-30 minutes when scheduling a procedure.    How do I get my blood work done? To get your blood work done, you need to schedule a lab appointment at an [facility]. There are multiple ways to schedule:   At the clinic: The Clinic Coordinator you meet after your visit can help you schedule a lab appointment.   [employer] scheduling: [employer] offers online lab scheduling at all [facility] laboratory locations.   Call to schedule: You can call [phone] to schedule your lab appointment.    How do I schedule my imaging study: To schedule imaging studies, such as CT scans, ultrasounds, MRIs, or X-rays, contact Imaging Services at [phone].    How do I schedule a referral to another doctor: If your provider recommended a referral to another specialist(s), the referral order was placed by your provider. You will receive a phone call to schedule this referral, or you may choose to call the number attached to the referral to self-schedule.    For Post-Visit Question(s):  For any inquiries following today's visit:  Please utilize [employer] messaging and allow 48 hours for reply or contact the Call Center during normal business hours at [phone], option 3.  For Emergent After-hours questions, contact the On-Call GI Fellow through the [facility] at [phone].  In addition, you may contact your Nurse directly using the provided contact  information.    Test Results:  Test results will be accessible via [employer] in compliance with the 21st Century Cures Act. This means that your results will be available to you at the same time as your provider. Often you may see your results before your provider does. Results are reviewed by staff within two weeks with communication follow-up. Results may be released in the patient portal prior to your care team review.    Prescription Refill(s):  Medication prescribed by your provider will be addressed during your visit. For future refills, please coordinate with your pharmacy. If you have not had a recent clinic visit or routine labs, for your safety, your provider may not be able to refill your prescription.

## 2024-10-02 NOTE — NURSING NOTE
"Chief Complaint   Patient presents with    New Patient       Vitals:    10/02/24 1444   BP: 123/75   Pulse: 90   SpO2: 90%   Weight: 43.5 kg (96 lb)   Height: 1.549 m (5' 1\")       Body mass index is 18.14 kg/m .    Kristal Rain MA    "

## 2024-10-03 ENCOUNTER — OFFICE VISIT (OUTPATIENT)
Dept: INTERNAL MEDICINE | Facility: CLINIC | Age: 79
End: 2024-10-03
Payer: MEDICARE

## 2024-10-03 VITALS
BODY MASS INDEX: 18.12 KG/M2 | DIASTOLIC BLOOD PRESSURE: 64 MMHG | WEIGHT: 96 LBS | HEIGHT: 61 IN | SYSTOLIC BLOOD PRESSURE: 120 MMHG | HEART RATE: 59 BPM | OXYGEN SATURATION: 97 % | RESPIRATION RATE: 18 BRPM | TEMPERATURE: 97.2 F

## 2024-10-03 DIAGNOSIS — R09.02 HYPOXIA: Primary | ICD-10-CM

## 2024-10-03 PROCEDURE — 99207 PR NO CHARGE LOS: CPT

## 2024-10-03 NOTE — PROGRESS NOTES
Assessment & Plan   (R09.02) Hypoxia  (primary encounter diagnosis)  Comment: Patient seen in clinic today for no charge walk test for oxygen need.   Plan: [place] home medical updated with information.       Oxygen Documentation  I certify that this patient, Vandana Ivey has been under my care (or a nurse practitioner or physican's assistant working with me). This is the face-to-face encounter for oxygen medical necessity.      At the time of this encounter, I have reviewed the qualifying testing and have determined that supplemental oxygen is reasonable and necessary and is expected to improve the patient's condition in a home setting.         Patient has continued oxygen desaturation due to Pulmonary Fibrosis J84.10.    If portability is ordered, is the patient mobile within the home? yes    Was this visit performed as a telehealth visit: No        Patient has been assessed for Home Oxygen needs.  Oxygen readings:   *   RA - at rest  Pulse oximetry SPO2 88 %  *   RA - during activity/with exercise SPO2 83 %  *   O2 at  2 liters/minute (at rest) ...SPO2 99 %  *   O2 at  2 liters/minute (during activity/with exercise) ...SPO2 97 %          Subjective   Vandana is a 79 year old, presenting for the following health issues:  No chief complaint on file.      10/3/2024     7:40 AM   Additional Questions   Roomed by Shelia   Accompanied by daughter     History of Present Illness       Reason for visit:  Possible sinus infection  Symptom onset:  3-4 weeks ago  Symptoms include:  Nasal drainage and coughing  Symptom intensity:  Moderate  Symptom progression:  Staying the same  Had these symptoms before:  Yes  Has tried/received treatment for these symptoms:  Yes  Previous treatment was successful:  Yes  Prior treatment description:  Antibiotics  What makes it worse:  None  What makes it better:  Tylenol   She is taking medications regularly.                 Review of Systems  Constitutional, HEENT, cardiovascular,  "pulmonary, gi and gu systems are negative, except as otherwise noted.      Objective    /64   Pulse 59   Temp 97.2  F (36.2  C) (Temporal)   Resp 18   Ht 1.549 m (5' 1\")   Wt 43.5 kg (96 lb)   SpO2 97%   BMI 18.14 kg/m    Body mass index is 18.14 kg/m .  Physical Exam     Not performed this visit        Signed Electronically by: VITA Aden CNP    "

## 2024-10-03 NOTE — TELEPHONE ENCOUNTER
Clinic has not received a return call from patient's daughter  Oc spoke with physicians on consults, they are unable to come for a clinic appointment as they already split across campuses   Patient is in the soonest available appointment    Myah Thompson RN

## 2024-10-06 ENCOUNTER — HEALTH MAINTENANCE LETTER (OUTPATIENT)
Age: 79
End: 2024-10-06

## 2024-10-14 ENCOUNTER — DOCUMENTATION ONLY (OUTPATIENT)
Dept: GASTROENTEROLOGY | Facility: CLINIC | Age: 79
End: 2024-10-14
Payer: MEDICARE

## 2024-10-14 NOTE — PROGRESS NOTES
Per Ciara Abad PA-C's request, KALYN requested records from [facility] in [place], [place]    Per provider,   From: Ciara Abad PA-C   Sent: 10/2/2024   4:10 PM CDT   To: [facility] Support Pool   Subject: Records request                                  Hello,   This patient is transferring care from [facility] in [place], [place]. They said they filled out a release of records form. Could we put in a request for records?     Thank you,   Ciara MCCAIN spoke with medical records representative. She stated that she will fax over  - Office notes  - Endoscopy repots  - Pathology reports  CMA provided with fax number.      Nika Sullivan CMA

## 2024-10-16 ENCOUNTER — TELEPHONE (OUTPATIENT)
Dept: GASTROENTEROLOGY | Facility: CLINIC | Age: 79
End: 2024-10-16
Payer: MEDICARE

## 2024-10-16 NOTE — TELEPHONE ENCOUNTER
Left Voicemail (1st Attempt) and Sent Mychart (1st Attempt) for the patient to call back and schedule the following:    Appointment type: Return GI  Provider: Ciara Abad   Return date: 3 mo (around Jan 2025)  Specialty phone number: [phone]  Additional appointment(s) needed: NA  Additonal Notes: LVM/MyC x1

## 2024-10-18 NOTE — TELEPHONE ENCOUNTER
Left Voicemail (2nd Attempt) and Sent Mychart (2nd Attempt) for the patient to call back and schedule the following:    Appointment type: Return GI  Provider: Ciara Abad   Return date: 3 mo (around Jan 2025)  Specialty phone number: [phone]  Additional appointment(s) needed: NA  Additonal Notes: LVMx2/MyC x2

## 2024-10-23 NOTE — CONFIDENTIAL NOTE
NEUROSURGERY - NEW PREVISIT PLANNING    Referring Provider: Ashwini Goode DO    OVN 8/22/24   Reason For Visit: D32.9 (ICD-10-CM) - Meningioma (H)        IMAGING STATUS/LOCATION DATE/TYPE   MRI PACS 09/20/2023  Brain  [facility]   CT N/A    XRAY N/A    NOTES STATUS/LOCATION DATE/TYPE   Other specialist OVN: [facility] / [place] 09/04/2024   EMG N/A    INJECTION N/A    PHYSICAL THERAPY N/A *has referral    SURGERY *sent fax request on 10/23 and 10/28 to [facility] 10/28/2020, craniotomy

## 2024-10-28 ENCOUNTER — TELEPHONE (OUTPATIENT)
Dept: NEUROSURGERY | Facility: CLINIC | Age: 79
End: 2024-10-28
Payer: MEDICARE

## 2024-10-28 NOTE — TELEPHONE ENCOUNTER
Left voice message to inform patient that Dr. Rainey would like patient to have an updated MRI prior to seeing him. Recommending to cancel upcoming appointment scheduled on 10/31, Thursday in [place] and patient can rebook after imaging has been completed. Per care team, patient will need to reach out to referring provider, Ashwini Goode, , and ask for a new brain MRI since the last one is from 09/20/2023. Writer provided scheduling number to call and cancel appointment.

## 2024-10-29 ENCOUNTER — MYC MEDICAL ADVICE (OUTPATIENT)
Dept: FAMILY MEDICINE | Facility: CLINIC | Age: 79
End: 2024-10-29
Payer: MEDICARE

## 2024-10-29 ENCOUNTER — TELEPHONE (OUTPATIENT)
Dept: NEUROSURGERY | Facility: CLINIC | Age: 79
End: 2024-10-29
Payer: MEDICARE

## 2024-10-29 DIAGNOSIS — D32.9 MENINGIOMA (H): Primary | ICD-10-CM

## 2024-10-29 NOTE — TELEPHONE ENCOUNTER
Writer explained to patient that the appointment on 10-31-24 with Dr Rainey was cancelled- patient will need an MRI prior to seeing the surgeon for a consult.  Patient was referred back to PCP for MRI referral. Patient was advised to call back once the MRI was scheduled.

## 2024-10-30 NOTE — TELEPHONE ENCOUNTER
Routing My Chart message to Dr. Goode    Neurosurgery requesting updated MRI    Neurosurgery message    Left voice message to inform patient that Dr. Rainey would like patient to have an updated MRI prior to seeing him. Recommending to cancel upcoming appointment scheduled on 10/31, Thursday in [place] and patient can rebook after imaging has been completed. Per care team, patient will need to reach out to referring provider, Ashwini Goode, , and ask for a new brain MRI since the last one is from 09/20/2023. Writer provided scheduling number to call and cancel appointment.          Pended    Michael Phan, RN, BSN, PHN  [facility]

## 2024-10-30 NOTE — TELEPHONE ENCOUNTER
Order placed for updated MRI. Please give pt instructions to schedule. Inquire if she needs anything to help with claustrophobia for procedure.     Thank you,     Ashwini Goode D.O.

## 2024-10-31 ENCOUNTER — PRE VISIT (OUTPATIENT)
Dept: NEUROSURGERY | Facility: CLINIC | Age: 79
End: 2024-10-31

## 2024-10-31 RX ORDER — LORAZEPAM 1 MG/1
.5-1 TABLET ORAL
Qty: 1 TABLET | Refills: 0 | Status: SHIPPED | OUTPATIENT
Start: 2024-10-31

## 2024-11-19 ENCOUNTER — ANCILLARY PROCEDURE (OUTPATIENT)
Dept: CT IMAGING | Facility: CLINIC | Age: 79
End: 2024-11-19
Attending: INTERNAL MEDICINE
Payer: MEDICARE

## 2024-11-19 ENCOUNTER — OFFICE VISIT (OUTPATIENT)
Dept: PULMONOLOGY | Facility: CLINIC | Age: 79
End: 2024-11-19
Attending: INTERNAL MEDICINE
Payer: MEDICARE

## 2024-11-19 VITALS
SYSTOLIC BLOOD PRESSURE: 132 MMHG | BODY MASS INDEX: 18.12 KG/M2 | HEIGHT: 61 IN | OXYGEN SATURATION: 99 % | DIASTOLIC BLOOD PRESSURE: 79 MMHG | WEIGHT: 96 LBS | HEART RATE: 82 BPM

## 2024-11-19 DIAGNOSIS — J84.9 ILD (INTERSTITIAL LUNG DISEASE) (H): Primary | ICD-10-CM

## 2024-11-19 DIAGNOSIS — J84.9 ILD (INTERSTITIAL LUNG DISEASE) (H): ICD-10-CM

## 2024-11-19 DIAGNOSIS — Z87.19 HISTORY OF REPAIR OF HIATAL HERNIA: ICD-10-CM

## 2024-11-19 DIAGNOSIS — Z98.890 HISTORY OF REPAIR OF HIATAL HERNIA: ICD-10-CM

## 2024-11-19 DIAGNOSIS — J84.10 PULMONARY FIBROSIS (H): Primary | ICD-10-CM

## 2024-11-19 DIAGNOSIS — J96.11 CHRONIC HYPOXEMIC RESPIRATORY FAILURE (H): ICD-10-CM

## 2024-11-19 DIAGNOSIS — K31.84 GASTROPARESIS: ICD-10-CM

## 2024-11-19 LAB
6 MIN WALK (FT): 210 FT
6 MIN WALK (M): 64 M

## 2024-11-19 PROCEDURE — 94618 PULMONARY STRESS TESTING: CPT | Performed by: INTERNAL MEDICINE

## 2024-11-19 PROCEDURE — 99207 PR NO CHARGE LOS: CPT

## 2024-11-19 PROCEDURE — 71250 CT THORAX DX C-: CPT | Mod: MG | Performed by: RADIOLOGY

## 2024-11-19 PROCEDURE — 99205 OFFICE O/P NEW HI 60 MIN: CPT | Mod: 25 | Performed by: INTERNAL MEDICINE

## 2024-11-19 PROCEDURE — 94375 RESPIRATORY FLOW VOLUME LOOP: CPT | Performed by: INTERNAL MEDICINE

## 2024-11-19 PROCEDURE — G1010 CDSM STANSON: HCPCS | Mod: GC | Performed by: RADIOLOGY

## 2024-11-19 PROCEDURE — G0463 HOSPITAL OUTPT CLINIC VISIT: HCPCS | Performed by: INTERNAL MEDICINE

## 2024-11-19 ASSESSMENT — PAIN SCALES - GENERAL: PAINLEVEL_OUTOF10: NO PAIN (0)

## 2024-11-19 NOTE — PROGRESS NOTES
[facility] Interstitial Lung Disease Program          Date of Visit: 11/19/24   Clinic Location: [facility], [address] [place] [address], [place] [address]      ASSESSMENT:  Interstitial lung disease-advanced pulmonary fibrosis, although asymmetric with left considerably worse than the right side. Symptom wise she endorses several year history of SOB, worse on exertion, as well as intermittent dry coughing. Following with GI for GERD and gastroparesis. Has h/o hiatal hernia repair. Sleeping exclusively on R side now because sleeping on L side hurts; has recently started using a wedge to prop herself up while sleeping. No sx suggestive of CTD. No significant exposure history. No history of smoking. PFT demonstrates 42% predicted  FVC, unable to obtain DLCO. 6MWT demonstrates extremely poor functional capacity. Detailed review of all CT imaging shows diffuse fibrosis throughout R lung with almost complete destruction of L lung, suggests a pattern of aspiration related fibrosis which has continued to progress. No family h/o ILD or rheumatologic disease. Differential diagnosis is likely aspiration related pulmonary fibrosis in the setting of long standing GERD, hiatal hernia and suspected gastroparesis.   Other medical conditions:  History of meningioma s/p resection  Eosinophilic esophagitis  Hiatal hernia repair  Gastroparesis  PLAN:  Diagnostic tests: ILD Panel.   Immunomodulatory therapy and monitoring: Pending ILD panel-no overt signs of CTD..  Antifibrotic therapy: Given severity of disease and likely worsening of GI symptoms with initiation of antifibrotics will hold off at this time pending additional evaluation. We will obtain records from [facility] where she reports she tried some meds and did not tolerate it.   Oxygen supplementation: 2L O2 supplementation at all times. However he likely needs more oxygen as she did not walk much at all today.   Pulmonary  hypertension: No records of echocardiogram. Hold off given no change in management.   Pulmonary rehabilitation: Referral placed for pulmonary rehab. Discussed the importance of pulmonary rehabilitation for advanced pulmonary disease-it's benefits on improving endurance, conditioning, and perception of dyspnea.   GERD therapy: Referral placed to GI for aspiration evaluation. We will get an opinion from Dr Rogers regarding any possible GI interventions although discussed with patient it is unlikely and possibly high risk. She has known history of eosinophilic esphagitis, GERD, hiatal hernia repair, chronic aspiration, chronic left chest pain and gastroparesis.   I also discussed the advanced nature of her pulmonary fibrosis and potential irreversibility. We briefly discussed goals of care and palliative resources were discussed.   RTC: 3 months with PFT.   I spent a total of  65  minutes reviewing chart (previous notes), reviewing test results, reviewing chest x rays and CT scans, talking with and examining patient, formulating plan, adjusting medications, and documentation of my findings and plan on the day of the encounter. Time excludes time spent for PFT.   Lj Peraza MD FCCP  Associate Professor of Medicine  Division of Pulmonary, Allergy & Critical Care   [facility]  [facility]      Chief Complaint:   Vandana Ivey is a 79 year old year old female who is being seen for New Patient (New ILD )    HPI    Vandana Ivey is a 79 year old  year old female who presents for evaluation and management of interstitial lung disease (ILD).  Pmhx significant for meningioma s/p resection in 2020, GERD, eosionphilic esophagiits, gastroparesis, s/p paraesophageal hernia repair in 2020.     Pt was found to have a L sided Meningioma which was surgically removed in 2020, never had chemotherapy or radiation. Had a prolonged recovery course with significant deconditioning, felt like  she never recovered after that. Has also had a prolonged history of GI concerns and reports that she was incidentally found to have pulmonary fibrosis on GI imaging in 2021. Followed with a pulmonologist in [place] and reports being started on a clinical trial 2-3 years ago which she did not tolerate due to severe GI side effects. Does not recall if/what medication they tried before the clinical trial. Reports that her sx, which include SOB at rest worse on exertion and dry cough, have been worsening over the last 2 years with significant worsening since the start of this year. Around April was getting SOB with minimal activity such as feeding herself. Daughter had her move here to offer more support. Saw PCP in October and was able to start on O2, currently using 2L at all times. Feels like this has improved her sx and she is now able to walk around in her apartment.  Notes that she has been having some night sweats over the past year. Denies f/c, productive cough, chest pain, joint swelling, morning stiffness, dry eyes, or weight loss.     ILD exposure questions:    Occupational exposure ( quarry, foundry, brake lining, insultation, paper mills etc)    Worked as a , no heavy dust exposure.     Smoking ( tobacco, marijuana, e-cigarettes, vaping, others): Denies.   Drugs (Amiodarone, Bleomycin, Nitrofuranoin, Radiation, Immunotherapy): Denies  Mold/mildew ( flood, musty basement): Denies  Birds/ bird droppings (pigeons, doves, parakeets, cockaties, chickens, ducks, geese): Denies  Feather pillow/blanket/down comforter/ jackets: Denies  Hot tub/jacuzzi/sauna: Denies  Humidifier: Denies  Hobbies- woodworking, brass or woodwind instruments, pottery, gardening: Some personal gardening many years ago.   Chemotherapy: Denies    ROS: 12 point ROS negative except mentioned in HPI.    Rheumatic ROS: dry eyes, dry mouth, raynauds, photosensitivity, joint stiffness/pain > 1 hour, blood in urine        No past  medical history on file.           No past surgical history on file.     Social History     Tobacco Use    Smoking status: Never     Passive exposure: Never    Smokeless tobacco: Never   Vaping Use    Vaping status: Never Used                  No family history on file.   Mother had a history of smoking and had COPD.     Any family history of ILD: Denies       Current Outpatient Medications   Medication Sig Dispense Refill    alendronate (FOSAMAX) 70 MG tablet Take 1 tablet (70 mg) by mouth every 7 days. 12 tablet 3    aspirin 81 MG EC tablet Take 81 mg by mouth daily.      atorvastatin (LIPITOR) 40 MG tablet Take 1 tablet (40 mg) by mouth daily at 2 pm. 90 tablet 3    cetirizine (ZYRTEC) 10 MG tablet Take 1 tablet by mouth daily at 2 pm.      cyanocobalamin (VITAMIN B-12) 500 MCG tablet Take 1 tablet (500 mcg) by mouth daily.      dicyclomine (BENTYL) 10 MG capsule Take 1 capsule (10 mg) by mouth 3 times daily. 90 capsule 5    famotidine (PEPCID) 20 MG tablet Take 20 mg by mouth 2 times daily as needed (gastric reflux or symptoms).      FEROSUL 325 (65 Fe) MG tablet Take 1 tablet by mouth daily at 2 pm.      folic acid (FOLVITE) 1 MG tablet Take 1 tablet by mouth daily at 2 pm.      LORazepam (ATIVAN) 1 MG tablet Take 0.5-1 tablets (0.5-1 mg) by mouth once as needed for anxiety (30-60 minutes prior to MRI, bring a ). 1 tablet 0    magnesium 250 MG tablet Take 1 tablet by mouth daily.      meclizine (ANTIVERT) 12.5 MG tablet Take 1 tablet (12.5 mg) by mouth 3 times daily as needed for dizziness. 60 tablet 1    metoclopramide (REGLAN) 5 MG tablet Take 0.5 tablets (2.5 mg) by mouth 3 times daily as needed (before meals for nausea). 90 tablet 5    metoprolol tartrate (LOPRESSOR) 25 MG tablet Take 0.5 tablets (12.5 mg) by mouth 2 times daily. 90 tablet 1    mirtazapine (REMERON) 7.5 MG tablet Take 2 tablets (15 mg) by mouth at bedtime. 180 tablet 1    pantoprazole (PROTONIX) 40 MG EC tablet Take 1 tablet (40 mg)  "by mouth 2 times daily. 60 tablet 5    polyethylene glycol (MIRALAX) 17 GM/Dose powder Take 17 g (1 Capful) by mouth daily as needed for constipation. 510 g 1    pregabalin (LYRICA) 50 MG capsule Take 1 capsule (50 mg) by mouth 2 times daily. 60 capsule 5    Thiamine HCl (B-1) 100 MG TABS Take 1 tablet by mouth daily at 2 pm.      traZODone (DESYREL) 100 MG tablet Take 1 tablet (100 mg) by mouth at bedtime. 90 tablet 1    vitamin E 400 units TABS Take 400 Units by mouth daily.       No current facility-administered medications for this visit.                        Allergies   Allergen Reactions    Diclofenac      GI intolerance    Erythromycin      GI intolerance                 VITAL SIGNS    /79 (BP Location: Right arm, Patient Position: Chair, Cuff Size: Adult Small)   Pulse 82   Ht 1.549 m (5' 1\")   Wt 43.5 kg (96 lb)   SpO2 99%   BMI 18.14 kg/m       Body mass index is 18.14 kg/m .        Physical Examination    General: Well developed, thin. No apparent distress  Eyes: Anicteric  Nose: Nasal mucosa with no edema or hyperemia.  No polyps  Ears: Hearing grossly normal  Mouth: Edentulous. Oral mucosa is moist, without any lesions. No oropharyngeal exudate.  Respiratory: 2L O2 via NC. R>L coarse crackles.   Cardiac: RRR, normal S1, S2. No murmurs. No JVD  Abdomen: Soft, NT/ND  Musculoskeletal: + clubbing. Extremities normal. No cyanosis. No edema.  Skin: No rash on limited exam  Neuro: Normal mentation. Normal speech, some word finding difficulty.  Psych:Normal affect         RESULTS:  CBC RESULTS:   Recent Labs   Lab Test 08/22/24  0947   WBC 12.9*   RBC 4.47   HGB 12.2   HCT 39.5   MCV 88   MCH 27.3   MCHC 30.9*   RDW 14.1         Serologies:  Pending  PFT interpretation: I personally reviewed and interpreted the PFTs: severe restriction.   FVC 42% of predicted  Unable to obtain DLCO  Six minute walk test:  November 19, 2024: 210 Ft, on baseline 2L O2.  Echocardiogram: n/a      Chest " imaging:    Recent Results (from the past 48 hours)   CT Chest Hi-Resolution wo Contrast    Narrative    EXAM: CT chest without intravenous contrast. 11/19/2024 9:14 AM    HISTORY: ILD (interstitial lung disease) (H)    TECHNIQUE: Helical acquisition of image data was performed for the  chest without intravenous contrast.    COMPARISON: Chest CT 10/2/2023, 1/6/2023.    FINDINGS:    Lines and tubes: None.    Thyroid: Subcentimeter hypodense left thyroid nodule.    Heart is upper normal size. There is left atrial enlargement. No  pericardial effusion.     Vessels: Thoracic aorta and main pulmonary artery are normal in  caliber.    Lymph nodes: No enlarged axillary or mediastinal lymph nodes by short  axis criteria.     Airways: Central airways are patent. Interval worsening of diffuse  left saccular bronchiectasis.    Lungs/Pleura: Continued destructive fibrosis throughout the left lung  parenchyma. Similar diffuse peripheral, linear consolidative and  reticular opacities throughout the right lung, predominantly in the  lower lobe. No pneumothorax. No pleural effusion. No significant air  trapping on expiration phase.    Upper abdomen: No acute findings in the visualized portions of the  abdomen. Tiny hypodense, circumscribed lesions in the liver, too small  to characterize however likely to represent hepatic cysts. Pancreatic  atrophy. Small hiatal hernia.     Soft Tissues: Diffuse muscular atrophy.    Bones: Degenerative changes of the spine. Chronic osteopenic  compression deformities of T12 and L1.          Impression    IMPRESSION:    1. Continued fibrosis with saccular bronchiectasis now composing  nearly the entire left hemithorax.  2. Similar diffuse fibrosis throughout the right lung, no new  consolidative opacities.  3. Other nonacute/chronic findings as described in the body of the  report.    I have personally reviewed the examination and initial interpretation  and I agree with the findings.    ARIEL HILL  MD KATHRYN         SYSTEM ID:  [id number]       Date:    I personally reviewed and interpreted the chest radiographs.    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 Due to the added complexity in care, I will continue to support this patient in the subsequent management of this condition(s) and with the ongoing continuity of care of this condition

## 2024-11-19 NOTE — LETTER
11/19/2024      Vandana Ivey  [address]  [place] [address]      Dear Colleague,    Thank you for referring your patient, aVndana Ivey, to the [facility] [place]. Please see a copy of my visit note below.      [facility] Disease [facility]          Date of Visit: 11/19/24   Clinic Location: [facility], [address] [place] [address], [place] [id number]      ASSESSMENT:  Interstitial lung disease-advanced pulmonary fibrosis, although asymmetric with left considerably worse than the right side. Symptom wise she endorses several year history of SOB, worse on exertion, as well as intermittent dry coughing. Following with GI for GERD and gastroparesis. Has h/o hiatal hernia repair. Sleeping exclusively on R side now because sleeping on L side hurts; has recently started using a wedge to prop herself up while sleeping. No sx suggestive of CTD. No significant exposure history. No history of smoking. PFT demonstrates 42% predicted  FVC, unable to obtain DLCO. 6MWT demonstrates extremely poor functional capacity. Detailed review of all CT imaging shows diffuse fibrosis throughout R lung with almost complete destruction of L lung, suggests a pattern of aspiration related fibrosis which has continued to progress. No family h/o ILD or rheumatologic disease. Differential diagnosis is likely aspiration related pulmonary fibrosis in the setting of long standing GERD, hiatal hernia and suspected gastroparesis.   Other medical conditions:  History of meningioma s/p resection  Eosinophilic esophagitis  Hiatal hernia repair  Gastroparesis  PLAN:  Diagnostic tests: ILD Panel.   Immunomodulatory therapy and monitoring: Pending ILD panel-no overt signs of CTD..  Antifibrotic therapy: Given severity of disease and likely worsening of GI symptoms with initiation of antifibrotics will hold off at this time pending  additional evaluation. We will obtain records from [facility] where she reports she tried some meds and did not tolerate it.   Oxygen supplementation: 2L O2 supplementation at all times. However he likely needs more oxygen as she did not walk much at all today.   Pulmonary hypertension: No records of echocardiogram. Hold off given no change in management.   Pulmonary rehabilitation: Referral placed for pulmonary rehab. Discussed the importance of pulmonary rehabilitation for advanced pulmonary disease-it's benefits on improving endurance, conditioning, and perception of dyspnea.   GERD therapy: Referral placed to GI for aspiration evaluation. We will get an opinion from Dr Rogers regarding any possible GI interventions although discussed with patient it is unlikely and possibly high risk. She has known history of eosinophilic esphagitis, GERD, hiatal hernia repair, chronic aspiration, chronic left chest pain and gastroparesis.   I also discussed the advanced nature of her pulmonary fibrosis and potential irreversibility. We briefly discussed goals of care and palliative resources were discussed.   RTC: 3 months with PFT.   I spent a total of  65  minutes reviewing chart (previous notes), reviewing test results, reviewing chest x rays and CT scans, talking with and examining patient, formulating plan, adjusting medications, and documentation of my findings and plan on the day of the encounter. Time excludes time spent for PFT.   Lj Peraza MD [facility]  Associate Professor of Medicine  Division of Pulmonary, Allergy & Critical Care   [facility]  [facility]      Chief Complaint:   Vandana Ivey is a 79 year old year old female who is being seen for New Patient (New ILD )    HPI    Vandana Ivey is a 79 year old  year old female who presents for evaluation and management of interstitial lung disease (ILD).  Pmhx significant for meningioma s/p resection in 2020,  GERD, eosionphilic esophagiits, gastroparesis, s/p paraesophageal hernia repair in 2020.     Pt was found to have a L sided Meningioma which was surgically removed in 2020, never had chemotherapy or radiation. Had a prolonged recovery course with significant deconditioning, felt like she never recovered after that. Has also had a prolonged history of GI concerns and reports that she was incidentally found to have pulmonary fibrosis on GI imaging in 2021. Followed with a pulmonologist in [place] and reports being started on a clinical trial 2-3 years ago which she did not tolerate due to severe GI side effects. Does not recall if/what medication they tried before the clinical trial. Reports that her sx, which include SOB at rest worse on exertion and dry cough, have been worsening over the last 2 years with significant worsening since the start of this year. Around April was getting SOB with minimal activity such as feeding herself. Daughter had her move here to offer more support. Saw PCP in October and was able to start on O2, currently using 2L at all times. Feels like this has improved her sx and she is now able to walk around in her apartment.  Notes that she has been having some night sweats over the past year. Denies f/c, productive cough, chest pain, joint swelling, morning stiffness, dry eyes, or weight loss.     ILD exposure questions:    Occupational exposure ( quarry, foundry, brake lining, insultation, paper mills etc)    Worked as a , no heavy dust exposure.     Smoking ( tobacco, marijuana, e-cigarettes, vaping, others): Denies.   Drugs (Amiodarone, Bleomycin, Nitrofuranoin, Radiation, Immunotherapy): Denies  Mold/mildew ( flood, musty basement): Denies  Birds/ bird droppings (pigeons, doves, parakeets, cockaties, chickens, ducks, geese): Denies  Feather pillow/blanket/down comforter/ jackets: Denies  Hot tub/jacuzzi/sauna: Denies  Humidifier: Denies  Hobbies- woodworking, brass or woodwind  instruments, pottery, gardening: Some personal gardening many years ago.   Chemotherapy: Denies    ROS: 12 point ROS negative except mentioned in HPI.    Rheumatic ROS: dry eyes, dry mouth, raynauds, photosensitivity, joint stiffness/pain > 1 hour, blood in urine        No past medical history on file.           No past surgical history on file.     Social History     Tobacco Use     Smoking status: Never     Passive exposure: Never     Smokeless tobacco: Never   Vaping Use     Vaping status: Never Used                  No family history on file.   Mother had a history of smoking and had COPD.     Any family history of ILD: Denies       Current Outpatient Medications   Medication Sig Dispense Refill     alendronate (FOSAMAX) 70 MG tablet Take 1 tablet (70 mg) by mouth every 7 days. 12 tablet 3     aspirin 81 MG EC tablet Take 81 mg by mouth daily.       atorvastatin (LIPITOR) 40 MG tablet Take 1 tablet (40 mg) by mouth daily at 2 pm. 90 tablet 3     cetirizine (ZYRTEC) 10 MG tablet Take 1 tablet by mouth daily at 2 pm.       cyanocobalamin (VITAMIN B-12) 500 MCG tablet Take 1 tablet (500 mcg) by mouth daily.       dicyclomine (BENTYL) 10 MG capsule Take 1 capsule (10 mg) by mouth 3 times daily. 90 capsule 5     famotidine (PEPCID) 20 MG tablet Take 20 mg by mouth 2 times daily as needed (gastric reflux or symptoms).       FEROSUL 325 (65 Fe) MG tablet Take 1 tablet by mouth daily at 2 pm.       folic acid (FOLVITE) 1 MG tablet Take 1 tablet by mouth daily at 2 pm.       LORazepam (ATIVAN) 1 MG tablet Take 0.5-1 tablets (0.5-1 mg) by mouth once as needed for anxiety (30-60 minutes prior to MRI, bring a ). 1 tablet 0     magnesium 250 MG tablet Take 1 tablet by mouth daily.       meclizine (ANTIVERT) 12.5 MG tablet Take 1 tablet (12.5 mg) by mouth 3 times daily as needed for dizziness. 60 tablet 1     metoclopramide (REGLAN) 5 MG tablet Take 0.5 tablets (2.5 mg) by mouth 3 times daily as needed (before meals for  "nausea). 90 tablet 5     metoprolol tartrate (LOPRESSOR) 25 MG tablet Take 0.5 tablets (12.5 mg) by mouth 2 times daily. 90 tablet 1     mirtazapine (REMERON) 7.5 MG tablet Take 2 tablets (15 mg) by mouth at bedtime. 180 tablet 1     pantoprazole (PROTONIX) 40 MG EC tablet Take 1 tablet (40 mg) by mouth 2 times daily. 60 tablet 5     polyethylene glycol (MIRALAX) 17 GM/Dose powder Take 17 g (1 Capful) by mouth daily as needed for constipation. 510 g 1     pregabalin (LYRICA) 50 MG capsule Take 1 capsule (50 mg) by mouth 2 times daily. 60 capsule 5     Thiamine HCl (B-1) 100 MG TABS Take 1 tablet by mouth daily at 2 pm.       traZODone (DESYREL) 100 MG tablet Take 1 tablet (100 mg) by mouth at bedtime. 90 tablet 1     vitamin E 400 units TABS Take 400 Units by mouth daily.       No current facility-administered medications for this visit.                        Allergies   Allergen Reactions     Diclofenac      GI intolerance     Erythromycin      GI intolerance                 VITAL SIGNS    /79 (BP Location: Right arm, Patient Position: Chair, Cuff Size: Adult Small)   Pulse 82   Ht 1.549 m (5' 1\")   Wt 43.5 kg (96 lb)   SpO2 99%   BMI 18.14 kg/m       Body mass index is 18.14 kg/m .        Physical Examination    General: Well developed, thin. No apparent distress  Eyes: Anicteric  Nose: Nasal mucosa with no edema or hyperemia.  No polyps  Ears: Hearing grossly normal  Mouth: Edentulous. Oral mucosa is moist, without any lesions. No oropharyngeal exudate.  Respiratory: 2L O2 via NC. R>L coarse crackles.   Cardiac: RRR, normal S1, S2. No murmurs. No JVD  Abdomen: Soft, NT/ND  Musculoskeletal: + clubbing. Extremities normal. No cyanosis. No edema.  Skin: No rash on limited exam  Neuro: Normal mentation. Normal speech, some word finding difficulty.  Psych:Normal affect         RESULTS:  CBC RESULTS:   Recent Labs   Lab Test 08/22/24  0947   WBC 12.9*   RBC 4.47   HGB 12.2   HCT 39.5   MCV 88   MCH 27.3 "   MCHC 30.9*   RDW 14.1         Serologies:  Pending  PFT interpretation: I personally reviewed and interpreted the PFTs: severe restriction.   FVC 42% of predicted  Unable to obtain DLCO  Six minute walk test:  November 19, 2024: 210 Ft, on baseline 2L O2.  Echocardiogram: n/a      Chest imaging:    Recent Results (from the past 48 hours)   CT Chest Hi-Resolution wo Contrast    Narrative    EXAM: CT chest without intravenous contrast. 11/19/2024 9:14 AM    HISTORY: ILD (interstitial lung disease) (H)    TECHNIQUE: Helical acquisition of image data was performed for the  chest without intravenous contrast.    COMPARISON: Chest CT 10/2/2023, 1/6/2023.    FINDINGS:    Lines and tubes: None.    Thyroid: Subcentimeter hypodense left thyroid nodule.    Heart is upper normal size. There is left atrial enlargement. No  pericardial effusion.     Vessels: Thoracic aorta and main pulmonary artery are normal in  caliber.    Lymph nodes: No enlarged axillary or mediastinal lymph nodes by short  axis criteria.     Airways: Central airways are patent. Interval worsening of diffuse  left saccular bronchiectasis.    Lungs/Pleura: Continued destructive fibrosis throughout the left lung  parenchyma. Similar diffuse peripheral, linear consolidative and  reticular opacities throughout the right lung, predominantly in the  lower lobe. No pneumothorax. No pleural effusion. No significant air  trapping on expiration phase.    Upper abdomen: No acute findings in the visualized portions of the  abdomen. Tiny hypodense, circumscribed lesions in the liver, too small  to characterize however likely to represent hepatic cysts. Pancreatic  atrophy. Small hiatal hernia.     Soft Tissues: Diffuse muscular atrophy.    Bones: Degenerative changes of the spine. Chronic osteopenic  compression deformities of T12 and L1.          Impression    IMPRESSION:    1. Continued fibrosis with saccular bronchiectasis now composing  nearly the entire left  hemithorax.  2. Similar diffuse fibrosis throughout the right lung, no new  consolidative opacities.  3. Other nonacute/chronic findings as described in the body of the  report.    I have personally reviewed the examination and initial interpretation  and I agree with the findings.    ARIEL PERALTA MD         SYSTEM ID:  [id number]       Date:    I personally reviewed and interpreted the chest radiographs.    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 Due to the added complexity in care, I will continue to support this patient in the subsequent management of this condition(s) and with the ongoing continuity of care of this condition        Again, thank you for allowing me to participate in the care of your patient.        Sincerely,        Lj Peraza MD

## 2024-11-19 NOTE — PATIENT INSTRUCTIONS
Thank you for seeing me at the lung clinic today. Here is some useful information for you.     Please sleep using your wedge as you are at high risk for aspiration causing worsening of your lung disease.  Continue using oxygen as prescribed  Let's try to get you to PULM REHAB-see below for more information  Please stay up to date with your vaccines through your PCP  We will refer you to a GI doctor who specializes in hiatal hernia and GI problems you have      See you back in 3 months.     Lj Peraza MD [facility]  Associate Professor of Medicine  Division of Pulmonary, Allergy & Critical Care   [facility]  [facility]      Pulmonary fibrosis is a condition where your lungs become scarred. This scarring can happen for different reasons.    Monitoring Your Health:    Symptoms to Watch: It's important to keep track of how you re feeling. Let us know if you notice you're slowing down a lot, needing frequent breaks due to shortness of breath, feeling short of breath even when resting, or if your oxygen levels are consistently low.    Tests and Assessments: We will monitor your condition through regular breathing tests (called spirometry or pulmonary function tests) and by checking your oxygen levels during walk tests. CT scans will be done only if necessary, as decided by your doctor.      Home Monitoring:    Using a Pulse Oximeter: Please check your oxygen levels at home, especially if you feel short of breath or while walking. You can buy a pulse oximeter from any pharmacy. If your oxygen levels are often below 88%, contact us right away as you may need further evaluation.      Pulmonary Rehabilitation:    If you haven't started pulmonary rehabilitation, talk to your pulmonologist about it. We can also help you get enrolled in a pulmonary rehabilitation program. Maintaining physical conditioning is essential due to risk for progressive weakness and muscle deconditioning,  which can affect your quality of life.      Helpful Resources:    Check out pulmonaryfibrosis.org (https://www.pulmonaryfibrosis.org) or call 797.TalkPFF for more information. This website has a lot of useful information about pulmonary fibrosis and offers a pulmonary rehabilitation toolkit, including exercise videos that you can safely do at home.      Research Trials:    If you're interested in learning about ongoing research trials at our center, please send us a message through [employer], and we can provide you with more details.    Stay informed and please take care of yourself. If you have any questions or concerns, don't hesitate to reach out to us.      Please reach out to our clinic if you have questions. We are easily accessible via [employer] where you can send messages directly to me and my team.     Lj Peraza MD FCCP  Associate Professor of Medicine  Division of Pulmonary, Allergy & Critical Care   [facility]  [facility]

## 2024-11-20 LAB
ERV-%PRED-PRE: 57 %
ERV-PRE: 0.43 L
ERV-PRED: 0.75 L
EXPTIME-PRE: 5.12 SEC
FEF2575-%PRED-PRE: 142 %
FEF2575-PRE: 1.98 L/SEC
FEF2575-PRED: 1.39 L/SEC
FEFMAX-%PRED-PRE: 55 %
FEFMAX-PRE: 2.42 L/SEC
FEFMAX-PRED: 4.37 L/SEC
FEV1-%PRED-PRE: 53 %
FEV1-PRE: 0.87 L
FEV1FEV6-PRE: 97 %
FEV1FEV6-PRED: 78 %
FEV1FVC-PRE: 97 %
FEV1FVC-PRED: 78 %
FEV1SVC-PRE: 99 %
FEV1SVC-PRED: 62 %
FIFMAX-PRE: 1.1 L/SEC
FVC-%PRED-PRE: 42 %
FVC-PRE: 0.89 L
FVC-PRED: 2.11 L
IC-%PRED-PRE: 30 %
IC-PRE: 0.45 L
IC-PRED: 1.5 L
VC-%PRED-PRE: 33 %
VC-PRE: 0.88 L
VC-PRED: 2.65 L

## 2024-12-02 ENCOUNTER — ANCILLARY PROCEDURE (OUTPATIENT)
Dept: MRI IMAGING | Facility: CLINIC | Age: 79
End: 2024-12-02
Attending: STUDENT IN AN ORGANIZED HEALTH CARE EDUCATION/TRAINING PROGRAM
Payer: MEDICARE

## 2024-12-02 DIAGNOSIS — D32.9 MENINGIOMA (H): ICD-10-CM

## 2024-12-02 PROCEDURE — A9585 GADOBUTROL INJECTION: HCPCS | Performed by: RADIOLOGY

## 2024-12-02 PROCEDURE — 70553 MRI BRAIN STEM W/O & W/DYE: CPT | Mod: TC | Performed by: RADIOLOGY

## 2024-12-02 PROCEDURE — G1010 CDSM STANSON: HCPCS | Performed by: RADIOLOGY

## 2024-12-02 RX ORDER — GADOBUTROL 604.72 MG/ML
4.5 INJECTION INTRAVENOUS ONCE
Status: COMPLETED | OUTPATIENT
Start: 2024-12-02 | End: 2024-12-02

## 2024-12-02 RX ADMIN — GADOBUTROL 4.5 ML: 604.72 INJECTION INTRAVENOUS at 09:15

## 2024-12-04 ENCOUNTER — TELEPHONE (OUTPATIENT)
Dept: NEUROSURGERY | Facility: CLINIC | Age: 79
End: 2024-12-04
Payer: MEDICARE

## 2024-12-04 NOTE — TELEPHONE ENCOUNTER
Left Voicemail (1st Attempt) for the patient to call back and schedule the following:    Location: Any  Provider: Dr. Rainey  Appointment type: New Adult Neurosurgery  Appointment mode: In person    Specialty phone number: [phone]

## 2024-12-04 NOTE — TELEPHONE ENCOUNTER
Patient was previously scheduled with Dr. Rainey without updated MRI. Appointment was cancelled.     Patient now has updated brain MRI and appt with Dr. Rainey needs to be rescheduled. Dr. Rainey also requesting we make sure we have her OSH send the original pathology report to have available for the appointment.    Encounter routed to scheduling team and chart prep team to assist.

## 2024-12-04 NOTE — TELEPHONE ENCOUNTER
12/4 - Fax requested original pathology report in relation to dx of meningioma from [facility] (#[phone]) and [facility] (#[phone])    12/5 - [facility] confirmed they do not have any pathology reports. Waiting to hear back from [facility].

## 2024-12-18 ENCOUNTER — HOSPITAL ENCOUNTER (INPATIENT)
Facility: CLINIC | Age: 79
End: 2024-12-18
Attending: STUDENT IN AN ORGANIZED HEALTH CARE EDUCATION/TRAINING PROGRAM | Admitting: INTERNAL MEDICINE
Payer: MEDICARE

## 2024-12-18 DIAGNOSIS — N39.0 URINARY TRACT INFECTION WITHOUT HEMATURIA, SITE UNSPECIFIED: ICD-10-CM

## 2024-12-18 DIAGNOSIS — L89.152 PRESSURE INJURY OF SACRAL REGION, STAGE 2 (H): ICD-10-CM

## 2024-12-18 DIAGNOSIS — D50.9 IRON DEFICIENCY ANEMIA, UNSPECIFIED IRON DEFICIENCY ANEMIA TYPE: ICD-10-CM

## 2024-12-18 DIAGNOSIS — I45.10 RIGHT BUNDLE BRANCH BLOCK: ICD-10-CM

## 2024-12-18 DIAGNOSIS — A41.9 SEPSIS, DUE TO UNSPECIFIED ORGANISM, UNSPECIFIED WHETHER ACUTE ORGAN DYSFUNCTION PRESENT (H): ICD-10-CM

## 2024-12-18 DIAGNOSIS — J96.11 CHRONIC RESPIRATORY FAILURE WITH HYPOXIA (H): Primary | ICD-10-CM

## 2024-12-18 DIAGNOSIS — D32.9 MENINGIOMA (H): ICD-10-CM

## 2024-12-18 DIAGNOSIS — Z99.81 DEPENDENCE ON SUPPLEMENTAL OXYGEN: ICD-10-CM

## 2024-12-18 LAB
ALBUMIN UR-MCNC: 30 MG/DL
ANION GAP SERPL CALCULATED.3IONS-SCNC: 10 MMOL/L (ref 7–15)
APPEARANCE UR: ABNORMAL
BACTERIA #/AREA URNS HPF: ABNORMAL /HPF
BASE EXCESS BLDV CALC-SCNC: 20.4 MMOL/L (ref -3–3)
BASOPHILS # BLD AUTO: 0 10E3/UL (ref 0–0.2)
BASOPHILS NFR BLD AUTO: 0 %
BILIRUB UR QL STRIP: NEGATIVE
BUN SERPL-MCNC: 8.4 MG/DL (ref 8–23)
CALCIUM SERPL-MCNC: 8.9 MG/DL (ref 8.8–10.4)
CHLORIDE SERPL-SCNC: 92 MMOL/L (ref 98–107)
COLOR UR AUTO: YELLOW
CREAT SERPL-MCNC: 0.52 MG/DL (ref 0.51–0.95)
EGFRCR SERPLBLD CKD-EPI 2021: >90 ML/MIN/1.73M2
EOSINOPHIL # BLD AUTO: 0 10E3/UL (ref 0–0.7)
EOSINOPHIL NFR BLD AUTO: 0 %
ERYTHROCYTE [DISTWIDTH] IN BLOOD BY AUTOMATED COUNT: 16.7 % (ref 10–15)
GLUCOSE SERPL-MCNC: 134 MG/DL (ref 70–99)
GLUCOSE UR STRIP-MCNC: NEGATIVE MG/DL
HCO3 BLDV-SCNC: 48 MMOL/L (ref 21–28)
HCO3 SERPL-SCNC: 39 MMOL/L (ref 22–29)
HCT VFR BLD AUTO: 32.8 % (ref 35–47)
HGB BLD-MCNC: 10.1 G/DL (ref 11.7–15.7)
HGB UR QL STRIP: NEGATIVE
HYALINE CASTS: 1 /LPF
IMM GRANULOCYTES # BLD: 0.1 10E3/UL
IMM GRANULOCYTES NFR BLD: 0 %
INR PPP: 0.98 (ref 0.85–1.15)
KETONES UR STRIP-MCNC: NEGATIVE MG/DL
LEUKOCYTE ESTERASE UR QL STRIP: ABNORMAL
LYMPHOCYTES # BLD AUTO: 0.8 10E3/UL (ref 0.8–5.3)
LYMPHOCYTES NFR BLD AUTO: 7 %
MCH RBC QN AUTO: 26 PG (ref 26.5–33)
MCHC RBC AUTO-ENTMCNC: 30.8 G/DL (ref 31.5–36.5)
MCV RBC AUTO: 85 FL (ref 78–100)
MONOCYTES # BLD AUTO: 1 10E3/UL (ref 0–1.3)
MONOCYTES NFR BLD AUTO: 8 %
MUCOUS THREADS #/AREA URNS LPF: PRESENT /LPF
NEUTROPHILS # BLD AUTO: 10 10E3/UL (ref 1.6–8.3)
NEUTROPHILS NFR BLD AUTO: 84 %
NITRATE UR QL: POSITIVE
NRBC # BLD AUTO: 0 10E3/UL
NRBC BLD AUTO-RTO: 0 /100
O2/TOTAL GAS SETTING VFR VENT: 24 %
OXYHGB MFR BLDV: 33 % (ref 70–75)
PCO2 BLDV: 71 MM HG (ref 40–50)
PH BLDV: 7.44 [PH] (ref 7.32–7.43)
PH UR STRIP: 8 [PH] (ref 5–7)
PLATELET # BLD AUTO: 257 10E3/UL (ref 150–450)
PO2 BLDV: 23 MM HG (ref 25–47)
POTASSIUM SERPL-SCNC: 3.7 MMOL/L (ref 3.4–5.3)
RBC # BLD AUTO: 3.88 10E6/UL (ref 3.8–5.2)
RBC URINE: 2 /HPF
SAO2 % BLDV: 33.2 % (ref 70–75)
SODIUM SERPL-SCNC: 141 MMOL/L (ref 135–145)
SP GR UR STRIP: 1.02 (ref 1–1.03)
SQUAMOUS EPITHELIAL: <1 /HPF
TRANSITIONAL EPI: <1 /HPF
TROPONIN T SERPL HS-MCNC: 14 NG/L
TROPONIN T SERPL HS-MCNC: 15 NG/L
UROBILINOGEN UR STRIP-MCNC: NORMAL MG/DL
WBC # BLD AUTO: 11.9 10E3/UL (ref 4–11)
WBC URINE: 9 /HPF

## 2024-12-18 PROCEDURE — 84484 ASSAY OF TROPONIN QUANT: CPT | Performed by: STUDENT IN AN ORGANIZED HEALTH CARE EDUCATION/TRAINING PROGRAM

## 2024-12-18 PROCEDURE — 82805 BLOOD GASES W/O2 SATURATION: CPT | Performed by: STUDENT IN AN ORGANIZED HEALTH CARE EDUCATION/TRAINING PROGRAM

## 2024-12-18 PROCEDURE — 82310 ASSAY OF CALCIUM: CPT | Performed by: STUDENT IN AN ORGANIZED HEALTH CARE EDUCATION/TRAINING PROGRAM

## 2024-12-18 PROCEDURE — 87486 CHLMYD PNEUM DNA AMP PROBE: CPT | Performed by: INTERNAL MEDICINE

## 2024-12-18 PROCEDURE — 99285 EMERGENCY DEPT VISIT HI MDM: CPT | Mod: 25 | Performed by: STUDENT IN AN ORGANIZED HEALTH CARE EDUCATION/TRAINING PROGRAM

## 2024-12-18 PROCEDURE — 120N000002 HC R&B MED SURG/OB UMMC

## 2024-12-18 PROCEDURE — 96365 THER/PROPH/DIAG IV INF INIT: CPT | Performed by: STUDENT IN AN ORGANIZED HEALTH CARE EDUCATION/TRAINING PROGRAM

## 2024-12-18 PROCEDURE — 85610 PROTHROMBIN TIME: CPT | Performed by: STUDENT IN AN ORGANIZED HEALTH CARE EDUCATION/TRAINING PROGRAM

## 2024-12-18 PROCEDURE — 258N000003 HC RX IP 258 OP 636: Performed by: STUDENT IN AN ORGANIZED HEALTH CARE EDUCATION/TRAINING PROGRAM

## 2024-12-18 PROCEDURE — 36415 COLL VENOUS BLD VENIPUNCTURE: CPT | Performed by: STUDENT IN AN ORGANIZED HEALTH CARE EDUCATION/TRAINING PROGRAM

## 2024-12-18 PROCEDURE — 99222 1ST HOSP IP/OBS MODERATE 55: CPT | Mod: AI | Performed by: INTERNAL MEDICINE

## 2024-12-18 PROCEDURE — 87186 SC STD MICRODIL/AGAR DIL: CPT | Performed by: STUDENT IN AN ORGANIZED HEALTH CARE EDUCATION/TRAINING PROGRAM

## 2024-12-18 PROCEDURE — 93005 ELECTROCARDIOGRAM TRACING: CPT | Performed by: STUDENT IN AN ORGANIZED HEALTH CARE EDUCATION/TRAINING PROGRAM

## 2024-12-18 PROCEDURE — 81001 URINALYSIS AUTO W/SCOPE: CPT | Performed by: STUDENT IN AN ORGANIZED HEALTH CARE EDUCATION/TRAINING PROGRAM

## 2024-12-18 PROCEDURE — 80048 BASIC METABOLIC PNL TOTAL CA: CPT | Performed by: STUDENT IN AN ORGANIZED HEALTH CARE EDUCATION/TRAINING PROGRAM

## 2024-12-18 PROCEDURE — 93010 ELECTROCARDIOGRAM REPORT: CPT | Performed by: STUDENT IN AN ORGANIZED HEALTH CARE EDUCATION/TRAINING PROGRAM

## 2024-12-18 PROCEDURE — 250N000011 HC RX IP 250 OP 636: Performed by: STUDENT IN AN ORGANIZED HEALTH CARE EDUCATION/TRAINING PROGRAM

## 2024-12-18 PROCEDURE — 99285 EMERGENCY DEPT VISIT HI MDM: CPT | Performed by: STUDENT IN AN ORGANIZED HEALTH CARE EDUCATION/TRAINING PROGRAM

## 2024-12-18 PROCEDURE — 85025 COMPLETE CBC W/AUTO DIFF WBC: CPT | Performed by: STUDENT IN AN ORGANIZED HEALTH CARE EDUCATION/TRAINING PROGRAM

## 2024-12-18 RX ORDER — FOLIC ACID 1 MG/1
1000 TABLET ORAL DAILY
Status: DISPENSED | OUTPATIENT
Start: 2024-12-19

## 2024-12-18 RX ORDER — LIDOCAINE 40 MG/G
CREAM TOPICAL
Status: ACTIVE | OUTPATIENT
Start: 2024-12-18

## 2024-12-18 RX ORDER — MULTIVITAMIN WITH IRON
500 TABLET ORAL DAILY
Status: DISPENSED | OUTPATIENT
Start: 2024-12-19

## 2024-12-18 RX ORDER — CETIRIZINE HYDROCHLORIDE 10 MG/1
10 TABLET ORAL DAILY
Status: DISPENSED | OUTPATIENT
Start: 2024-12-19

## 2024-12-18 RX ORDER — DICYCLOMINE HYDROCHLORIDE 10 MG/1
10 CAPSULE ORAL 3 TIMES DAILY
Status: DISPENSED | OUTPATIENT
Start: 2024-12-19

## 2024-12-18 RX ORDER — CEFTRIAXONE 1 G/1
1 INJECTION, POWDER, FOR SOLUTION INTRAMUSCULAR; INTRAVENOUS EVERY 24 HOURS
Status: DISPENSED | OUTPATIENT
Start: 2024-12-19

## 2024-12-18 RX ORDER — POLYETHYLENE GLYCOL 3350 17 G/17G
17 POWDER, FOR SOLUTION ORAL DAILY PRN
Status: ACTIVE | OUTPATIENT
Start: 2024-12-18

## 2024-12-18 RX ORDER — CALCIUM CARBONATE 500 MG/1
1000 TABLET, CHEWABLE ORAL 4 TIMES DAILY PRN
Status: DISCONTINUED | OUTPATIENT
Start: 2024-12-18 | End: 2024-12-19

## 2024-12-18 RX ORDER — MECLIZINE HCL 12.5 MG 12.5 MG/1
12.5 TABLET ORAL 3 TIMES DAILY PRN
Status: ACTIVE | OUTPATIENT
Start: 2024-12-18

## 2024-12-18 RX ORDER — AMOXICILLIN 250 MG
1 CAPSULE ORAL 2 TIMES DAILY PRN
Status: ACTIVE | OUTPATIENT
Start: 2024-12-18

## 2024-12-18 RX ORDER — PANTOPRAZOLE SODIUM 40 MG/1
40 TABLET, DELAYED RELEASE ORAL
Status: DISPENSED | OUTPATIENT
Start: 2024-12-19

## 2024-12-18 RX ORDER — ATORVASTATIN CALCIUM 40 MG/1
40 TABLET, FILM COATED ORAL DAILY
Status: DISPENSED | OUTPATIENT
Start: 2024-12-19

## 2024-12-18 RX ORDER — DOXYCYCLINE 100 MG/1
100 CAPSULE ORAL ONCE
Status: COMPLETED | OUTPATIENT
Start: 2024-12-19 | End: 2024-12-19

## 2024-12-18 RX ORDER — FAMOTIDINE 20 MG/1
20 TABLET, FILM COATED ORAL 2 TIMES DAILY PRN
Status: ACTIVE | OUTPATIENT
Start: 2024-12-18

## 2024-12-18 RX ORDER — AMOXICILLIN 250 MG
2 CAPSULE ORAL 2 TIMES DAILY PRN
Status: ACTIVE | OUTPATIENT
Start: 2024-12-18

## 2024-12-18 RX ORDER — ALENDRONATE SODIUM 70 MG/1
70 TABLET ORAL
Status: DISCONTINUED | OUTPATIENT
Start: 2024-12-18 | End: 2024-12-19

## 2024-12-18 RX ORDER — MAGNESIUM OXIDE 400 MG/1
400 TABLET ORAL DAILY
Status: DISPENSED | OUTPATIENT
Start: 2024-12-19

## 2024-12-18 RX ORDER — CEFTRIAXONE 1 G/1
1 INJECTION, POWDER, FOR SOLUTION INTRAMUSCULAR; INTRAVENOUS ONCE
Status: COMPLETED | OUTPATIENT
Start: 2024-12-18 | End: 2024-12-19

## 2024-12-18 RX ORDER — ASPIRIN 81 MG/1
81 TABLET ORAL DAILY
Status: DISPENSED | OUTPATIENT
Start: 2024-12-19

## 2024-12-18 RX ORDER — FERROUS SULFATE 325(65) MG
325 TABLET ORAL DAILY
Status: DISPENSED | OUTPATIENT
Start: 2024-12-19

## 2024-12-18 RX ADMIN — SODIUM CHLORIDE 1000 ML: 9 INJECTION, SOLUTION INTRAVENOUS at 23:18

## 2024-12-18 RX ADMIN — CEFTRIAXONE SODIUM 1 G: 1 INJECTION, POWDER, FOR SOLUTION INTRAMUSCULAR; INTRAVENOUS at 23:17

## 2024-12-18 ASSESSMENT — ACTIVITIES OF DAILY LIVING (ADL)
ADLS_ACUITY_SCORE: 41

## 2024-12-18 ASSESSMENT — COLUMBIA-SUICIDE SEVERITY RATING SCALE - C-SSRS
6. HAVE YOU EVER DONE ANYTHING, STARTED TO DO ANYTHING, OR PREPARED TO DO ANYTHING TO END YOUR LIFE?: NO
2. HAVE YOU ACTUALLY HAD ANY THOUGHTS OF KILLING YOURSELF IN THE PAST MONTH?: NO
1. IN THE PAST MONTH, HAVE YOU WISHED YOU WERE DEAD OR WISHED YOU COULD GO TO SLEEP AND NOT WAKE UP?: NO

## 2024-12-19 VITALS
DIASTOLIC BLOOD PRESSURE: 49 MMHG | WEIGHT: 91.93 LBS | RESPIRATION RATE: 18 BRPM | SYSTOLIC BLOOD PRESSURE: 108 MMHG | OXYGEN SATURATION: 97 % | TEMPERATURE: 97.3 F | HEIGHT: 60 IN | HEART RATE: 71 BPM | BODY MASS INDEX: 18.05 KG/M2

## 2024-12-19 LAB
ATRIAL RATE - MUSE: 81 BPM
BACTERIA BLD CULT: NORMAL
BACTERIA BLD CULT: NORMAL
BACTERIA UR CULT: ABNORMAL
C PNEUM DNA SPEC QL NAA+PROBE: NOT DETECTED
DIASTOLIC BLOOD PRESSURE - MUSE: NORMAL MMHG
FLUAV H1 2009 PAND RNA SPEC QL NAA+PROBE: NOT DETECTED
FLUAV H1 RNA SPEC QL NAA+PROBE: NOT DETECTED
FLUAV H3 RNA SPEC QL NAA+PROBE: NOT DETECTED
FLUAV RNA SPEC QL NAA+PROBE: NOT DETECTED
FLUBV RNA SPEC QL NAA+PROBE: NOT DETECTED
HADV DNA SPEC QL NAA+PROBE: NOT DETECTED
HCOV PNL SPEC NAA+PROBE: NOT DETECTED
HMPV RNA SPEC QL NAA+PROBE: NOT DETECTED
HPIV1 RNA SPEC QL NAA+PROBE: NOT DETECTED
HPIV2 RNA SPEC QL NAA+PROBE: NOT DETECTED
HPIV3 RNA SPEC QL NAA+PROBE: NOT DETECTED
HPIV4 RNA SPEC QL NAA+PROBE: DETECTED
INTERPRETATION ECG - MUSE: NORMAL
LACTATE SERPL-SCNC: 1.8 MMOL/L (ref 0.7–2)
LVEF ECHO: NORMAL
M PNEUMO DNA SPEC QL NAA+PROBE: NOT DETECTED
P AXIS - MUSE: 67 DEGREES
PR INTERVAL - MUSE: 132 MS
QRS DURATION - MUSE: 128 MS
QT - MUSE: 408 MS
QTC - MUSE: 473 MS
R AXIS - MUSE: 84 DEGREES
RSV RNA SPEC QL NAA+PROBE: NOT DETECTED
RSV RNA SPEC QL NAA+PROBE: NOT DETECTED
RV+EV RNA SPEC QL NAA+PROBE: NOT DETECTED
SYSTOLIC BLOOD PRESSURE - MUSE: NORMAL MMHG
T AXIS - MUSE: -3 DEGREES
VENTRICULAR RATE- MUSE: 81 BPM

## 2024-12-19 PROCEDURE — 120N000002 HC R&B MED SURG/OB UMMC

## 2024-12-19 PROCEDURE — 250N000013 HC RX MED GY IP 250 OP 250 PS 637: Performed by: INTERNAL MEDICINE

## 2024-12-19 PROCEDURE — 94640 AIRWAY INHALATION TREATMENT: CPT

## 2024-12-19 PROCEDURE — 87040 BLOOD CULTURE FOR BACTERIA: CPT | Performed by: STUDENT IN AN ORGANIZED HEALTH CARE EDUCATION/TRAINING PROGRAM

## 2024-12-19 PROCEDURE — 250N000011 HC RX IP 250 OP 636: Performed by: INTERNAL MEDICINE

## 2024-12-19 PROCEDURE — 250N000013 HC RX MED GY IP 250 OP 250 PS 637: Performed by: STUDENT IN AN ORGANIZED HEALTH CARE EDUCATION/TRAINING PROGRAM

## 2024-12-19 PROCEDURE — 250N000009 HC RX 250: Performed by: STUDENT IN AN ORGANIZED HEALTH CARE EDUCATION/TRAINING PROGRAM

## 2024-12-19 PROCEDURE — 36415 COLL VENOUS BLD VENIPUNCTURE: CPT | Performed by: STUDENT IN AN ORGANIZED HEALTH CARE EDUCATION/TRAINING PROGRAM

## 2024-12-19 PROCEDURE — 999N000157 HC STATISTIC RCP TIME EA 10 MIN

## 2024-12-19 PROCEDURE — 83605 ASSAY OF LACTIC ACID: CPT | Performed by: STUDENT IN AN ORGANIZED HEALTH CARE EDUCATION/TRAINING PROGRAM

## 2024-12-19 PROCEDURE — 99233 SBSQ HOSP IP/OBS HIGH 50: CPT | Performed by: STUDENT IN AN ORGANIZED HEALTH CARE EDUCATION/TRAINING PROGRAM

## 2024-12-19 RX ORDER — DOXYCYCLINE 100 MG/1
100 CAPSULE ORAL 2 TIMES DAILY
Status: DISPENSED | OUTPATIENT
Start: 2024-12-19

## 2024-12-19 RX ORDER — IPRATROPIUM BROMIDE AND ALBUTEROL SULFATE 2.5; .5 MG/3ML; MG/3ML
3 SOLUTION RESPIRATORY (INHALATION) EVERY 4 HOURS PRN
Status: DISPENSED | OUTPATIENT
Start: 2024-12-19

## 2024-12-19 RX ORDER — GUAIFENESIN 200 MG/10ML
200 LIQUID ORAL ONCE
Status: COMPLETED | OUTPATIENT
Start: 2024-12-19 | End: 2024-12-19

## 2024-12-19 RX ORDER — DOXYCYCLINE 100 MG/1
100 CAPSULE ORAL DAILY
Status: DISCONTINUED | OUTPATIENT
Start: 2024-12-19 | End: 2024-12-19

## 2024-12-19 RX ORDER — GUAIFENESIN 200 MG/10ML
200 LIQUID ORAL 2 TIMES DAILY PRN
Status: ACTIVE | OUTPATIENT
Start: 2024-12-19

## 2024-12-19 RX ADMIN — ATORVASTATIN CALCIUM 40 MG: 40 TABLET, FILM COATED ORAL at 09:29

## 2024-12-19 RX ADMIN — DOXYCYCLINE HYCLATE 100 MG: 100 CAPSULE ORAL at 13:34

## 2024-12-19 RX ADMIN — FERROUS SULFATE TAB 325 MG (65 MG ELEMENTAL FE) 325 MG: 325 (65 FE) TAB at 09:29

## 2024-12-19 RX ADMIN — Medication 12.5 MG: at 09:29

## 2024-12-19 RX ADMIN — Medication 12.5 MG: at 21:53

## 2024-12-19 RX ADMIN — MAGNESIUM OXIDE TAB 400 MG (241.3 MG ELEMENTAL MG) 400 MG: 400 (241.3 MG) TAB at 09:29

## 2024-12-19 RX ADMIN — CYANOCOBALAMIN TAB 500 MCG 500 MCG: 500 TAB at 09:29

## 2024-12-19 RX ADMIN — DICYCLOMINE HYDROCHLORIDE 10 MG: 10 CAPSULE ORAL at 13:57

## 2024-12-19 RX ADMIN — IPRATROPIUM BROMIDE AND ALBUTEROL SULFATE 3 ML: .5; 3 SOLUTION RESPIRATORY (INHALATION) at 20:28

## 2024-12-19 RX ADMIN — DICYCLOMINE HYDROCHLORIDE 10 MG: 10 CAPSULE ORAL at 09:29

## 2024-12-19 RX ADMIN — PANTOPRAZOLE SODIUM 40 MG: 40 TABLET, DELAYED RELEASE ORAL at 09:30

## 2024-12-19 RX ADMIN — DOXYCYCLINE HYCLATE 100 MG: 100 CAPSULE ORAL at 00:52

## 2024-12-19 RX ADMIN — GUAIFENESIN 200 MG: 200 SOLUTION ORAL at 13:34

## 2024-12-19 RX ADMIN — FOLIC ACID 1000 MCG: 1 TABLET ORAL at 09:30

## 2024-12-19 RX ADMIN — PANTOPRAZOLE SODIUM 40 MG: 40 TABLET, DELAYED RELEASE ORAL at 17:30

## 2024-12-19 RX ADMIN — DOXYCYCLINE HYCLATE 100 MG: 100 CAPSULE ORAL at 20:57

## 2024-12-19 RX ADMIN — CETIRIZINE HYDROCHLORIDE 10 MG: 10 TABLET, FILM COATED ORAL at 13:34

## 2024-12-19 RX ADMIN — CEFTRIAXONE SODIUM 1 G: 1 INJECTION, POWDER, FOR SOLUTION INTRAMUSCULAR; INTRAVENOUS at 20:57

## 2024-12-19 RX ADMIN — ASPIRIN 81 MG: 81 TABLET ORAL at 09:29

## 2024-12-19 RX ADMIN — DICYCLOMINE HYDROCHLORIDE 10 MG: 10 CAPSULE ORAL at 22:24

## 2024-12-19 ASSESSMENT — ACTIVITIES OF DAILY LIVING (ADL)
ADLS_ACUITY_SCORE: 55
DOING_ERRANDS_INDEPENDENTLY_DIFFICULTY: YES
ADLS_ACUITY_SCORE: 59
ADLS_ACUITY_SCORE: 59
ADLS_ACUITY_SCORE: 41
ADLS_ACUITY_SCORE: 61
ADLS_ACUITY_SCORE: 55
ADLS_ACUITY_SCORE: 59
ADLS_ACUITY_SCORE: 55
ADLS_ACUITY_SCORE: 41
ADLS_ACUITY_SCORE: 55
TOILETING_ISSUES: NO
ADLS_ACUITY_SCORE: 55
ADLS_ACUITY_SCORE: 55
ADLS_ACUITY_SCORE: 59
ADLS_ACUITY_SCORE: 55
ADLS_ACUITY_SCORE: 55
FALL_HISTORY_WITHIN_LAST_SIX_MONTHS: NO
ADLS_ACUITY_SCORE: 41
ADLS_ACUITY_SCORE: 59
DRESSING/BATHING_DIFFICULTY: NO
ADLS_ACUITY_SCORE: 55
CHANGE_IN_FUNCTIONAL_STATUS_SINCE_ONSET_OF_CURRENT_ILLNESS/INJURY: NO
ADLS_ACUITY_SCORE: 55

## 2024-12-19 NOTE — H&P
History and Physical  Department of Internal Medicine    Patient Name: Vandana Ivey MRN# [id number]   Age: 79 year old YOB: 1945     Date of Admission:12/18/2024    Primary care provider: Ashwini Goode  Date of Service: 12/18/2024  Admitting Team: Night 2         Assessment and Plan:   Vandana Ivey is a 79 year old female with past medical history significant for pulmonary fibrosis with chronic home O22.5 Liters baseline history of meningioma status post resection , hiatal hernia s/p repair, IBS, chronic dizziness, osteoporosis presented from home with progressive dizziness and shortness of breath admitted for sepsis     Sepsis  UTI  Community-acquired ammonia  Status post 1 L of normal saline in the ED started on ceftriaxone and doxycycline  -Continue antibiotics /ceftriaxone and doxycycline  -Respiratory viral panel  -Sputum culture if possible  -Bolus as needed, caution with IV fluid given concern for pulmonary edema    Acute on chronic hypoxic hypercapnic respiratory failure  PF/idiopathic pulmonary fibrosis)  On 2 to 3 L home oxygen.  Follows with pulmonary, last seen in clinic 11/19 still under workup for ILD.  To restart immunomodulating therapy.  Could have component of pulmonary hypertension  -Continue oxygen support  -Echocardiogram in the morning  -Consider pulmonary consult      Goals of care discussion  Patient reports she wants to think about it if she wants to be full code or DNR.  Will order full code for now.  Given pulmonary fibrosis and progressive hypercapnia and hypoxia.  I am worried that she might require intubation during this hospitalization.  Patient is also more keen towards going home than any prolonged hospitalization or rehab.  Daughter is aware and understands. Patient is currently able to take her own medication at home and lives independently, gets Meals on Wheels   -Continue goals of care discussion     Polypharmacy  Given her age and concerned the patient  is on many centrally acting medication. -Holding Ativan, Lyrica, Remeron, trazodone  -Consider pharmacy consult    Chronic medical condition    Osteoporosis without current pathological fracture, unspecified osteoporosis type  Hiatal hernia  Gastroesophageal reflux disease   Dizziness  Benign paroxysmal positional vertigo, unspecified laterality  Meningioma (H) s/p resection.   Insomnia,   Irritable bowel syndrome with constipation  Gastroparesis        CODE: Full code for now/patient reports he wants to think about this more  Diet/IVF: Regular diet  DVT ppx: SCD  Disposition/Admission Status: Anticipate 1-2 days, may be here more than 2 midnights    Darin Parks MD  Hospitalist/nocturnist  [facility]    Departments of Medicine   Pager: [phone]               Chief Complaint:   Shortness of breath       HPI:   Vandana Ivey is a 79 year old female with past medical history significant for pulmonary fibrosis with chronic home O22.5 Liters baseline history of meningioma status post resection , hiatal hernia s/p repair, IBS, chronic dizziness, osteoporosis presented from home with progressive dizziness and shortness of breath.  Progressively getting more short of breath over the past 2 weeks patient uses about 2.5 L oxygen at home for her pulmonary fibrosis.  She also have chronic dizziness but today she felt more dizzy difficulty getting around the house, patient lives independently but gets Meals on Wheels .  She does not endorse any fever no chest pain, does have some poor appetite recently.  Nonproductive cough.            Past Medical History:     Osteoporosis without current pathological fracture, unspecified osteoporosis type  Hiatal hernia  Gastroesophageal reflux disease   Dizziness  Benign paroxysmal positional vertigo, unspecified laterality  Meningioma (H) s/p resection.   Insomnia,   Irritable bowel syndrome with constipation  Gastroparesis       Past Surgical  History:   Hemangioma resection  Hiatal hernia surgery           Social History:     Social History     Socioeconomic History    Marital status:      Spouse name: Not on file    Number of children: Not on file    Years of education: Not on file    Highest education level: Not on file   Occupational History    Not on file   Tobacco Use    Smoking status: Never     Passive exposure: Never    Smokeless tobacco: Never   Vaping Use    Vaping status: Never Used   Substance and Sexual Activity    Alcohol use: Never    Drug use: Never    Sexual activity: Not on file   Other Topics Concern    Not on file   Social History Narrative    Not on file     Social Drivers of Health     Financial Resource Strain: Not on file   Food Insecurity: Not on file   Transportation Needs: Not on file   Physical Activity: Not on file   Stress: Not on file   Social Connections: Not on file   Interpersonal Safety: Not on file   Housing Stability: Not on file            Family History:   History reviewed. No pertinent family history.           Immunizations:     Immunization History   Administered Date(s) Administered    COVID-19 12+ (MODERNA) 03/12/2021, 04/09/2021    Influenza (IIV3) PF 10/24/2017    Influenza Vaccine >6 months,quad, PF 09/04/2013, 09/24/2014, 10/08/2015, 10/26/2016, 01/13/2023    Influenza, Split Virus, Trivalent, Pf (Fluzone\Fluarix) 10/22/2012    Pneumo Conj 13-V (2010&after) 09/16/2019    Pneumococcal 23 valent 10/16/2013    TDAP (Adacel,Boostrix) 07/24/2015    Td (Adult), Adsorbed 01/10/2003              Allergies:      Allergies   Allergen Reactions    Diclofenac      GI intolerance    Erythromycin      GI intolerance            Medications:     Current Facility-Administered Medications   Medication Dose Route Frequency Provider Last Rate Last Admin    cefTRIAXone (ROCEPHIN) 1 g vial to attach to  mL bag for ADULTS or NS 50 mL bag for PEDS  1 g Intravenous Once CousinMoses reeder MD   1 g at 12/18/24 9372     doxycycline hyclate (VIBRAMYCIN) capsule 100 mg  100 mg Oral Once GenevievesinMoses reeder MD        sodium chloride 0.9% BOLUS 1,000 mL  1,000 mL Intravenous Once Moses Sahu MD 1,000 mL/hr at 12/18/24 2318 1,000 mL at 12/18/24 2318     Current Outpatient Medications   Medication Sig Dispense Refill    alendronate (FOSAMAX) 70 MG tablet Take 1 tablet (70 mg) by mouth every 7 days. 12 tablet 3    aspirin 81 MG EC tablet Take 81 mg by mouth daily.      atorvastatin (LIPITOR) 40 MG tablet Take 1 tablet (40 mg) by mouth daily at 2 pm. 90 tablet 3    cetirizine (ZYRTEC) 10 MG tablet Take 1 tablet by mouth daily at 2 pm.      cyanocobalamin (VITAMIN B-12) 500 MCG tablet Take 1 tablet (500 mcg) by mouth daily.      dicyclomine (BENTYL) 10 MG capsule Take 1 capsule (10 mg) by mouth 3 times daily. 90 capsule 5    famotidine (PEPCID) 20 MG tablet Take 20 mg by mouth 2 times daily as needed (gastric reflux or symptoms).      FEROSUL 325 (65 Fe) MG tablet Take 1 tablet by mouth daily at 2 pm.      folic acid (FOLVITE) 1 MG tablet Take 1 tablet by mouth daily at 2 pm.      LORazepam (ATIVAN) 1 MG tablet Take 0.5-1 tablets (0.5-1 mg) by mouth once as needed for anxiety (30-60 minutes prior to MRI, bring a ). 1 tablet 0    magnesium 250 MG tablet Take 1 tablet by mouth daily.      meclizine (ANTIVERT) 12.5 MG tablet Take 1 tablet (12.5 mg) by mouth 3 times daily as needed for dizziness. 60 tablet 1    metoclopramide (REGLAN) 5 MG tablet Take 0.5 tablets (2.5 mg) by mouth 3 times daily as needed (before meals for nausea). 90 tablet 5    metoprolol tartrate (LOPRESSOR) 25 MG tablet Take 0.5 tablets (12.5 mg) by mouth 2 times daily. 90 tablet 1    mirtazapine (REMERON) 7.5 MG tablet Take 2 tablets (15 mg) by mouth at bedtime. 180 tablet 1    pantoprazole (PROTONIX) 40 MG EC tablet Take 1 tablet (40 mg) by mouth 2 times daily. 60 tablet 5    polyethylene glycol (MIRALAX) 17 GM/Dose powder Take 17 g (1 Capful) by mouth daily as  needed for constipation. 510 g 1    pregabalin (LYRICA) 50 MG capsule Take 1 capsule (50 mg) by mouth 2 times daily. 60 capsule 5    Thiamine HCl (B-1) 100 MG TABS Take 1 tablet by mouth daily at 2 pm.      traZODone (DESYREL) 100 MG tablet Take 1 tablet (100 mg) by mouth at bedtime. 90 tablet 1    vitamin E 400 units TABS Take 400 Units by mouth daily.              Review of Systems:     A complete ROS was performed and is negative other than what is stated in the HPI.          Physical Exam:   Blood pressure 99/66, pulse 74, temperature 98.5  F (36.9  C), resp. rate 16, SpO2 93%.  General Appearance: Mild respiratory distress  HEENT: No jaundice, moist BM   Respiratory: Diffuse crackles and poor air movement  Cardiovascular: RRR, s1, s2 no m/g   GI: Soft,   Genitourinary: No CVAT   Extremities : No Edema   Neurologic: A&Ox3,             Data:   Reviewed in Epic

## 2024-12-19 NOTE — PLAN OF CARE
Problem: Airway Clearance Ineffective  Goal: Effective Airway Clearance  Outcome: Not Progressing    6MS ADMISSION    D: Patient admitted/transferred from ED [facility] via cart for influenza.     I: Upon arrival to the unit patient was oriented to room, unit, and call light. Patient s height, weight, and vital signs were obtained. Allergies reviewed and allergy band applied. Provider notified of patient s arrival on the unit (Dr GUILLEN [id number]). Adult AVS completed. Head to toe assessment completed. Education assessment completed. Care plan initiated.    A: Vital signs stable upon admission. Patient rates pain at 0/10. Two RN skin assessment completed YES. Second RN was SB. Significant Skin Findings include dry and flaky skin, open wound on the bottom. WO Nurse Consult Ordered No .       P: Continue to monitor patient s O2 saturation and intervene as needed. Continue with plan of care. Notify provider with any concerns or changes in patient status.       VS:  Temp: 97.5  F (36.4  C) Temp src: Oral BP: (!) 142/68 Pulse: 66   Resp: 16 SpO2: 96 % O2 Device: Nasal cannula Oxygen Delivery: 3 LPM     O2: SpO2 > 96 and stable on 3 LPM via NC.   + productive cough  + wheezes   Output:  incontinent   Last BM:  denies abdominal discomfort. BS active / passing flatus.    Activity:  SBA / walker   Skin: dry and flaky skin   Open wound on the bottom    Pain:  0/10    CMS: Aox3-4   Dressing:  Mepilex border on the bottom    Diet: Regular diet. Denies nausea/vomiting.    LDA: L PIV SL    Equipment: IV pole, personal belongings,    Plan: CONTACT and DROPLET precautions maintained / Continue with plan of care. Call light within reach  Plan: antibiotic   Additional Info:  Bed alarm on   Per daughter, patient is using 2 - 2.5 LPM NC at home  Per patient, her O2 sats at hoem dips to 60s to 80s        2717 sent ba message to EMIL    2533 sent a message to RT for the neb treatment

## 2024-12-19 NOTE — ED TRIAGE NOTES
Patient presents to ED with CC of dizziness, SOB, mid sternal Chest pain/rib pain . Daughter states symptoms have been going on for a couple weeks. Dizziness was very bad today prompting patient to come in.      Hx of pulmonary fibrosis. Wears 2.5 l o2

## 2024-12-19 NOTE — ED PROVIDER NOTES
[place] EMERGENCY DEPARTMENT ([facility])    12/18/24       ED PROVIDER NOTE     History     Chief Complaint   Patient presents with    Dizziness    Shortness of Breath    Chest Pain     The history is provided by the patient, a relative and medical records.     Vandana Ivey is a 79 year old female with a history of pulmonary fibrosis with chronic hypoxic respiratory failure on 2.5 L O2 baseline, GERD, and hiatal hernia s/p repair who presents to the ED for evaluation of dizziness and shortness of breath.  Patient was previously on 2 L O2 at baseline but several weeks ago she reported feeling increasingly short of breath, so her family increase this to 2.5 L with some improvement.  In the past 2 weeks or so she has began having an new cough and worsening shortness of breath.  Today she has reported feeling dizzy/lightheaded.  She has had difficulty getting around her home because of this.  She lives alone.  No recent fevers.  No nausea/vomiting.  She additionally reports central chest discomfort that has been intermittent for a couple of weeks.    Past Medical History  History reviewed. No pertinent past medical history.  History reviewed. No pertinent surgical history.  alendronate (FOSAMAX) 70 MG tablet  aspirin 81 MG EC tablet  atorvastatin (LIPITOR) 40 MG tablet  cetirizine (ZYRTEC) 10 MG tablet  cyanocobalamin (VITAMIN B-12) 500 MCG tablet  dicyclomine (BENTYL) 10 MG capsule  famotidine (PEPCID) 20 MG tablet  FEROSUL 325 (65 Fe) MG tablet  folic acid (FOLVITE) 1 MG tablet  LORazepam (ATIVAN) 1 MG tablet  magnesium 250 MG tablet  meclizine (ANTIVERT) 12.5 MG tablet  metoclopramide (REGLAN) 5 MG tablet  metoprolol tartrate (LOPRESSOR) 25 MG tablet  mirtazapine (REMERON) 7.5 MG tablet  pantoprazole (PROTONIX) 40 MG EC tablet  polyethylene glycol (MIRALAX) 17 GM/Dose powder  pregabalin (LYRICA) 50 MG capsule  Thiamine HCl (B-1) 100 MG TABS  traZODone (DESYREL) 100 MG tablet  vitamin E 400 units  TABS      Allergies   Allergen Reactions    Diclofenac      GI intolerance    Erythromycin      GI intolerance     Family History  History reviewed. No pertinent family history.  Social History   Social History     Tobacco Use    Smoking status: Never     Passive exposure: Never    Smokeless tobacco: Never   Vaping Use    Vaping status: Never Used   Substance Use Topics    Alcohol use: Never    Drug use: Never      Past medical history, past surgical history, medications, allergies, family history, and social history were reviewed with the patient. No additional pertinent items.     A complete review of systems was performed with pertinent positives and negatives noted in the HPI, and all other systems negative.    Physical Exam   BP: 99/66  Pulse: 74  Temp: 98.5  F (36.9  C)  Resp: 16  SpO2: 93 %    Physical Exam  Vitals and nursing note reviewed.   Constitutional:       General: She is not in acute distress.     Appearance: Normal appearance. She is not diaphoretic.   HENT:      Head: Atraumatic.      Mouth/Throat:      Mouth: Mucous membranes are moist.   Eyes:      General: No scleral icterus.     Conjunctiva/sclera: Conjunctivae normal.   Cardiovascular:      Rate and Rhythm: Normal rate.      Heart sounds: Normal heart sounds.   Pulmonary:      Effort: No respiratory distress.      Breath sounds: Normal breath sounds.      Comments: Crackles in bilateral lungs, coarse breath sounds bilaterally, mildly tachypneic  Abdominal:      General: Abdomen is flat.   Musculoskeletal:      Cervical back: Neck supple.   Skin:     General: Skin is warm.      Findings: No rash.   Neurological:      Mental Status: She is alert.       ED Course, Procedures, & Data      Procedures            EKG Interpretation:      Interpreted by Luis Alberto Lopes MD  Time reviewed: 20:50:42   Symptoms at time of EKG: Shortness of breath, Dizziness   Rhythm: Normal sinus   Rate: 81 bpm  Axis: Normal  Ectopy: None  Conduction: Right bundle  branch block   ST Segments/ T Waves: No ST-T wave changes and No acute ischemic changes  Q Waves: None  Comparison to prior: No prior EKGs    Clinical Impression: No acute ischemia             No results found for any visits on 12/18/24.  Medications - No data to display  Labs Ordered and Resulted from Time of ED Arrival to Time of ED Departure - No data to display  No orders to display          Critical care was not performed.     Medical Decision Making  The patient's presentation was of high complexity (an acute health issue posing potential threat to life or bodily function).    The patient's evaluation involved:  review of external note(s) from 3+ sources (see separate area of note for details)  review of 3+ test result(s) ordered prior to this encounter (see separate area of note for details)  ordering and/or review of 3+ test(s) in this encounter (see separate area of note for details)    The patient's management necessitated high risk (a decision regarding hospitalization).    Assessment & Plan    Patient's labs reviewed and interpreted by me significant for urinary tract infection, mild leukocytosis to 12, mildly elevated high-sensitivity troponin of 15, pending repeat, and a metabolic alkalosis  Ordered ceftriaxone and then doxycycline once x-ray showed possible pneumonia for UTI and possible community-acquired pneumonia, along with fluid bolus  Discussed case with internal medicine physician Dr. Parks who accepted patient for admission for sepsis, likely secondary to UTI, possible pneumonia, and PT OT as patient is unable to safely move around her house and lives alone.    I have reviewed the nursing notes. I have reviewed the findings, diagnosis, plan and need for follow up with the patient.    New Prescriptions    No medications on file       Final diagnoses:   None     I, Shadia Cervantes, am serving as a trained medical scribe to document services personally performed by Moses Sahu MD based on the  provider's statements to me on December 18, 2024.  This document has been checked and approved by the attending provider.    I, Moses Sahu MD, was physically present and have reviewed and verified the accuracy of this note documented by Shadia Cervantes medical scribe.      Moses Sahu MD  [facility] EMERGENCY DEPARTMENT  12/18/2024     Moses Sahu MD  12/18/24 2625

## 2024-12-19 NOTE — PHARMACY
"The following home medications were NOT continued on inpatient admission per \"Discontinuation of nonessential home medications during hospitalization\" policy: alendronate (FOSAMAX) tablet 70 mg     If a therapeutic holiday is deemed inappropriate per the prescriber, please notify the pharmacist regarding the medication order.    The pharmacist is available to answer any questions and/or concerns the patient may have regarding discontinuation of non-essential medications.    Please ensure that these medications are restarted as needed upon discharge via the medication reconciliation discharge process and included on the discharge medication reconciliation report.    Thank you,  Roderick Wellington, [facility]    "

## 2024-12-20 ENCOUNTER — APPOINTMENT (OUTPATIENT)
Dept: SPEECH THERAPY | Facility: CLINIC | Age: 79
End: 2024-12-20
Attending: STUDENT IN AN ORGANIZED HEALTH CARE EDUCATION/TRAINING PROGRAM
Payer: MEDICARE

## 2024-12-20 LAB
ANION GAP SERPL CALCULATED.3IONS-SCNC: 6 MMOL/L (ref 7–15)
BACTERIA UR CULT: ABNORMAL
BASE EXCESS BLDV CALC-SCNC: 13.8 MMOL/L (ref -3–3)
BUN SERPL-MCNC: 7 MG/DL (ref 8–23)
CALCIUM SERPL-MCNC: 9 MG/DL (ref 8.8–10.4)
CHLORIDE SERPL-SCNC: 96 MMOL/L (ref 98–107)
CREAT SERPL-MCNC: 0.49 MG/DL (ref 0.51–0.95)
EGFRCR SERPLBLD CKD-EPI 2021: >90 ML/MIN/1.73M2
ERYTHROCYTE [DISTWIDTH] IN BLOOD BY AUTOMATED COUNT: 16.5 % (ref 10–15)
GLUCOSE SERPL-MCNC: 87 MG/DL (ref 70–99)
HCO3 BLDV-SCNC: 42 MMOL/L (ref 21–28)
HCO3 SERPL-SCNC: 38 MMOL/L (ref 22–29)
HCT VFR BLD AUTO: 31 % (ref 35–47)
HGB BLD-MCNC: 9.3 G/DL (ref 11.7–15.7)
MCH RBC QN AUTO: 25.5 PG (ref 26.5–33)
MCHC RBC AUTO-ENTMCNC: 30 G/DL (ref 31.5–36.5)
MCV RBC AUTO: 85 FL (ref 78–100)
O2/TOTAL GAS SETTING VFR VENT: 4 %
OXYHGB MFR BLDV: 48 % (ref 70–75)
PCO2 BLDV: 74 MM HG (ref 40–50)
PH BLDV: 7.36 [PH] (ref 7.32–7.43)
PLATELET # BLD AUTO: 259 10E3/UL (ref 150–450)
PO2 BLDV: 32 MM HG (ref 25–47)
POTASSIUM SERPL-SCNC: 3.2 MMOL/L (ref 3.4–5.3)
RBC # BLD AUTO: 3.65 10E6/UL (ref 3.8–5.2)
SAO2 % BLDV: 49.6 % (ref 70–75)
SODIUM SERPL-SCNC: 140 MMOL/L (ref 135–145)
WBC # BLD AUTO: 8.7 10E3/UL (ref 4–11)

## 2024-12-20 PROCEDURE — 92610 EVALUATE SWALLOWING FUNCTION: CPT | Mod: GN | Performed by: SPEECH-LANGUAGE PATHOLOGIST

## 2024-12-20 PROCEDURE — 36415 COLL VENOUS BLD VENIPUNCTURE: CPT | Performed by: STUDENT IN AN ORGANIZED HEALTH CARE EDUCATION/TRAINING PROGRAM

## 2024-12-20 PROCEDURE — 82805 BLOOD GASES W/O2 SATURATION: CPT | Performed by: STUDENT IN AN ORGANIZED HEALTH CARE EDUCATION/TRAINING PROGRAM

## 2024-12-20 PROCEDURE — 80048 BASIC METABOLIC PNL TOTAL CA: CPT | Performed by: STUDENT IN AN ORGANIZED HEALTH CARE EDUCATION/TRAINING PROGRAM

## 2024-12-20 PROCEDURE — 92526 ORAL FUNCTION THERAPY: CPT | Mod: GN | Performed by: SPEECH-LANGUAGE PATHOLOGIST

## 2024-12-20 PROCEDURE — 250N000013 HC RX MED GY IP 250 OP 250 PS 637: Performed by: INTERNAL MEDICINE

## 2024-12-20 PROCEDURE — 120N000002 HC R&B MED SURG/OB UMMC

## 2024-12-20 PROCEDURE — 82310 ASSAY OF CALCIUM: CPT | Performed by: STUDENT IN AN ORGANIZED HEALTH CARE EDUCATION/TRAINING PROGRAM

## 2024-12-20 PROCEDURE — 85018 HEMOGLOBIN: CPT | Performed by: STUDENT IN AN ORGANIZED HEALTH CARE EDUCATION/TRAINING PROGRAM

## 2024-12-20 PROCEDURE — 250N000011 HC RX IP 250 OP 636: Performed by: INTERNAL MEDICINE

## 2024-12-20 PROCEDURE — 99233 SBSQ HOSP IP/OBS HIGH 50: CPT | Performed by: STUDENT IN AN ORGANIZED HEALTH CARE EDUCATION/TRAINING PROGRAM

## 2024-12-20 PROCEDURE — 250N000013 HC RX MED GY IP 250 OP 250 PS 637: Performed by: STUDENT IN AN ORGANIZED HEALTH CARE EDUCATION/TRAINING PROGRAM

## 2024-12-20 RX ORDER — PREGABALIN 50 MG/1
50 CAPSULE ORAL 2 TIMES DAILY
Status: DISCONTINUED | OUTPATIENT
Start: 2024-12-21 | End: 2024-12-26 | Stop reason: HOSPADM

## 2024-12-20 RX ORDER — TRAZODONE HYDROCHLORIDE 50 MG/1
100 TABLET, FILM COATED ORAL AT BEDTIME
Status: DISCONTINUED | OUTPATIENT
Start: 2024-12-20 | End: 2024-12-26 | Stop reason: HOSPADM

## 2024-12-20 RX ORDER — MIRTAZAPINE 15 MG/1
15 TABLET, FILM COATED ORAL AT BEDTIME
Status: DISCONTINUED | OUTPATIENT
Start: 2024-12-20 | End: 2024-12-26 | Stop reason: HOSPADM

## 2024-12-20 RX ADMIN — FOLIC ACID 1000 MCG: 1 TABLET ORAL at 10:13

## 2024-12-20 RX ADMIN — DICYCLOMINE HYDROCHLORIDE 10 MG: 10 CAPSULE ORAL at 16:25

## 2024-12-20 RX ADMIN — TRAZODONE HYDROCHLORIDE 100 MG: 50 TABLET ORAL at 21:21

## 2024-12-20 RX ADMIN — PANTOPRAZOLE SODIUM 40 MG: 40 TABLET, DELAYED RELEASE ORAL at 10:13

## 2024-12-20 RX ADMIN — DOXYCYCLINE HYCLATE 100 MG: 100 CAPSULE ORAL at 21:21

## 2024-12-20 RX ADMIN — ASPIRIN 81 MG: 81 TABLET, COATED ORAL at 10:13

## 2024-12-20 RX ADMIN — ATORVASTATIN CALCIUM 40 MG: 40 TABLET, FILM COATED ORAL at 10:13

## 2024-12-20 RX ADMIN — Medication 12.5 MG: at 10:13

## 2024-12-20 RX ADMIN — Medication 12.5 MG: at 21:21

## 2024-12-20 RX ADMIN — FERROUS SULFATE TAB 325 MG (65 MG ELEMENTAL FE) 325 MG: 325 (65 FE) TAB at 10:13

## 2024-12-20 RX ADMIN — PANTOPRAZOLE SODIUM 40 MG: 40 TABLET, DELAYED RELEASE ORAL at 16:25

## 2024-12-20 RX ADMIN — CETIRIZINE HYDROCHLORIDE 10 MG: 10 TABLET, FILM COATED ORAL at 16:25

## 2024-12-20 RX ADMIN — CEFTRIAXONE SODIUM 1 G: 1 INJECTION, POWDER, FOR SOLUTION INTRAMUSCULAR; INTRAVENOUS at 22:40

## 2024-12-20 RX ADMIN — DICYCLOMINE HYDROCHLORIDE 10 MG: 10 CAPSULE ORAL at 10:13

## 2024-12-20 RX ADMIN — DICYCLOMINE HYDROCHLORIDE 10 MG: 10 CAPSULE ORAL at 21:21

## 2024-12-20 RX ADMIN — MIRTAZAPINE 15 MG: 15 TABLET, FILM COATED ORAL at 21:21

## 2024-12-20 RX ADMIN — MAGNESIUM OXIDE TAB 400 MG (241.3 MG ELEMENTAL MG) 400 MG: 400 (241.3 MG) TAB at 10:13

## 2024-12-20 RX ADMIN — CYANOCOBALAMIN TAB 500 MCG 500 MCG: 500 TAB at 16:25

## 2024-12-20 RX ADMIN — DOXYCYCLINE HYCLATE 100 MG: 100 CAPSULE ORAL at 10:13

## 2024-12-20 ASSESSMENT — ACTIVITIES OF DAILY LIVING (ADL)
ADLS_ACUITY_SCORE: 55
ADLS_ACUITY_SCORE: 51
ADLS_ACUITY_SCORE: 51
ADLS_ACUITY_SCORE: 55
ADLS_ACUITY_SCORE: 51
ADLS_ACUITY_SCORE: 55
ADLS_ACUITY_SCORE: 51
ADLS_ACUITY_SCORE: 55
ADLS_ACUITY_SCORE: 51
ADLS_ACUITY_SCORE: 55
ADLS_ACUITY_SCORE: 51

## 2024-12-20 NOTE — PROGRESS NOTES
"CLINICAL NUTRITION SERVICES - ASSESSMENT NOTE     Nutrition Prescription    RECOMMENDATIONS FOR MDs/PROVIDERS TO ORDER:  -none at present.     Malnutrition Status:    Unable to determine due to limited data    Recommendations already ordered by Registered Dietitian (RD):  - trial Ensure Enlive BID w/ meals.  - add multivit w/ minerals for wound healing.    Future/Additional Recommendations:  - See as able for po and weight hx, NPFE to finalize malnutrition assessment     REASON FOR ASSESSMENT  Vandana Ivey is a/an 79 year old female assessed by the dietitian for malnutrition screening tool--wt loss \"unsure\", no points for decreased po intake.     PMH  pulmonary fibrosis with chronic home O22.5 Liters baseline history of meningioma status post resection, hiatal hernia s/p repair, IBS-C, benign paroxysmal positional vertigo, meningioma (H) s/p resection, osteoporosis. Also mention of gastroparesis.    NUTRITION HISTORY  Unable to speak w/ pt but per chart, noted general decline. Per chart, receives Meals on Wheels.     CURRENT NUTRITION ORDERS  Diet: Regular (as recommended by SLP)  Intake/Tolerance: No intake documented in flowsheet.     LABS  Labs reviewed   Latest Reference Range & Units 12/18/24 21:09 12/19/24 00:36 12/20/24 08:38   Sodium 135 - 145 mmol/L 141  140   Potassium 3.4 - 5.3 mmol/L 3.7  3.2 (L)   Urea Nitrogen 8.0 - 23.0 mg/dL 8.4  7.0 (L)   Creatinine 0.51 - 0.95 mg/dL 0.52  0.49 (L)   Anion Gap 7 - 15 mmol/L 10  6 (L)   Glucose 70 - 99 mg/dL 134 (H)  87   Lactic Acid 0.7 - 2.0 mmol/L  1.8    (L): Data is abnormally low  (H): Data is abnormally high    MEDICATIONS  Medications reviewed    ANTHROPOMETRICS  Height: 152.4 cm (5' 0\")  Most Recent Weight: 41.7 kg (91 lb 14.9 oz)    IBW: 45.5 kg (pt @ 91.6% IBW)  BMI: Underweight BMI <18.5   Weight History:   Wt Readings from Last 20 Encounters:   12/19/24 41.7 kg (91 lb 14.9 oz)  bed scale   11/19/24 43.5 kg (96 lb)   10/03/24 43.5 kg (96 lb)   10/02/24 " "43.5 kg (96 lb)   09/30/24 43.5 kg (96 lb)   08/22/24 43.6 kg (96 lb 3.2 oz)     Weight assessment:   RN charted and notified provider 12/19 \"With loss of weight last year, 16 lbs weight loss.\" No weights available from last year to verify.  - Weights after 8/22/24 appear all stated as \"96 lb\" PTA    - Wt loss of 4.4% in 1 month vs 4 months (original wt)--doesn't meet malnutrition criteria.    Dosing Weight: 41.7 kg    ASSESSED NUTRITION NEEDS  Estimated Energy Needs: 1251 - 1460 kcals/day (30 - 35 kcals/kg )  Justification: Increased needs w/ respiratory disease and Underweight  Estimated Protein Needs: 50 - 63  grams protein/day (1.2 -1.5 g/kg)  Justification: Hypercatabolism with acute illness, Repletion, and Wound healing  Estimated Fluid Needs: 1043 - 1251 mL/day (25 - 30 mL/kg)   Justification: Once rehydrated-Maintenance or per provider and fluid status    PHYSICAL FINDINGS  See malnutrition section below.  Pressure injury--stage 2 sacrum per flowsheet  Britton score 18, nutrition score 3--adequate    MALNUTRITION  % Intake: Unable to assess  % Weight Loss: Unable to assess--unable to verify reports of further wt loss  Subcutaneous Fat Loss: Unable to assess  Muscle Loss: Unable to assess  Fluid Accumulation/Edema: Unable to assess  Malnutrition Diagnosis: Unable to determine due to limited data    NUTRITION DIAGNOSIS  Increased nutrient needs for calories, protein and micronutrients related to pulmonary fibrosis, underweight and PI as evidenced by estimated needs and reported wt loss.      INTERVENTIONS  Implementation  Medical food supplement therapy  Multivitamin/mineral supplement therapy     Goals  Patient to consume % of nutritionally adequate meal trays TID, or the equivalent with supplements/snacks.     Monitoring/Evaluation  Progress toward goals will be monitored and evaluated per protocol.  Shannan Grossman RD, LD   6 & 8 Med/Surg RD  Mon-Fri Vocera: \"6 Med Surg Clinical Dietitian\" or \"8 Med " "Surg Clinical Dietitian\"  Weekend RD Jorge (Global): \"Weekend Holiday Clinical Dietitian\"        "

## 2024-12-20 NOTE — PROGRESS NOTES
[facility]    Medicine Progress Note - Hospitalist Service, GOLD TEAM 21    Date of Admission:  12/18/2024     Assessment & Plan   Vandana Ivey is a 79 year old female with history most notable for pulmonary fibrosis with chronic home O2 of 2.5 L, meningioma status postresection, hiatal hernia s/p repair, IBS, chronic dizziness, osteoporosis who presented from home with progressive dizziness and shortness of breath admitted to [facility] for sepsis.  Workup notable for parainfluenza infection on respiratory viral panel as well as E. coli urinary tract infection and findings concerning for bacterial pneumonia.  Patient transferred from [facility] to [facility] on 12/19.    #Sepsis of multifactorial cause  #E. coli urinary tract infection  #Community-acquired pneumonia  #Parainfluenza infection  Patient presented with progressive shortness of breath with vague but progressive dizziness.  Lactic acid within normal limits.  UA consistent with infectious with subsequent culture growing greater than 100,000 E. coli.  Reports significant urinary frequency prior to admission.  Patient has shown clinical improvement since the start of antibiotics.  Urine culture susceptible to third-generation beta-lactam's.  - Continue ceftriaxone and doxycycline  -Follow-up cultures    #Acute on chronic hypoxic hypercapnic respiratory failure  #Pulmonary fibrosis  Baseline oxygen need of 2 to 3 L at home.  Initial VBG with significant CO2 retention of chronic pattern.  Unclear baseline as no prior gas in system.  Follows with pulmonary-last clinic date 11/19 still under workup for ILD.  -Echo-remarkable for severe left atrial enlargement, mild pulmonary hypertension LV function and global right ventricular function being normal with mild enlargement  -Wean oxygen to baseline as able  -Will have low threshold to consult from pulmonology if respiratory issues    #Goals of  "care  Per admitting provider-\"Patient reports she wants to think about it if she wants to be full code or DNR.  Will order full code for now.  Given pulmonary fibrosis and progressive hypercapnia and hypoxia.  I am worried that she might require intubation during this hospitalization.  Patient is also more keen towards going home than any prolonged hospitalization or rehab.  Daughter is aware and understands. Patient is currently able to take her own medication at home and lives independently, gets Meals on Wheels \" up-to-date on 12/20-I had a long discussion with daughter over the phone (see additional note on same day).  She later discussed with patient and ultimate decision was made by patient for CODE STATUS to be changed to DNR/DNI.    #Polypharmacy  Reviewed for med rec, will restart Lyrica Remeron and trazodone    Chronic Conditions:     Osteoporosis without current pathological fracture, unspecified osteoporosis type  Hiatal hernia  Gastroesophageal reflux disease   Dizziness  Benign paroxysmal positional vertigo, unspecified laterality  Meningioma (H) s/p resection.   Insomnia,   Irritable bowel syndrome with constipation  Gastroparesis        Diet: Regular Diet Adult    DVT Prophylaxis: Pneumatic Compression Devices  Bruns Catheter: Not present  Lines: None       Cardiac Monitoring: None  Code Status: Full Code      Clinically Significant Risk Factors          # Hypochloremia: Lowest Cl = 92 mmol/L in last 2 days, will monitor as appropriate                             # Cachexia: Estimated body mass index is 17.95 kg/m  as calculated from the following:    Height as of this encounter: 1.524 m (5').    Weight as of this encounter: 41.7 kg (91 lb 14.9 oz)., PRESENT ON ADMISSION                Social Drivers of Health            Disposition Plan     Medically Ready for Discharge: Anticipated in 2-4 Days         Damien Maldonado DO, MPH  Internal Medicine-Pediatrics Hospitalist  Hospitalist Service, GOLD TEAM " 21  [facility]  Securely message with [employer] (more info)  Text page via [employer] Paging/Directory   See signed in provider for up to date coverage information  ______________________________________________________________________    Interval History   Nursing and any cross cover notes reviewed.  No acute events overnight.    Patient seen at bedside this AM.  Patient reports feeling much better than the day of admission.  Breathing feels better.  Continues to have some urinary frequency but has improved.    Physical Exam   Vital Signs: Temp: 97.3  F (36.3  C) Temp src: Oral BP: 108/49 Pulse: 71   Resp: 18 SpO2: 97 % O2 Device: Nasal cannula Oxygen Delivery: 3 LPM  Weight: 91 lbs 14.91 oz    No apparent distress, awake, lungs clear to auscultation anteriorly although limited by shallow breaths, belly soft and nontender, no peripheral edema    Medical Decision Making           Data         Imaging results reviewed over the past 24 hrs:   Recent Results (from the past 24 hours)   Echo Complete   Result Value    LVEF  60-65%    Narrative    [id number]  [id number]  [id number]  [id number]^DINORA^NARGIS^MICHAEL     [facility],[place]  Echocardiography Laboratory  [address] [place] [id number]     Name: KERLINE TAMAYO  MRN: [id number]  : 1945  Study Date: 2024 08:48 AM  Age: 79 yrs  Gender: Female  Patient Location: [facility]  Reason For Study: Pulmonary Hypertension  Ordering Physician: NARGIS FARIAS  Performed By: Mckenna Flores RDCS     BSA: 1.4 m2  Height: 61 in  Weight: 96 lb  HR: 72  BP: 113/93 mmHg  ______________________________________________________________________________  Procedure  Echocardiogram with two-dimensional, color and spectral Doppler.  ______________________________________________________________________________  Interpretation Summary  Left ventricular size, wall motion and function are normal. The ejection  fraction  is 60-65%.  Mild right ventricular dilation. Global right ventricular function is normal.  Severe left atrial enlargement.  Mild pulmonary hypertension. Estimated pulmonary artery systolic pressure is  45 mmHg.  The inferior vena cava was normal in size with preserved respiratory  variability.  No pericardial effusion.  There is no prior study for direct comparison.  ______________________________________________________________________________  Left Ventricle  Left ventricular size, wall motion and function are normal. The ejection  fraction is 60-65%. Grade II or moderate diastolic dysfunction.     Right Ventricle  Global right ventricular function is normal. Mild right ventricular dilation  is present.     Atria  Severe left atrial enlargement is present. Moderate right atrial enlargement  is present.     Mitral Valve  The mitral valve is normal. Mild mitral insufficiency is present.     Aortic Valve  Trileaflet aortic sclerosis without stenosis.     Tricuspid Valve  The tricuspid valve is normal. Mild tricuspid insufficiency is present.  Estimated pulmonary artery systolic pressure is 42 mmHg plus right atrial  pressure. Mild pulmonary hypertension is present.     Pulmonic Valve  The pulmonic valve is normal. Trace pulmonic insufficiency is present.     Vessels  Normal diameter aortic root and proximal ascending aorta. The inferior vena  cava was normal in size with preserved respiratory variability.     Pericardium  No pericardial effusion is present.     Compared to Previous Study  There is no prior study for direct comparison.  ______________________________________________________________________________  MMode/2D Measurements & Calculations     IVSd: 1.0 cm  LVIDd: 3.7 cm  LVIDs: 2.7 cm  LVPWd: 1.0 cm  FS: 28.1 %  LV mass(C)d: 112.5 grams  LV mass(C)dI: 81.4 grams/m2  Ao root diam: 3.1 cm  asc Aorta Diam: 3.1 cm  LVOT diam: 2.1 cm  LVOT area: 3.3 cm2  Ao root diam index Ht(cm/m): 2.0  Ao root diam index BSA  (cm/m2): 2.3  Asc Ao diam index BSA (cm/m2): 2.3  Asc Ao diam index Ht(cm/m): 2.0  RV Base: 4.4 cm  RWT: 0.54  TAPSE: 2.4 cm     Doppler Measurements & Calculations  MV E max merritt: 75.5 cm/sec  MV A max merritt: 85.7 cm/sec  MV E/A: 0.88  MV dec time: 0.22 sec  PA acc time: 0.12 sec  TR max merritt: 322.6 cm/sec  TR max P.6 mmHg  E/E' av.7  Lateral E/e': 11.5  Medial E/e': 13.9  RV S Merritt: 12.7 cm/sec     ______________________________________________________________________________  Report approved by: Kishore Castillo MD on 2024 10:50 AM

## 2024-12-20 NOTE — PROGRESS NOTES
"[facility]    Medicine Progress Note - Hospitalist Service, GOLD TEAM 21    Date of Admission:  12/18/2024     Assessment & Plan   Vandana Ivey is a 79 year old female with history most notable for pulmonary fibrosis with chronic home O2 of 2.5 L, meningioma status postresection, hiatal hernia s/p repair, IBS, chronic dizziness, osteoporosis who presented from home with progressive dizziness and shortness of breath admitted to [facility] for sepsis.  Workup notable for parainfluenza infection on respiratory viral panel as well as E. coli urinary tract infection and findings concerning for bacterial pneumonia.  Patient transferred from [facility] 12/19.    #Sepsis of multifactorial cause  #E. coli urinary tract infection  #Community-acquired pneumonia  #Parainfluenza infection  Patient presented with progressive shortness of breath with vague but progressive dizziness.  Lactic acid within normal limits.  UA consistent with infectious with subsequent culture growing greater than 100,000 E. coli.  Reports significant urinary frequency prior to admission.  - Continue ceftriaxone and doxycycline  -Follow-up cultures    #Acute on chronic hypoxic hypercapnic respiratory failure  #Pulmonary fibrosis  Baseline oxygen need of 2 to 3 L at home.  Initial VBG with significant CO2 retention of chronic pattern.  Unclear baseline as no prior gas in system.  Follows with pulmonary-last clinic date 11/19 still under workup for ILD.  -Echo-remarkable for severe left atrial enlargement, mild pulmonary hypertension LV function and global right ventricular function being normal with mild enlargement  -Wean oxygen to baseline as able    #Goals of care  Per admitting provider-\"Patient reports she wants to think about it if she wants to be full code or DNR.  Will order full code for now.  Given pulmonary fibrosis and progressive hypercapnia and hypoxia.  I am " "worried that she might require intubation during this hospitalization.  Patient is also more keen towards going home than any prolonged hospitalization or rehab.  Daughter is aware and understands. Patient is currently able to take her own medication at home and lives independently, gets Meals on Wheels \"  -Will discuss further on 12/20 with daughter and patient    #Polypharmacy  Will continue to hold Ativan, Lyrica, Remeron, trazodone  Pharmacy med rec consult placed    Chronic Conditions:     Osteoporosis without current pathological fracture, unspecified osteoporosis type  Hiatal hernia  Gastroesophageal reflux disease   Dizziness  Benign paroxysmal positional vertigo, unspecified laterality  Meningioma (H) s/p resection.   Insomnia,   Irritable bowel syndrome with constipation  Gastroparesis            Diet: Regular Diet Adult    DVT Prophylaxis: Pneumatic Compression Devices  Burns Catheter: Not present  Lines: None       Cardiac Monitoring: None  Code Status: Full Code      Clinically Significant Risk Factors Present on Admission          # Hypochloremia: Lowest Cl = 92 mmol/L in last 2 days, will monitor as appropriate          # Drug Induced Platelet Defect: home medication list includes an antiplatelet medication          # Anemia: based on hgb <11           # Cachexia: Estimated body mass index is 17.95 kg/m  as calculated from the following:    Height as of this encounter: 1.524 m (5').    Weight as of this encounter: 41.7 kg (91 lb 14.9 oz).                  Social Drivers of Health            Disposition Plan     Medically Ready for Discharge: Anticipated in 2-4 Days         Damien Maldonado DO, MPH  Internal Medicine-Pediatrics Hospitalist  Hospitalist Service, GOLD TEAM [address] [place] [address]  Securely message with [employer] (more info)  Text page via [facility] Paging/Directory   See signed in provider for up to date coverage " information  ______________________________________________________________________    Interval History   Nursing and any cross cover notes reviewed.  Transferred without issue from [facility].    Patient seen at bedside this AM.  Patient reports feeling much better than the day of admission.  Breathing feels better.  Reports that she had significant urinary frequency prior to admission.    Physical Exam   Vital Signs: Temp: 97.6  F (36.4  C) Temp src: Oral BP: 125/64 Pulse: 78   Resp: 18 SpO2: 90 % O2 Device: Nasal cannula with humidification Oxygen Delivery: 3 LPM  Weight: 91 lbs 14.91 oz    No apparent distress, awake, lungs clear to auscultation anteriorly although limited by shallow breaths, belly soft and nontender, no peripheral edema    Medical Decision Making           Data     I have personally reviewed the following data over the past 24 hrs:    11.9 (H)  \   10.1 (L)   / 257     141 92 (L) 8.4 /  134 (H)   3.7 39 (H) 0.52 \     Trop: 14 BNP: N/A     Procal: N/A CRP: N/A Lactic Acid: 1.8       INR:  0.98 PTT:  N/A   D-dimer:  N/A Fibrinogen:  N/A       Imaging results reviewed over the past 24 hrs:   Recent Results (from the past 24 hours)   XR Chest 2 Views    Narrative    EXAM: XR CHEST 2 VIEWS  LOCATION: [facility]  DATE: 2024    INDICATION: SOB, dizziness, chest pain  COMPARISON: 2024      Impression    IMPRESSION: Cardiac silhouette is enlarged. Severe fibrotic changes throughout the lungs with interstitial thickening and airspace consolidation at the left base which could reflect pneumonia and/or pleural effusion. No visible pneumothorax.   Echo Complete   Result Value    LVEF  60-65%    Narrative    [id number]  [id number]  [id number]  [id number]^ZEWDE^NARGIS^G     [facility],[place]  Echocardiography Laboratory  [address] [place] [id number]     Name: KERLINE TAMAYO  MRN: [id number]  :  1945  Study Date: 12/19/2024 08:48 AM  Age: 79 yrs  Gender: Female  Patient [facility]: [facility]  Reason For Study: Pulmonary Hypertension  Ordering Physician: NARGIS FARIAS  Performed By: Mckenna Folres CHINEDU     BSA: 1.4 m2  Height: 61 in  Weight: 96 lb  HR: 72  BP: 113/93 mmHg  ______________________________________________________________________________  Procedure  Echocardiogram with two-dimensional, color and spectral Doppler.  ______________________________________________________________________________  Interpretation Summary  Left ventricular size, wall motion and function are normal. The ejection  fraction is 60-65%.  Mild right ventricular dilation. Global right ventricular function is normal.  Severe left atrial enlargement.  Mild pulmonary hypertension. Estimated pulmonary artery systolic pressure is  45 mmHg.  The inferior vena cava was normal in size with preserved respiratory  variability.  No pericardial effusion.  There is no prior study for direct comparison.  ______________________________________________________________________________  Left Ventricle  Left ventricular size, wall motion and function are normal. The ejection  fraction is 60-65%. Grade II or moderate diastolic dysfunction.     Right Ventricle  Global right ventricular function is normal. Mild right ventricular dilation  is present.     Atria  Severe left atrial enlargement is present. Moderate right atrial enlargement  is present.     Mitral Valve  The mitral valve is normal. Mild mitral insufficiency is present.     Aortic Valve  Trileaflet aortic sclerosis without stenosis.     Tricuspid Valve  The tricuspid valve is normal. Mild tricuspid insufficiency is present.  Estimated pulmonary artery systolic pressure is 42 mmHg plus right atrial  pressure. Mild pulmonary hypertension is present.     Pulmonic Valve  The pulmonic valve is normal. Trace pulmonic insufficiency is present.     Vessels  Normal diameter aortic root and  proximal ascending aorta. The inferior vena  cava was normal in size with preserved respiratory variability.     Pericardium  No pericardial effusion is present.     Compared to Previous Study  There is no prior study for direct comparison.  ______________________________________________________________________________  MMode/2D Measurements & Calculations     IVSd: 1.0 cm  LVIDd: 3.7 cm  LVIDs: 2.7 cm  LVPWd: 1.0 cm  FS: 28.1 %  LV mass(C)d: 112.5 grams  LV mass(C)dI: 81.4 grams/m2  Ao root diam: 3.1 cm  asc Aorta Diam: 3.1 cm  LVOT diam: 2.1 cm  LVOT area: 3.3 cm2  Ao root diam index Ht(cm/m): 2.0  Ao root diam index BSA (cm/m2): 2.3  Asc Ao diam index BSA (cm/m2): 2.3  Asc Ao diam index Ht(cm/m): 2.0  RV Base: 4.4 cm  RWT: 0.54  TAPSE: 2.4 cm     Doppler Measurements & Calculations  MV E max merritt: 75.5 cm/sec  MV A max merritt: 85.7 cm/sec  MV E/A: 0.88  MV dec time: 0.22 sec  PA acc time: 0.12 sec  TR max merritt: 322.6 cm/sec  TR max P.6 mmHg  E/E' av.7  Lateral E/e': 11.5  Medial E/e': 13.9  RV S Merritt: 12.7 cm/sec     ______________________________________________________________________________  Report approved by: Kishore Castillo MD on 2024 10:50 AM

## 2024-12-20 NOTE — PROGRESS NOTES
"   12/20/24 9774   Appointment Info   Signing Clinician's Name / Credentials (SLP) Destin Hood MA [facility] SLP   General Information   Onset of Illness/Injury or Date of Surgery 12/18/24   Referring Physician Damien Maldonado DO   Pertinent History of Current Problem Per Provider Notes: \"Vandana Ivey is a 79 year old female with history most notable for pulmonary fibrosis with chronic home O2 of 2.5 L, meningioma status postresection, hiatal hernia s/p repair, IBS, chronic dizziness, osteoporosis who presented from home with progressive dizziness and shortness of breath admitted to [facility] for sepsis.  Workup notable for parainfluenza infection on respiratory viral panel as well as E. coli urinary tract infection and findings concerning for bacterial pneumonia.  Patient transferred from [facility] to [facility] on 12/19.\"   General Observations Patient upright in bed, cooperative throughout. Daughter and son-in-law arriving at end of evaluation.   Pain Assessment   Patient Currently in Pain No   Type of Evaluation   Type of Evaluation Swallow Evaluation   Oral Motor   Oral Musculature generally intact   Structural Abnormalities none present   Mucosal Quality adequate   Dentition (Oral Motor)   Dentition (Oral Motor) edentulous;other (see comments)  (wears dentures, but not in hospital. Reports she does not usually wear dentures when eating.)   Facial Symmetry (Oral Motor)   Facial Symmetry (Oral Motor) WNL   Lip Function (Oral Motor)   Lip Range of Motion (Oral Motor) WNL   Tongue Function (Oral Motor)   Tongue Coordination/Speed (Oral Motor) reduced rate   Tongue ROM (Oral Motor) WNL   Jaw Function (Oral Motor)   Jaw Function (Oral Motor) WNL   Cough/Swallow/Gag Reflex (Oral Motor)   Volitional Throat Clear/Cough (Oral Motor) reduced strength   Volitional Swallow (Oral Motor) WNL   Vocal Quality/Secretion Management (Oral Motor)   Vocal Quality (Oral Motor) WNL   Secretion Management (Oral Motor) " WNL   General Swallowing Observations   Past History of Dysphagia None prior per patient, familiy, or chart review   Respiratory Support nasal cannula;other (see comments)  (4L)   Current Diet/Method of Nutritional Intake (General Swallowing Observations, NIS) regular diet;thin liquids (level 0)   Swallowing Evaluation Clinical swallow evaluation   Clinical Swallow Evaluation   Feeding Assistance no assistance needed   Clinical Swallow Evaluation Textures Trialed thin liquids;pureed;soft & bite-sized;solid foods   Clinical Swallow Eval: Thin Liquid Texture Trial   Mode of Presentation, Thin Liquids cup;straw;self-fed   Volume of Liquid or Food Presented >4 oz via single and consecutive sips   Oral Phase of Swallow WFL   Pharyngeal Phase of Swallow intact   Diagnostic Statement No overt s/s of aspiration observed.   Clinical Swallow Evaluation: Puree Solid Texture Trial   Mode of Presentation, Puree self-fed   Volume of Puree Presented x3 tsps   Oral Phase, Puree WFL   Pharyngeal Phase, Puree intact   Diagnostic Statement No overt s/s of aspiration observed   Clinical Swallow Eval: Soft & Bite Sized   Mode of Presentation self-fed   Volume Presented x2 trials   Oral Phase WFL   Pharyngeal Phase intact   Diagnostic Statement No overt s/s of aspiration   Clinical Swallow Evaluation: Solid Food Texture Trial   Mode of Presentation self-fed   Volume Presented x1 bite daniel cracker   Oral Phase impaired mastication   Pharyngeal Phase intact   Diagnostic Statement No overt s/s of aspiration. Required liquid wash, extended mastication time.   Esophageal Phase of Swallow   Patient reports or presents with symptoms of esophageal dysphagia Yes   Esophageal comments Hx of GERD, noted concerns about reflux   Swallowing Recommendations   Diet Consistency Recommendations thin liquids (level 0);regular diet   Supervision Level for Intake distant supervision needed   Mode of Delivery Recommendations bolus size, small;slow rate of  intake   Swallowing Maneuver Recommendations alternate food and liquid intake   Monitoring/Assistance Required (Eating/Swallowing) stop eating activities when fatigue is present;monitor for cough or change in vocal quality with intake   Recommended Feeding/Eating Techniques (Swallow Eval) maintain upright posture during/after eating for 30 minutes;minimize distractions during oral intake;set-up and prepare tray   Medication Administration Recommendations, Swallowing (SLP) As tolerated   Instrumental Assessment Recommendations   (Pending Progress)   General Therapy Interventions   Planned Therapy Interventions Dysphagia Treatment   Dysphagia treatment Modified diet education;Instruction of safe swallow strategies   Clinical Impression   Criteria for Skilled Therapeutic Interventions Met (SLP Eval) Yes, treatment indicated   SLP Diagnosis Mild Oral Dysphagia   Risks & Benefits of therapy have been explained evaluation/treatment results reviewed;care plan/treatment goals reviewed;risks/benefits reviewed;current/potential barriers reviewed;participants voiced agreement with care plan;participants included;patient;daughter   Clinical Impression Comments   CSE completed per provider orders. Patient presents with mild oral dysphagia in setting of sepsis with UTI. Patient also with increased O2 needs upon hospitalization. Patient reports no hx of dysphagia, but notes hx of reflux for which she has concerns. She does have hx of aphasia, but no noted difficulties with word-finding or comprehension noted during evaluation - at times, slow to respond, but no noted paraphasias observed. Oral motor evaluation overall unremarkable, but she did have reduced cough strength. She also did not have dentures in place for evaluation, but reports she does not usually wear them when eating (confirmed by family). PO trials of thin liquids (straw, cup), puree textures, soft & bite size solids, and hard solids completed. Mastication of hard  solids extended and effortful, requiring liquid wash. No overt s/s of aspiration observed across all trials. Patient does report some intermittent challenges with reflux, though does not consistently follow reflux precautions when receiving training about strategies for managing symptoms (reports laying down to sleep almost immediately after eating during the day).     Recommend continue current regular diet and thin liquids with patient self-selecting softer solids. Patient must be fully upright and alert for all intake. If mentation altered, recommend holding PO until resolved. Patient should take small bites/sips, use a slow rate of intake, and alerternate liquids and solids. Patient should remain upright after meals for at least 30-60 minutes. Could benefit from review of medications for managing GERD or GI consult if noticing worsening concerns. SLP will follow for brief course of dysphagia tx.     SLP Total Evaluation Time   Eval: oral/pharyngeal swallow function, clinical swallow Minutes (94337) 20   SLP Discharge Planning   SLP Discharge Recommendation home with assist   SLP Rationale for DC Rec Suspect all swallow goals will be met, but may need cues/reminders to follow reflux strategies.

## 2024-12-20 NOTE — PHARMACY-ADMISSION MEDICATION HISTORY
Pharmacist Admission Medication History    Admission medication history is complete. The information provided in this note is only as accurate as the sources available at the time of the update.    Information Source(s): Patient and CareEverywhere/SureScripts via in-person and phone    Pertinent Information:   Patient took all her scheduled prescription medications a couple of days ago at home.   Patient does not take vit B12, Ferrous sulfate, Folic acid, Magnesium, Vit B1 and Vit E regularly. She could not recall when were her last doses.   Creon was stopped by GI.    Changes made to PTA medication list:  Added: None  Deleted: None  Changed: None    Allergies reviewed with patient and updates made in EHR: yes    Medication History Completed By: [facility] 12/19/2024 6:47 PM    PTA Med List   Medication Sig Last Dose/Taking    alendronate (FOSAMAX) 70 MG tablet Take 1 tablet (70 mg) by mouth every 7 days on Sat. 12/14/2024    aspirin 81 MG EC tablet Take 81 mg by mouth daily. Past Week    atorvastatin (LIPITOR) 40 MG tablet Take 1 tablet (40 mg) by mouth daily at 2 pm. Past Week    cetirizine (ZYRTEC) 10 MG tablet Take 1 tablet by mouth daily at 2 pm. Past Week    cyanocobalamin (VITAMIN B-12) 500 MCG tablet Take 1 tablet (500 mcg) by mouth daily. Unknown    dicyclomine (BENTYL) 10 MG capsule Take 1 capsule (10 mg) by mouth 3 times daily. Past Week    famotidine (PEPCID) 20 MG tablet Take 20 mg by mouth 2 times daily as needed (gastric reflux or symptoms). Taking As Needed    FEROSUL 325 (65 Fe) MG tablet Take 1 tablet by mouth daily at 2 pm. Unknown    folic acid (FOLVITE) 1 MG tablet Take 1 tablet by mouth daily at 2 pm. Unknown    magnesium 250 MG tablet Take 1 tablet by mouth daily. Unknown    meclizine (ANTIVERT) 12.5 MG tablet Take 1 tablet (12.5 mg) by mouth 3 times daily as needed for dizziness. Past Week    metoclopramide (REGLAN) 5 MG tablet Take 0.5 tablets (2.5 mg) by mouth 3 times daily as needed  (before meals for nausea). Past Week    metoprolol tartrate (LOPRESSOR) 25 MG tablet Take 0.5 tablets (12.5 mg) by mouth 2 times daily. Past Week    mirtazapine (REMERON) 7.5 MG tablet Take 2 tablets (15 mg) by mouth at bedtime. Past Week    pantoprazole (PROTONIX) 40 MG EC tablet Take 1 tablet (40 mg) by mouth 2 times daily. Past Week    polyethylene glycol (MIRALAX) 17 GM/Dose powder Take 17 g (1 Capful) by mouth daily as needed for constipation. Past Month    pregabalin (LYRICA) 50 MG capsule Take 1 capsule (50 mg) by mouth 2 times daily. Past Week    Thiamine HCl (B-1) 100 MG TABS Take 1 tablet by mouth daily at 2 pm. Unknown    traZODone (DESYREL) 100 MG tablet Take 1 tablet (100 mg) by mouth at bedtime. Past Week    vitamin E 400 units TABS Take 400 Units by mouth daily. Unknown

## 2024-12-20 NOTE — PLAN OF CARE
"  Problem: Adult Inpatient Plan of Care  Goal: Plan of Care Review  Description: The Plan of Care Review/Shift note should be completed every shift.  The Outcome Evaluation is a brief statement about your assessment that the patient is improving, declining, or no change.  This information will be displayed automatically on your shift  note.  Outcome: Progressing  Flowsheets (Taken 12/20/2024 0562)  Plan of Care Reviewed With: patient  Overall Patient Progress: no change  Goal: Patient-Specific Goal (Individualized)  Description: You can add care plan individualizations to a care plan. Examples of Individualization might be:  \"Parent requests to be called daily at 9am for status\", \"I have a hard time hearing out of my right ear\", or \"Do not touch me to wake me up as it startles  me\".  Outcome: Progressing  Goal: Absence of Hospital-Acquired Illness or Injury  Outcome: Progressing  Intervention: Prevent Skin Injury  Recent Flowsheet Documentation  Taken 12/20/2024 0500 by Julia Bronson RN  Body Position: neutral head position  Goal: Optimal Comfort and Wellbeing  Outcome: Progressing  Goal: Readiness for Transition of Care  Outcome: Progressing   Goal Outcome Evaluation:      Plan of Care Reviewed With: patient    Overall Patient Progress: no change  Patient is alert and oriented x3, able to make needs known to staff. SBA / walker, incontinent of bowel and bladder. Denies abdominal discomfort. BS active / passing flatus, dry and flaky skin. Regular diet. Denies nausea/vomiting. L PIV SL  CONTACT and DROPLET precautions maintained. Continue with plan of care. Call light within reach        "

## 2024-12-20 NOTE — SIGNIFICANT EVENT
Brief Note     Conversation with daughter over the phone:      Health has declined since thanksgiving.  More fatigued, resting a lot.  Some episodes of dizziness.  More incontinence and hygiene issues.  Difficult with meals so set up meals on wheels.  Called on Tuesday and was told that she was feeling strange.  Felt disoriented.  Was peeing a lot.      Daughter concerned that she isn't safe at home.  Concerned about self hygiene.  Recent weakness albeit in the setting of illness.  Would like resources for home support if she is going back home.  Started elderly waiver program.      Damien Maldonado DO, MPH  Med-Peds Hospitalist  [place] [id number]

## 2024-12-21 PROCEDURE — 120N000002 HC R&B MED SURG/OB UMMC

## 2024-12-21 PROCEDURE — 250N000013 HC RX MED GY IP 250 OP 250 PS 637: Performed by: STUDENT IN AN ORGANIZED HEALTH CARE EDUCATION/TRAINING PROGRAM

## 2024-12-21 PROCEDURE — 250N000011 HC RX IP 250 OP 636: Performed by: INTERNAL MEDICINE

## 2024-12-21 PROCEDURE — 97535 SELF CARE MNGMENT TRAINING: CPT | Mod: GO

## 2024-12-21 PROCEDURE — 97165 OT EVAL LOW COMPLEX 30 MIN: CPT | Mod: GO

## 2024-12-21 PROCEDURE — 250N000013 HC RX MED GY IP 250 OP 250 PS 637: Performed by: INTERNAL MEDICINE

## 2024-12-21 PROCEDURE — 97530 THERAPEUTIC ACTIVITIES: CPT | Mod: GP

## 2024-12-21 PROCEDURE — 99232 SBSQ HOSP IP/OBS MODERATE 35: CPT | Performed by: STUDENT IN AN ORGANIZED HEALTH CARE EDUCATION/TRAINING PROGRAM

## 2024-12-21 PROCEDURE — 97162 PT EVAL MOD COMPLEX 30 MIN: CPT | Mod: GP

## 2024-12-21 RX ORDER — POTASSIUM CHLORIDE 20MEQ/15ML
20 LIQUID (ML) ORAL ONCE
Status: COMPLETED | OUTPATIENT
Start: 2024-12-21 | End: 2024-12-21

## 2024-12-21 RX ORDER — MULTIPLE VITAMINS W/ MINERALS TAB 9MG-400MCG
1 TAB ORAL DAILY
Status: DISCONTINUED | OUTPATIENT
Start: 2024-12-21 | End: 2024-12-26 | Stop reason: HOSPADM

## 2024-12-21 RX ORDER — POLYETHYLENE GLYCOL 3350 17 G/17G
17 POWDER, FOR SOLUTION ORAL DAILY
Status: DISCONTINUED | OUTPATIENT
Start: 2024-12-21 | End: 2024-12-26 | Stop reason: HOSPADM

## 2024-12-21 RX ADMIN — MIRTAZAPINE 15 MG: 15 TABLET, FILM COATED ORAL at 21:05

## 2024-12-21 RX ADMIN — FOLIC ACID 1000 MCG: 1 TABLET ORAL at 08:33

## 2024-12-21 RX ADMIN — DICYCLOMINE HYDROCHLORIDE 10 MG: 10 CAPSULE ORAL at 20:13

## 2024-12-21 RX ADMIN — DICYCLOMINE HYDROCHLORIDE 10 MG: 10 CAPSULE ORAL at 14:13

## 2024-12-21 RX ADMIN — Medication 1 TABLET: at 16:07

## 2024-12-21 RX ADMIN — TRAZODONE HYDROCHLORIDE 100 MG: 50 TABLET ORAL at 21:06

## 2024-12-21 RX ADMIN — MAGNESIUM OXIDE TAB 400 MG (241.3 MG ELEMENTAL MG) 400 MG: 400 (241.3 MG) TAB at 08:33

## 2024-12-21 RX ADMIN — CETIRIZINE HYDROCHLORIDE 10 MG: 10 TABLET, FILM COATED ORAL at 14:13

## 2024-12-21 RX ADMIN — Medication 12.5 MG: at 08:33

## 2024-12-21 RX ADMIN — PANTOPRAZOLE SODIUM 40 MG: 40 TABLET, DELAYED RELEASE ORAL at 08:33

## 2024-12-21 RX ADMIN — ASPIRIN 81 MG: 81 TABLET, COATED ORAL at 08:33

## 2024-12-21 RX ADMIN — PANTOPRAZOLE SODIUM 40 MG: 40 TABLET, DELAYED RELEASE ORAL at 16:07

## 2024-12-21 RX ADMIN — DICYCLOMINE HYDROCHLORIDE 10 MG: 10 CAPSULE ORAL at 08:33

## 2024-12-21 RX ADMIN — Medication 12.5 MG: at 20:13

## 2024-12-21 RX ADMIN — CYANOCOBALAMIN TAB 500 MCG 500 MCG: 500 TAB at 08:33

## 2024-12-21 RX ADMIN — FERROUS SULFATE TAB 325 MG (65 MG ELEMENTAL FE) 325 MG: 325 (65 FE) TAB at 08:33

## 2024-12-21 RX ADMIN — PREGABALIN 50 MG: 50 CAPSULE ORAL at 08:33

## 2024-12-21 RX ADMIN — ATORVASTATIN CALCIUM 40 MG: 40 TABLET, FILM COATED ORAL at 08:33

## 2024-12-21 RX ADMIN — PREGABALIN 50 MG: 50 CAPSULE ORAL at 20:13

## 2024-12-21 RX ADMIN — DOXYCYCLINE HYCLATE 100 MG: 100 CAPSULE ORAL at 08:33

## 2024-12-21 RX ADMIN — CEFTRIAXONE SODIUM 1 G: 1 INJECTION, POWDER, FOR SOLUTION INTRAMUSCULAR; INTRAVENOUS at 20:13

## 2024-12-21 RX ADMIN — DOXYCYCLINE HYCLATE 100 MG: 100 CAPSULE ORAL at 20:13

## 2024-12-21 RX ADMIN — POTASSIUM CHLORIDE 20 MEQ: 1.5 SOLUTION ORAL at 11:59

## 2024-12-21 ASSESSMENT — ACTIVITIES OF DAILY LIVING (ADL)
ADLS_ACUITY_SCORE: 51
ADLS_ACUITY_SCORE: 61
ADLS_ACUITY_SCORE: 57
ADLS_ACUITY_SCORE: 61
ADLS_ACUITY_SCORE: 61
ADLS_ACUITY_SCORE: 55
ADLS_ACUITY_SCORE: 57
ADLS_ACUITY_SCORE: 55
ADLS_ACUITY_SCORE: 61
ADLS_ACUITY_SCORE: 57
ADLS_ACUITY_SCORE: 61
ADLS_ACUITY_SCORE: 57
ADLS_ACUITY_SCORE: 57
ADLS_ACUITY_SCORE: 61
ADLS_ACUITY_SCORE: 55
ADLS_ACUITY_SCORE: 57
ADLS_ACUITY_SCORE: 55
ADLS_ACUITY_SCORE: 59
ADLS_ACUITY_SCORE: 57
ADLS_ACUITY_SCORE: 61
ADLS_ACUITY_SCORE: 61
ADLS_ACUITY_SCORE: 55
ADLS_ACUITY_SCORE: 57

## 2024-12-21 NOTE — SIGNIFICANT EVENT
While taking meds this morning, patient choked on a pill. Later stated it was stuck in her throat. Writer gave Heimlich thrusts and was able to dislodge pill. Staff assist called. Oxygen sats dipped into 70's-80's briefly but recovered within 2 minutes. Oxymask applied with 6L of oxygen and decreased as able. Respiratory assessed and was able to taper patient back to 2.5L oxygen. Provider in to assess patient.

## 2024-12-21 NOTE — PROGRESS NOTES
"   12/21/24 0319   Appointment Info   Signing Clinician's Name / Credentials (OT) Wes Garcia, OTR/L   Living Environment   People in Home alone   Current Living Arrangements apartment   Home Accessibility no concerns   Transportation Anticipated family or friend will provide   Living Environment Comments Pt lives alone in apartment, walk in shower with shower chair and grab bars. Pt reports she uses her 4WW out in the community but does not use an assistive device in the home. Pt reports she moves around her apartment well because it is \"very small\". Daughter is local and lives nearby but is not there for to assist.   Self-Care   Usual Activity Tolerance moderate   Current Activity Tolerance fair   Regular Exercise No   Fall history within last six months no   Activity/Exercise/Self-Care Comment Patient is independent and lives alone. Does not drive, daughter assists with transportation.   General Information   Onset of Illness/Injury or Date of Surgery 12/18/24   Referring Physician Damien Maldonado,    Patient/Family Therapy Goal Statement (OT) Get better and go home   Existing Precautions/Restrictions oxygen therapy device and L/min  (2.5 at baseline)   Cognitive Status Examination   Orientation Status orientation to person, place and time   Follows Commands follows one-step commands;75-90% accuracy;repetition of directions required;verbal cues/prompting required   Strength Comprehensive (MMT)   Comment, General Manual Muscle Testing (MMT) Assessment Generalized weakness noted w/ ADL and transfers   Bed Mobility   Bed Mobility supine-sit;sit-supine;scooting/bridging   Scooting/Bridging Prole (Bed Mobility) supervision   Supine-Sit Prole (Bed Mobility) supervision   Transfers   Transfers sit-stand transfer   Sit-Stand Transfer   Sit-Stand Prole (Transfers) contact guard   Activities of Daily Living   BADL Assessment/Intervention lower body dressing   Lower Body Dressing Assessment/Training "   [place] Level (Lower Body Dressing) minimum assist (75% patient effort)   Clinical Impression   Criteria for Skilled Therapeutic Interventions Met (OT) Yes, treatment indicated   OT Diagnosis impaired ADL   OT Problem List-Impairments impacting ADL problems related to;activity tolerance impaired;cognition;strength;mobility   Assessment of Occupational Performance 3-5 Performance Deficits   Identified Performance Deficits dressing, bathing, toileting, functional mobility   Planned Therapy Interventions (OT) ADL retraining;progressive activity/exercise;transfer training   Clinical Decision Making Complexity (OT) problem focused assessment/low complexity   Risk & Benefits of therapy have been explained evaluation/treatment results reviewed;care plan/treatment goals reviewed;risks/benefits reviewed;current/potential barriers reviewed;participants voiced agreement with care plan;participants included;patient   OT Total Evaluation Time   OT Eval, Low Complexity Minutes (31736) 10   OT Goals   Therapy Frequency (OT) 5 times/week   OT Predicted Duration/Target Date for Goal Attainment 01/10/25   OT Goals Hygiene/Grooming;Upper Body Dressing;Lower Body Dressing;Lower Body Bathing;Toilet Transfer/Toileting   OT: Hygiene/Grooming supervision/stand-by assist;using adaptive equipment   OT: Upper Body Dressing Supervision/stand-by assist;using adaptive equipment   OT: Lower Body Dressing Supervision/stand-by assist;using adaptive equipment   OT: Lower Body Bathing Supervision/stand-by assist;using adaptive equipment   OT: Toilet Transfer/Toileting Supervision/stand-by assist;toilet transfer;cleaning and garment management;using adaptive equipment   Self-Care/Home Management   Self-Care/Home Mgmt/ADL, Compensatory, Meal Prep Minutes (17377) 15   Symptoms Noted During/After Treatment (Meal Preparation/Planning Training) fatigue   Treatment Detail/Skilled Intervention Transfering to EOB w/ SBA, VCs for scooting fully to EOB  and brining feet to floor. spO2 stable on supplemental O2. completing LB dressing, changing briefs at EOB. Pt able to thread over feet and hike to knees SBA. STS w/ CGA and FWW, CG-min A for standing balance as pt completes LB dressing, requires cue to hike above hips prior to sitting. Side stepping at EOB toward HOB w/ CGA and VCs. Sitting and transfering supine w/ SBA. cues to center self in bed. Positioning pt w/ all needs in reach at OT departure.   OT Discharge Planning   OT Plan ADL tolerance, standing g/h, dressing, toileting, progress to bathroom   OT Discharge Recommendation (DC Rec) Transitional Care Facility   OT Rationale for DC Rec Pt presents below baseline with weakness and decreased activity tolerance. Pt baseline independent but now requiring Ax1 for transfers and ADL. Recommend TCU to progress strength and indepenence with I/ADLs.

## 2024-12-21 NOTE — PLAN OF CARE
Goal Outcome Evaluation:      Plan of Care Reviewed With: patient, child    Overall Patient Progress: no changeOverall Patient Progress: no change    Outcome Evaluation: Alert and oriented with intermittent confusion. Weak cough. Regular thin diet, give pills whole with applesauce to prevent choking/pills getting stuck in throat. Incontinent of bladder. Assist of 1 with walker. Seen by PT and OT today. See Significant Event note for further information about choking incident today. Family aware. Otherwise sleeping in between cares and sessions, on 2.5 L oxygen via nasal cannula, which per family is baseline. Needs help ordering food. Family ordered dinner for her today.

## 2024-12-21 NOTE — PLAN OF CARE
Goal Outcome Evaluation:               /61 (BP Location: Right arm)   Pulse 58   Temp 98.5  F (36.9  C) (Axillary)   Resp (!) 36   Ht 1.524 m (5')   Wt 41.7 kg (91 lb 14.9 oz)   SpO2 90%   BMI 17.95 kg/m           Neuro:  A&Ox4  Pulm/Resp: 4L NC de stat when flat  GI/: incont voiding adequately in diaper  Access: PIV x1  Skin: Open wound to sacrum. Cover with mepliex.

## 2024-12-21 NOTE — PROGRESS NOTES
"   12/21/24 1302   Appointment Info   Signing Clinician's Name / Credentials (PT) Alfreda Laureano, PT, DPT   Living Environment   People in Home alone   Current Living Arrangements apartment   Home Accessibility no concerns   Transportation Anticipated family or friend will provide   Living Environment Comments Pt lives alone in apartment, walk in shower with shower chair and grab bars. Pt reports she uses her 4WW out in the community but does not use an assistive device in the home. Pt reports she moves around her apartment well because it is \"very small\". Daughter is local and lives nearby but is not there for to assist.   Self-Care   Usual Activity Tolerance moderate   Current Activity Tolerance fair   Regular Exercise No   Equipment Currently Used at Home shower chair;cane, straight;walker, rolling;grab bar, tub/shower  (Pt has the above equipment but reports that she rarely uses them. The shower chair is in her shower but she does not use, intermittently has used a cane in the home but usually no AD, 4WW used in community.)   Fall history within last six months no   Activity/Exercise/Self-Care Comment Pt reports she does not do any physical activity besides walking around her home. Has not been up out of bed since admission per pt.   General Information   Onset of Illness/Injury or Date of Surgery 12/20/24   Referring Physician Damien Maldonado DO   Patient/Family Therapy Goals Statement (PT) Return home   Pertinent History of Current Problem (include personal factors and/or comorbidities that impact the POC) Per EMR: \"Vandana Ivey is a 79 year old female with history most notable for pulmonary fibrosis with chronic home O2 of 2.5 L, meningioma status postresection, hiatal hernia s/p repair, IBS, chronic dizziness, osteoporosis who presented from home with progressive dizziness and shortness of breath admitted to [facility] for sepsis.  Workup notable for parainfluenza infection on respiratory viral " "panel as well as E. coli urinary tract infection and findings concerning for bacterial pneumonia.  Patient transferred from [facility] to [facility] on 12/19.\"   Existing Precautions/Restrictions fall;oxygen therapy device and L/min  (2,5L via NC)   Weight-Bearing Status - LUE full weight-bearing   Weight-Bearing Status - RUE full weight-bearing   Weight-Bearing Status - LLE full weight-bearing   Weight-Bearing Status - RLE full weight-bearing   General Observations Pt awake and eating Jello upon PT arrival. Pt having difficulty not spilling jello. Pt reported feeling much more fatigued than normal.   Cognition   Affect/Mental Status (Cognition) low arousal/lethargic;confused   Cognitive Status Comments Pt intermittently confused with writer's instructions/commands, stood up when instructed to lay down   Pain Assessment   Patient Currently in Pain Yes, see Vital Sign flowsheet  (Pt reports back pain)   Integumentary/Edema   Integumentary/Edema no deficits were identifed   Posture    Posture Protracted shoulders;Forward head position   Range of Motion (ROM)   Range of Motion ROM is WFL   ROM Comment BUE/BLE ROM WFL, decreased knee extension in standing   Strength (Manual Muscle Testing)   Strength Comments BUE/BLE strength >3/5 per functional screen   Bed Mobility   Comment, (Bed Mobility) supine>seated EOB SBA, HOB elevated, use of bed rails   Transfers   Comment, (Transfers) STS to FWW CGA   Gait/Stairs (Locomotion)   Comment, (Gait/Stairs) Pt took ~2 steps with FWW and CGA   Balance   Balance Comments No balance deficits noted in static sitting EOB   Sensory Examination   Sensory Perception patient reports no sensory changes   Coordination   Coordination no deficits were identified   Muscle Tone   Muscle Tone no deficits were identified   Clinical Impression   Criteria for Skilled Therapeutic Intervention Yes, treatment indicated   PT Diagnosis (PT) Impaired functional mobility   Influenced by the following " impairments decreased activity tolerance, weakness   Functional limitations due to impairments bed mobility, transfers, ambulation   Clinical Presentation (PT Evaluation Complexity) evolving   Clinical Presentation Rationale Clinical jugement   Clinical Decision Making (Complexity) moderate complexity   Planned Therapy Interventions (PT) balance training;bed mobility training;gait training;home exercise program;patient/family education;ROM (range of motion);stair training;strengthening;transfer training;risk factor education;progressive activity/exercise;home program guidelines   Risk & Benefits of therapy have been explained evaluation/treatment results reviewed;care plan/treatment goals reviewed;risks/benefits reviewed;current/potential barriers reviewed;participants voiced agreement with care plan;participants included;patient   Clinical Impression Comments Pt would benefit from skilled IP PT to progress ind with mobility and strength.   PT Total Evaluation Time   PT Eval, Moderate Complexity Minutes (77531) 9   Physical Therapy Goals   PT Frequency 5x/week   PT Predicted Duration/Target Date for Goal Attainment 01/10/25   PT Goals Transfers;Gait;Bed Mobility   PT: Bed Mobility Independent;Supine to/from sit;Rolling;Bridging   PT: Transfers Independent;Assistive device;Sit to/from stand;Bed to/from chair   PT: Gait Modified independent;Assistive device;Rolling walker;150 feet   PT Discharge Planning   PT Plan progress amb with FWW (could trial 4WW as this is what pt has at home), repeated STS   PT Discharge Recommendation (DC Rec) Transitional Care Facility   PT Rationale for DC Rec Pt is mobilizing below her baseline, requiring Ax1 for all mobility on this date and only able to take a few steps due to fatigue. Pt lives alone at baseline and daughter is already concerned about pt requiring increased needs. Recommend TCU to progress ind with functional mobility but pending progress with therapies and prognosis, pt  may require long term care.   PT Brief overview of current status Ax1 with FWW   Physical Therapy Time and Intention   Timed Code Treatment Minutes 40   Total Session Time (sum of timed and untimed services) 49

## 2024-12-21 NOTE — PLAN OF CARE
[id number]  Major Shift Events:    - afebrile, Aox4, on 4 lpm of oxygen for support via NC, patient communicative and verbalizing pain in the IV site   - PIV infiltrated, removed IV  - patient denies chest pain and dizziness  Plan:   - PIV insertion , and antibiotic administration   - wean O2 as patient tolerates   For vital signs and complete assessments, please see documentation flowsheets.     Goal Outcome Evaluation:      Plan of Care Reviewed With: patient    Overall Patient Progress: no changeOverall Patient Progress: no change

## 2024-12-21 NOTE — PLAN OF CARE
Goal Outcome Evaluation:       /55   Pulse 59   Temp 98.7  F (37.1  C) (Oral)   Resp 16   Ht 1.524 m (5')   Wt 41.7 kg (91 lb 14.9 oz)   SpO2 98%   BMI 17.95 kg/m       Shift: 6145-8594       Goal Outcome Evaluation:       Plan of Care Reviewed With: patient     Overall Patient Progress: no change  Patient is alert and oriented x3, with intermittent confusion. Weak cough. able to make needs known to staff. SBA / walker, incontinent of bowel and bladder. Denies abdominal discomfort. BS active / passing flatus, dry and flaky skin. Regular diet. Denies nausea/vomiting. L PIV SL  CONTACT and DROPLET precautions maintained.   Wound on the sacrum dressing changed on this shift, Mepilex on, dressing C/D/I.  Call light within reach, bed alarm is on.     Continue with plan of care.

## 2024-12-22 LAB
ANION GAP SERPL CALCULATED.3IONS-SCNC: 4 MMOL/L (ref 7–15)
BUN SERPL-MCNC: 6.7 MG/DL (ref 8–23)
CALCIUM SERPL-MCNC: 8.7 MG/DL (ref 8.8–10.4)
CHLORIDE SERPL-SCNC: 99 MMOL/L (ref 98–107)
CREAT SERPL-MCNC: 0.46 MG/DL (ref 0.51–0.95)
EGFRCR SERPLBLD CKD-EPI 2021: >90 ML/MIN/1.73M2
ERYTHROCYTE [DISTWIDTH] IN BLOOD BY AUTOMATED COUNT: 16.4 % (ref 10–15)
GLUCOSE SERPL-MCNC: 97 MG/DL (ref 70–99)
HCO3 SERPL-SCNC: 37 MMOL/L (ref 22–29)
HCT VFR BLD AUTO: 28.7 % (ref 35–47)
HGB BLD-MCNC: 8.6 G/DL (ref 11.7–15.7)
MCH RBC QN AUTO: 26.1 PG (ref 26.5–33)
MCHC RBC AUTO-ENTMCNC: 30 G/DL (ref 31.5–36.5)
MCV RBC AUTO: 87 FL (ref 78–100)
PLATELET # BLD AUTO: 231 10E3/UL (ref 150–450)
POTASSIUM SERPL-SCNC: 3.9 MMOL/L (ref 3.4–5.3)
RBC # BLD AUTO: 3.3 10E6/UL (ref 3.8–5.2)
SODIUM SERPL-SCNC: 140 MMOL/L (ref 135–145)
WBC # BLD AUTO: 6.6 10E3/UL (ref 4–11)

## 2024-12-22 PROCEDURE — 250N000013 HC RX MED GY IP 250 OP 250 PS 637: Performed by: STUDENT IN AN ORGANIZED HEALTH CARE EDUCATION/TRAINING PROGRAM

## 2024-12-22 PROCEDURE — 99232 SBSQ HOSP IP/OBS MODERATE 35: CPT | Performed by: STUDENT IN AN ORGANIZED HEALTH CARE EDUCATION/TRAINING PROGRAM

## 2024-12-22 PROCEDURE — 250N000011 HC RX IP 250 OP 636: Performed by: STUDENT IN AN ORGANIZED HEALTH CARE EDUCATION/TRAINING PROGRAM

## 2024-12-22 PROCEDURE — 36415 COLL VENOUS BLD VENIPUNCTURE: CPT | Performed by: STUDENT IN AN ORGANIZED HEALTH CARE EDUCATION/TRAINING PROGRAM

## 2024-12-22 PROCEDURE — 120N000002 HC R&B MED SURG/OB UMMC

## 2024-12-22 PROCEDURE — 250N000013 HC RX MED GY IP 250 OP 250 PS 637: Performed by: INTERNAL MEDICINE

## 2024-12-22 PROCEDURE — 80048 BASIC METABOLIC PNL TOTAL CA: CPT | Performed by: STUDENT IN AN ORGANIZED HEALTH CARE EDUCATION/TRAINING PROGRAM

## 2024-12-22 PROCEDURE — 85027 COMPLETE CBC AUTOMATED: CPT | Performed by: STUDENT IN AN ORGANIZED HEALTH CARE EDUCATION/TRAINING PROGRAM

## 2024-12-22 RX ORDER — CEFTRIAXONE 2 G/1
2 INJECTION, POWDER, FOR SOLUTION INTRAMUSCULAR; INTRAVENOUS EVERY 24 HOURS
Status: DISCONTINUED | OUTPATIENT
Start: 2024-12-22 | End: 2024-12-24

## 2024-12-22 RX ADMIN — FOLIC ACID 1000 MCG: 1 TABLET ORAL at 09:06

## 2024-12-22 RX ADMIN — PANTOPRAZOLE SODIUM 40 MG: 40 TABLET, DELAYED RELEASE ORAL at 09:06

## 2024-12-22 RX ADMIN — ASPIRIN 81 MG: 81 TABLET, COATED ORAL at 09:06

## 2024-12-22 RX ADMIN — DICYCLOMINE HYDROCHLORIDE 10 MG: 10 CAPSULE ORAL at 09:06

## 2024-12-22 RX ADMIN — CYANOCOBALAMIN TAB 500 MCG 500 MCG: 500 TAB at 09:06

## 2024-12-22 RX ADMIN — DOXYCYCLINE HYCLATE 100 MG: 100 CAPSULE ORAL at 20:53

## 2024-12-22 RX ADMIN — MAGNESIUM OXIDE TAB 400 MG (241.3 MG ELEMENTAL MG) 400 MG: 400 (241.3 MG) TAB at 09:06

## 2024-12-22 RX ADMIN — POLYETHYLENE GLYCOL 3350 17 G: 17 POWDER, FOR SOLUTION ORAL at 09:06

## 2024-12-22 RX ADMIN — DICYCLOMINE HYDROCHLORIDE 10 MG: 10 CAPSULE ORAL at 14:06

## 2024-12-22 RX ADMIN — PREGABALIN 50 MG: 50 CAPSULE ORAL at 20:54

## 2024-12-22 RX ADMIN — Medication 12.5 MG: at 09:05

## 2024-12-22 RX ADMIN — TRAZODONE HYDROCHLORIDE 100 MG: 50 TABLET ORAL at 21:05

## 2024-12-22 RX ADMIN — Medication 1 TABLET: at 09:06

## 2024-12-22 RX ADMIN — CEFTRIAXONE SODIUM 2 G: 2 INJECTION, POWDER, FOR SOLUTION INTRAMUSCULAR; INTRAVENOUS at 20:54

## 2024-12-22 RX ADMIN — ATORVASTATIN CALCIUM 40 MG: 40 TABLET, FILM COATED ORAL at 09:06

## 2024-12-22 RX ADMIN — MIRTAZAPINE 15 MG: 15 TABLET, FILM COATED ORAL at 21:05

## 2024-12-22 RX ADMIN — DOXYCYCLINE HYCLATE 100 MG: 100 CAPSULE ORAL at 09:06

## 2024-12-22 RX ADMIN — CETIRIZINE HYDROCHLORIDE 10 MG: 10 TABLET, FILM COATED ORAL at 14:06

## 2024-12-22 RX ADMIN — PANTOPRAZOLE SODIUM 40 MG: 40 TABLET, DELAYED RELEASE ORAL at 17:00

## 2024-12-22 RX ADMIN — Medication 12.5 MG: at 20:53

## 2024-12-22 RX ADMIN — FERROUS SULFATE TAB 325 MG (65 MG ELEMENTAL FE) 325 MG: 325 (65 FE) TAB at 09:06

## 2024-12-22 RX ADMIN — DICYCLOMINE HYDROCHLORIDE 10 MG: 10 CAPSULE ORAL at 20:53

## 2024-12-22 RX ADMIN — PREGABALIN 50 MG: 50 CAPSULE ORAL at 09:06

## 2024-12-22 ASSESSMENT — ACTIVITIES OF DAILY LIVING (ADL)
ADLS_ACUITY_SCORE: 61
ADLS_ACUITY_SCORE: 65
ADLS_ACUITY_SCORE: 61
ADLS_ACUITY_SCORE: 65
ADLS_ACUITY_SCORE: 61
ADLS_ACUITY_SCORE: 61
ADLS_ACUITY_SCORE: 65

## 2024-12-22 NOTE — PROGRESS NOTES
[facility]    Medicine Progress Note - Hospitalist Service, GOLD TEAM 21    Date of Admission:  12/18/2024     Assessment & Plan   Vandana Ivey is a 79 year old female with history most notable for pulmonary fibrosis with chronic home O2 of 2.5 L, meningioma status postresection, hiatal hernia s/p repair, IBS, chronic dizziness, osteoporosis who presented from home with progressive dizziness and shortness of breath admitted to [facility] for sepsis.  Workup notable for parainfluenza infection on respiratory viral panel as well as E. coli urinary tract infection and findings concerning for bacterial pneumonia.  Patient transferred from [facility] to [facility] on 12/19.  Patient admitted for IV antibiotics for the treatment of urinary tract infection.    #Choking episode  On 12/21, patient choked on a pill during medication administration with nurse at bedside.  Nurse performed Heimlich dislodging the pill.  Patient returned to baseline respiratory status following incident.    #E. coli urinary tract infection  # Acute on chronic respiratory failure secondary to community-acquired pneumonia and parainfluenza infection  Patient presented with progressive shortness of breath with vague but progressive dizziness.  Lactic acid within normal limits.  UA consistent with infectious with subsequent culture growing greater than 100,000 E. coli.  Reports significant urinary frequency prior to admission.  Patient has shown clinical improvement since the start of antibiotics.  Urine culture susceptible to third-generation beta-lactam's.  - Continue ceftriaxone and doxycycline  -Follow-up blood cultures, no growth to date  -Weaned to baseline oxygen need as able    #Pulmonary fibrosis  Baseline oxygen need of 2 to 3 L at home.  Initial VBG with significant CO2 retention of chronic pattern.  Unclear baseline as no prior gas in system.  Follows with pulmonary-last clinic  "date 11/19 still under workup for ILD.  -Echo-remarkable for severe left atrial enlargement, mild pulmonary hypertension LV function and global right ventricular function being normal with mild enlargement  -Will have low threshold to consult from pulmonology if respiratory issues    #Goals of care  Per admitting provider-\"Patient reports she wants to think about it if she wants to be full code or DNR.  Will order full code for now.  Given pulmonary fibrosis and progressive hypercapnia and hypoxia.  I am worried that she might require intubation during this hospitalization.  Patient is also more keen towards going home than any prolonged hospitalization or rehab.  Daughter is aware and understands. Patient is currently able to take her own medication at home and lives independently, gets Meals on Wheels \" up-to-date on 12/20-I had a long discussion with daughter over the phone (see additional note on same day).  She later discussed with patient and ultimate decision was made by patient for CODE STATUS to be changed to DNR/DNI.  -PT/OT  -Speech assessment - Recommend continue current regular diet and thin liquids with patient self-selecting softer solids.     #Mild hyperkalemia-potassium of 3.2 on 12/21.  Will replete with oral liquid solution and recheck in AM.    #Polypharmacy  Reviewed for med rec, will restart Lyrica Remeron and trazodone    Chronic Conditions:  Osteoporosis without current pathological fracture, unspecified osteoporosis type  Hiatal hernia  Gastroesophageal reflux disease   Dizziness  Benign paroxysmal positional vertigo, unspecified laterality  Meningioma (H) s/p resection.   Insomnia,   Irritable bowel syndrome with constipation  Gastroparesis        Diet: Regular Diet Adult  Snacks/Supplements Adult: Ensure Enlive; With Meals    DVT Prophylaxis: Pneumatic Compression Devices  Burns Catheter: Not present  Lines: None       Cardiac Monitoring: None  Code Status: No CPR- Do NOT Intubate  "     Clinically Significant Risk Factors        # Hypokalemia: Lowest K = 3.2 mmol/L in last 2 days, will replace as needed   # Hypochloremia: Lowest Cl = 96 mmol/L in last 2 days, will monitor as appropriate                             # Cachexia: Estimated body mass index is 17.95 kg/m  as calculated from the following:    Height as of this encounter: 1.524 m (5').    Weight as of this encounter: 41.7 kg (91 lb 14.9 oz)., PRESENT ON ADMISSION                Social Drivers of Health            Disposition Plan     Medically Ready for Discharge: Anticipated in 2-4 Days         Damien Maldonado DO, MPH  Internal Medicine-Pediatrics Hospitalist  Hospitalist Service, [facility] 21  [facility]  Securely message with [employer] (more info)  Text page via [facility] Paging/Directory   See signed in provider for up to date coverage information  ______________________________________________________________________    Interval History   Nursing and any cross cover notes reviewed.  See above regarding choking event this morning.    Patient seen at bedside this AM.  Patient reports, other than choking event, feeling much better.  Reports urinary symptoms have improved.  Patient looks forward to next step of working with PT/OT.    Physical Exam   Vital Signs: Temp: 98.7  F (37.1  C) Temp src: Oral BP: 132/55 Pulse: 59   Resp: 16 SpO2: 98 % O2 Device: Nasal cannula Oxygen Delivery: 4 LPM  Weight: 91 lbs 14.91 oz    No apparent distress, awake, lungs clear to auscultation anteriorly although limited by shallow breaths, mild tachypnea when speaking in long sentences, belly soft and nontender, no peripheral edema    Medical Decision Making           Data         Imaging results reviewed over the past 24 hrs:   No results found for this or any previous visit (from the past 24 hours).

## 2024-12-22 NOTE — PLAN OF CARE
Goal Outcome Evaluation:      Plan of Care Reviewed With: patient    Overall Patient Progress: improvingOverall Patient Progress: improving     Shift: 0700 - 1530     VS: Blood pressure 131/65, pulse 73, temperature 97.9  F (36.6  C), temperature source Axillary, resp. rate 16, height 1.524 m (5'), weight 41.7 kg (91 lb 14.9 oz), SpO2 100%.     Pain:  Pt denies any pain this shift.   Neuro: Alert and oriented with intermittent confusion   Resp: Weak cough   Diet: Regular diet    Skin: Wound on sacrum   LDA: L-PIV,SL   Activity: SBA with walker   Output: Incontinent of bowel and bladder   Additional Info/shift updates: Pt was very tired and slept whole shift.   Call light within reach     Plan of care ongoing

## 2024-12-22 NOTE — PLAN OF CARE
Pt is A/O x3, with intermitted confusion. R thin whole, SBA with walker, incontinent of B/B. L PIV SL, sacrum wound, mepilex applied. Pt is on 2-3 L of oxygen, continue with POC.

## 2024-12-22 NOTE — PLAN OF CARE
Goal Outcome Evaluation:       BP 98/56 (BP Location: Left arm)   Pulse 74   Temp 97.7  F (36.5  C) (Oral)   Resp 16   Ht 1.524 m (5')   Wt 41.7 kg (91 lb 14.9 oz)   SpO2 100%   BMI 17.95 kg/m         Shift: 0545-1596        Goal Outcome Evaluation:       Plan of Care Reviewed With: patient     Overall Patient Progress: no change  Patient is alert and oriented to self, with intermittent confusion. Weak cough. able to make needs known to staff. SBA / walker, incontinent of bowel and bladder. Pt denies discomfort. BS active / passing flatus, dry and flaky skin. Regular diet. Denies nausea/vomiting. L PIV SL. CONTACT and DROPLET precautions maintained.   Wound on the sacrum dressing, has Mepilex on, dressing C/D/I. Regular diet, thin liquid, meds whole with apple sauce. Encouraged to eat and drink.   Call light within reach, bed alarm is on.     Continue with plan of care.

## 2024-12-23 LAB
ALBUMIN SERPL BCG-MCNC: 2.5 G/DL (ref 3.5–5.2)
ALP SERPL-CCNC: 85 U/L (ref 40–150)
ALT SERPL W P-5'-P-CCNC: <5 U/L (ref 0–50)
AMMONIA PLAS-SCNC: 23 UMOL/L (ref 11–51)
AST SERPL W P-5'-P-CCNC: 15 U/L (ref 0–45)
BASE EXCESS BLDV CALC-SCNC: 15.6 MMOL/L (ref -3–3)
BASE EXCESS BLDV CALC-SCNC: 17.1 MMOL/L (ref -3–3)
BILIRUB DIRECT SERPL-MCNC: <0.2 MG/DL (ref 0–0.3)
BILIRUB SERPL-MCNC: <0.2 MG/DL
GLUCOSE BLDC GLUCOMTR-MCNC: 119 MG/DL (ref 70–99)
GLUCOSE BLDC GLUCOMTR-MCNC: 251 MG/DL (ref 70–99)
HCO3 BLDV-SCNC: 45 MMOL/L (ref 21–28)
HCO3 BLDV-SCNC: 45 MMOL/L (ref 21–28)
HOLD SPECIMEN: NORMAL
O2/TOTAL GAS SETTING VFR VENT: 21 %
O2/TOTAL GAS SETTING VFR VENT: 3 %
OXYHGB MFR BLDV: 37 % (ref 70–75)
OXYHGB MFR BLDV: 61 % (ref 70–75)
PCO2 BLDV: 79 MM HG (ref 40–50)
PCO2 BLDV: 82 MM HG (ref 40–50)
PH BLDV: 7.35 [PH] (ref 7.32–7.43)
PH BLDV: 7.37 [PH] (ref 7.32–7.43)
PO2 BLDV: 26 MM HG (ref 25–47)
PO2 BLDV: 36 MM HG (ref 25–47)
PROT SERPL-MCNC: 7.1 G/DL (ref 6.4–8.3)
SAO2 % BLDV: 37.7 % (ref 70–75)
SAO2 % BLDV: 62.1 % (ref 70–75)

## 2024-12-23 PROCEDURE — 82140 ASSAY OF AMMONIA: CPT | Performed by: STUDENT IN AN ORGANIZED HEALTH CARE EDUCATION/TRAINING PROGRAM

## 2024-12-23 PROCEDURE — 36415 COLL VENOUS BLD VENIPUNCTURE: CPT | Performed by: STUDENT IN AN ORGANIZED HEALTH CARE EDUCATION/TRAINING PROGRAM

## 2024-12-23 PROCEDURE — 82040 ASSAY OF SERUM ALBUMIN: CPT | Performed by: STUDENT IN AN ORGANIZED HEALTH CARE EDUCATION/TRAINING PROGRAM

## 2024-12-23 PROCEDURE — 250N000013 HC RX MED GY IP 250 OP 250 PS 637: Performed by: INTERNAL MEDICINE

## 2024-12-23 PROCEDURE — 250N000013 HC RX MED GY IP 250 OP 250 PS 637: Performed by: STUDENT IN AN ORGANIZED HEALTH CARE EDUCATION/TRAINING PROGRAM

## 2024-12-23 PROCEDURE — 99232 SBSQ HOSP IP/OBS MODERATE 35: CPT | Performed by: STUDENT IN AN ORGANIZED HEALTH CARE EDUCATION/TRAINING PROGRAM

## 2024-12-23 PROCEDURE — 120N000002 HC R&B MED SURG/OB UMMC

## 2024-12-23 PROCEDURE — 250N000011 HC RX IP 250 OP 636: Performed by: STUDENT IN AN ORGANIZED HEALTH CARE EDUCATION/TRAINING PROGRAM

## 2024-12-23 PROCEDURE — 97530 THERAPEUTIC ACTIVITIES: CPT | Mod: GP

## 2024-12-23 PROCEDURE — 82805 BLOOD GASES W/O2 SATURATION: CPT | Performed by: STUDENT IN AN ORGANIZED HEALTH CARE EDUCATION/TRAINING PROGRAM

## 2024-12-23 RX ADMIN — PREGABALIN 50 MG: 50 CAPSULE ORAL at 21:53

## 2024-12-23 RX ADMIN — Medication 12.5 MG: at 08:05

## 2024-12-23 RX ADMIN — MIRTAZAPINE 15 MG: 15 TABLET, FILM COATED ORAL at 21:54

## 2024-12-23 RX ADMIN — PREGABALIN 50 MG: 50 CAPSULE ORAL at 08:09

## 2024-12-23 RX ADMIN — ATORVASTATIN CALCIUM 40 MG: 40 TABLET, FILM COATED ORAL at 08:09

## 2024-12-23 RX ADMIN — MAGNESIUM OXIDE TAB 400 MG (241.3 MG ELEMENTAL MG) 400 MG: 400 (241.3 MG) TAB at 08:09

## 2024-12-23 RX ADMIN — DOXYCYCLINE HYCLATE 100 MG: 100 CAPSULE ORAL at 21:54

## 2024-12-23 RX ADMIN — CEFTRIAXONE SODIUM 2 G: 2 INJECTION, POWDER, FOR SOLUTION INTRAMUSCULAR; INTRAVENOUS at 21:54

## 2024-12-23 RX ADMIN — DICYCLOMINE HYDROCHLORIDE 10 MG: 10 CAPSULE ORAL at 08:09

## 2024-12-23 RX ADMIN — Medication 12.5 MG: at 21:53

## 2024-12-23 RX ADMIN — FOLIC ACID 1000 MCG: 1 TABLET ORAL at 08:08

## 2024-12-23 RX ADMIN — FERROUS SULFATE TAB 325 MG (65 MG ELEMENTAL FE) 325 MG: 325 (65 FE) TAB at 08:09

## 2024-12-23 RX ADMIN — Medication 1 TABLET: at 08:09

## 2024-12-23 RX ADMIN — ASPIRIN 81 MG: 81 TABLET, COATED ORAL at 08:09

## 2024-12-23 RX ADMIN — CYANOCOBALAMIN TAB 500 MCG 500 MCG: 500 TAB at 08:09

## 2024-12-23 RX ADMIN — PANTOPRAZOLE SODIUM 40 MG: 40 TABLET, DELAYED RELEASE ORAL at 17:14

## 2024-12-23 RX ADMIN — TRAZODONE HYDROCHLORIDE 100 MG: 50 TABLET ORAL at 21:53

## 2024-12-23 RX ADMIN — DICYCLOMINE HYDROCHLORIDE 10 MG: 10 CAPSULE ORAL at 21:53

## 2024-12-23 RX ADMIN — CETIRIZINE HYDROCHLORIDE 10 MG: 10 TABLET, FILM COATED ORAL at 14:47

## 2024-12-23 RX ADMIN — PANTOPRAZOLE SODIUM 40 MG: 40 TABLET, DELAYED RELEASE ORAL at 08:09

## 2024-12-23 RX ADMIN — DICYCLOMINE HYDROCHLORIDE 10 MG: 10 CAPSULE ORAL at 14:47

## 2024-12-23 RX ADMIN — DOXYCYCLINE HYCLATE 100 MG: 100 CAPSULE ORAL at 08:24

## 2024-12-23 RX ADMIN — POLYETHYLENE GLYCOL 3350 17 G: 17 POWDER, FOR SOLUTION ORAL at 08:08

## 2024-12-23 ASSESSMENT — ACTIVITIES OF DAILY LIVING (ADL)
ADLS_ACUITY_SCORE: 61
ADLS_ACUITY_SCORE: 65
ADLS_ACUITY_SCORE: 61
ADLS_ACUITY_SCORE: 65
ADLS_ACUITY_SCORE: 65
ADLS_ACUITY_SCORE: 61
ADLS_ACUITY_SCORE: 65
DEPENDENT_IADLS:: INDEPENDENT
ADLS_ACUITY_SCORE: 61
ADLS_ACUITY_SCORE: 65
ADLS_ACUITY_SCORE: 65
ADLS_ACUITY_SCORE: 61

## 2024-12-23 NOTE — PROGRESS NOTES
[facility]    Medicine Progress Note - Hospitalist Service, GOLD TEAM 21    Date of Admission:  12/18/2024     Assessment & Plan   Vandana Ivey is a 79 year old female with history most notable for pulmonary fibrosis with chronic home O2 of 2.5 L, meningioma status postresection, hiatal hernia s/p repair, IBS, chronic dizziness, osteoporosis who presented from home with progressive dizziness and shortness of breath admitted to [facility] for sepsis.  Workup notable for parainfluenza infection on respiratory viral panel as well as E. coli urinary tract infection and findings concerning for bacterial pneumonia.  Patient transferred from [facility] to [facility] on 12/19.  Patient admitted for acute on chronic respiratory failure secondary to community-acquired pneumonia and parainfluenza infection complicated by E. coli urinary tract infection.    #Encephalopathy likely secondary to hospital delirium  Patient having intermittent confusion on 12/22.  On 12/23, patient somnolent for a portion of the late morning.  VBG remarkable for pCO2 of 82 but well compensated with a pH within normal range.  Discussed with the pulmonology provider who agrees that this would not account for her mental status change.  Patient perked up when daughter arrived in the afternoon.  - Delirium precautions, continue to monitor    # Acute on chronic respiratory failure secondary to community-acquired pneumonia and parainfluenza infection  #Pulmonary fibrosis  Baseline oxygen need of 2 to 3 L at home.  Initial VBG with significant CO2 retention of chronic pattern.  Unclear baseline as no prior gas in system.  Follows with pulmonary-last clinic date 11/19 still under workup for ILD.  Presented with progressive sore shortness of breath with vague for progressive dizziness.  Echo-remarkable for severe left atrial enlargement, mild pulmonary hypertension LV function and global right  "ventricular function being normal with mild enlargement.  Imaging concerning for possible pneumonia.  Parainfluenza positive.  As of 12/22, back to baseline O2 needs.  Isolated fever on 12/21; no fever since.  - Continue ceftriaxone and doxycycline-ceftriaxone dose increased to 2 g daily on 12/22 (previously 1g)  -12/23 is day 5 of 7; will plan to transition to oral equivalent on day of discharge if still completing course  -Will have low threshold to consult from pulmonology if respiratory issues    #E. coli urinary tract infection  Patient presented with progressive shortness of breath with vague but progressive dizziness.  Lactic acid within normal limits.  UA consistent with infectious with subsequent culture growing greater than 100,000 E. coli.  Reports significant urinary frequency prior to admission.  Patient has shown clinical improvement since the start of antibiotics.  Urine culture susceptible to third-generation beta-lactam's.  - Continue ceftriaxone as above  -Follow-up blood cultures, no growth to date    #Choking episode  On 12/21, patient choked on a pill during medication administration with nurse at bedside.  Nurse performed Heimlich dislodging the pill.  Patient returned to baseline respiratory status following incident.    #Goals of care  Per admitting provider-\"Patient reports she wants to think about it if she wants to be full code or DNR.  Will order full code for now.  Given pulmonary fibrosis and progressive hypercapnia and hypoxia.  I am worried that she might require intubation during this hospitalization.  Patient is also more keen towards going home than any prolonged hospitalization or rehab.  Daughter is aware and understands. Patient is currently able to take her own medication at home and lives independently, gets Meals on Wheels \"  on 12/20-I had a long discussion with daughter over the phone (see additional note on same day).  She later discussed with patient and ultimate decision " was made by patient for CODE STATUS to be changed to DNR/DNI.  -PT/OT-recommending TCU  -Speech assessment - Recommend continue current regular diet and thin liquids with patient self-selecting softer solids.     #Mild hyperkalemia-potassium of 3.2 on 12/21.  Will replete with oral liquid solution and recheck in AM.    #Polypharmacy  Reviewed for med rec, will restart pta  Lyrica Remeron and trazodone    Chronic Conditions:  Osteoporosis without current pathological fracture, unspecified osteoporosis type  Hiatal hernia  Gastroesophageal reflux disease   Dizziness  Benign paroxysmal positional vertigo, unspecified laterality  Meningioma (H) s/p resection.   Insomnia,   Irritable bowel syndrome with constipation  Gastroparesis        Diet: Regular Diet Adult  Snacks/Supplements Adult: Ensure Enlive; With Meals    DVT Prophylaxis: Pneumatic Compression Devices  Burns Catheter: Not present  Lines: None       Cardiac Monitoring: None  Code Status: No CPR- Do NOT Intubate      Clinically Significant Risk Factors                                      # Cachexia: Estimated body mass index is 17.95 kg/m  as calculated from the following:    Height as of this encounter: 1.524 m (5').    Weight as of this encounter: 41.7 kg (91 lb 14.9 oz).                 Social Drivers of Health            Disposition Plan     Medically Ready for Discharge: Anticipated in 2-4 Days         Damien Maldonado DO, MPH  Internal Medicine-Pediatrics Hospitalist  Hospitalist Service, GOLD TEAM [address] [place] [address]  Securely message with [employer] (more info)  Text page via [facility] Paging/Directory   See signed in provider for up to date coverage information  ______________________________________________________________________    Interval History   Nursing and any cross cover notes reviewed.  No acute events overnight.  No increased oxygen needs.    Patient seen at bedside this AM.  Patient reports breathing and  urinary symptoms continues to improve.  Somnolent this morning with better mentation in the afternoon.    Physical Exam   Vital Signs: Temp: 97.6  F (36.4  C) Temp src: Oral BP: 120/62 Pulse: 73   Resp: 16 SpO2: 100 % O2 Device: Nasal cannula Oxygen Delivery: 3 LPM  Weight: 91 lbs 14.91 oz    No apparent distress, somnolent but answers questions, lungs clear to auscultation anteriorly although limited by shallow breaths, mild tachypnea when speaking in long sentences, belly soft and nontender, no peripheral edema    Medical Decision Making           Data         Imaging results reviewed over the past 24 hrs:   No results found for this or any previous visit (from the past 24 hours).

## 2024-12-23 NOTE — PROGRESS NOTES
[facility]    Medicine Progress Note - Hospitalist Service, GOLD TEAM 21    Date of Admission:  12/18/2024     Assessment & Plan   Vandana Ivey is a 79 year old female with history most notable for pulmonary fibrosis with chronic home O2 of 2.5 L, meningioma status postresection, hiatal hernia s/p repair, IBS, chronic dizziness, osteoporosis who presented from home with progressive dizziness and shortness of breath admitted to [facility] for sepsis.  Workup notable for parainfluenza infection on respiratory viral panel as well as E. coli urinary tract infection and findings concerning for bacterial pneumonia.  Patient transferred from [facility] to [facility] on 12/19.  Patient admitted for acute on chronic respiratory failure secondary to community-acquired pneumonia and parainfluenza infection complicated by E. coli urinary tract infection.    # Acute on chronic respiratory failure secondary to community-acquired pneumonia and parainfluenza infection  #Pulmonary fibrosis  Baseline oxygen need of 2 to 3 L at home.  Initial VBG with significant CO2 retention of chronic pattern.  Unclear baseline as no prior gas in system.  Follows with pulmonary-last clinic date 11/19 still under workup for ILD.  Presented with progressive sore shortness of breath with vague for progressive dizziness.  Imaging concerning for possible pneumonia.  Parainfluenza positive.  As of 12/22, back to baseline O2 needs.  -Echo-remarkable for severe left atrial enlargement, mild pulmonary hypertension LV function and global right ventricular function being normal with mild enlargement  - Continue ceftriaxone and doxycycline-ceftriaxone dose increased to 2 g daily on 12/22  -Will have low threshold to consult from pulmonology if respiratory issues    #E. coli urinary tract infection  Patient presented with progressive shortness of breath with vague but progressive dizziness.  Lactic  "acid within normal limits.  UA consistent with infectious with subsequent culture growing greater than 100,000 E. coli.  Reports significant urinary frequency prior to admission.  Patient has shown clinical improvement since the start of antibiotics.  Urine culture susceptible to third-generation beta-lactam's.  - Continue ceftriaxone and doxycycline  -Follow-up blood cultures, no growth to date  -Weaned to baseline oxygen need as able    #Hospital delirium  Patient having intermittent confusion.  Most consistent with hospital delirium.  -Delirium precautions    #Choking episode  On 12/21, patient choked on a pill during medication administration with nurse at bedside.  Nurse performed Heimlich dislodging the pill.  Patient returned to baseline respiratory status following incident.    #Goals of care  Per admitting provider-\"Patient reports she wants to think about it if she wants to be full code or DNR.  Will order full code for now.  Given pulmonary fibrosis and progressive hypercapnia and hypoxia.  I am worried that she might require intubation during this hospitalization.  Patient is also more keen towards going home than any prolonged hospitalization or rehab.  Daughter is aware and understands. Patient is currently able to take her own medication at home and lives independently, gets Meals on Wheels \"  on 12/20-I had a long discussion with daughter over the phone (see additional note on same day).  She later discussed with patient and ultimate decision was made by patient for CODE STATUS to be changed to DNR/DNI.  -PT/OT  -Speech assessment - Recommend continue current regular diet and thin liquids with patient self-selecting softer solids.     #Mild hyperkalemia-potassium of 3.2 on 12/21.  Will replete with oral liquid solution and recheck in AM.    #Polypharmacy  Reviewed for med rec, will restart pta  Lyrica Remeron and trazodone    Chronic Conditions:  Osteoporosis without current pathological fracture, " unspecified osteoporosis type  Hiatal hernia  Gastroesophageal reflux disease   Dizziness  Benign paroxysmal positional vertigo, unspecified laterality  Meningioma (H) s/p resection.   Insomnia,   Irritable bowel syndrome with constipation  Gastroparesis        Diet: Regular Diet Adult  Snacks/Supplements Adult: Ensure Enlive; With Meals    DVT Prophylaxis: Pneumatic Compression Devices  Burns Catheter: Not present  Lines: None       Cardiac Monitoring: None  Code Status: No CPR- Do NOT Intubate      Clinically Significant Risk Factors                                      # Cachexia: Estimated body mass index is 17.95 kg/m  as calculated from the following:    Height as of this encounter: 1.524 m (5').    Weight as of this encounter: 41.7 kg (91 lb 14.9 oz)., PRESENT ON ADMISSION                Social Drivers of Health            Disposition Plan     Medically Ready for Discharge: Anticipated in 2-4 Days         Damien Maldonado DO, MPH  Internal Medicine-Pediatrics Hospitalist  Hospitalist Service, GOLD TEAM 21  [facility]  Securely message with [employer] (more info)  Text page via [facility] Paging/Directory   See signed in provider for up to date coverage information  ______________________________________________________________________    Interval History   Nursing and any cross cover notes reviewed.  No acute events overnight.  No increased oxygen needs.  Does have some intermittent confusion at night.  Has had some incontinent episodes of bowel and bladder.    Patient seen at bedside this AM.  Patient reports improvement in her urinary symptoms.  Patient reports her breathing feels much better.    Physical Exam   Vital Signs: Temp: 97.7  F (36.5  C) Temp src: Oral BP: 98/56 Pulse: 74   Resp: 16 SpO2: 100 % O2 Device: Nasal cannula Oxygen Delivery: 3 LPM  Weight: 91 lbs 14.91 oz    No apparent distress, awake, lungs clear to auscultation anteriorly although limited by  shallow breaths, mild tachypnea when speaking in long sentences, belly soft and nontender, no peripheral edema    Medical Decision Making           Data     I have personally reviewed the following data over the past 24 hrs:    6.6  \   8.6 (L)   / 231     140 99 6.7 (L) /  97   3.9 37 (H) 0.46 (L) \       Imaging results reviewed over the past 24 hrs:   No results found for this or any previous visit (from the past 24 hours).

## 2024-12-23 NOTE — PLAN OF CARE
Goal Outcome Evaluation:      Plan of Care Reviewed With: patient          Outcome Evaluation: Care Management Assessment completed for discharge planning. Please see SW note from today for more inforamtion.

## 2024-12-23 NOTE — CONSULTS
Care Management Initial Consult    General Information  Assessment completed with: Patient, VM-chart review, Vandana  Type of CM/SW Visit: Initial Assessment    Primary Care Provider verified and updated as needed: No   Readmission within the last 30 days: no previous admission in last 30 days      Reason for Consult: discharge planning  Advance Care Planning: Advance Care Planning Reviewed: no concerns identified          Communication Assessment  Patient's communication style: spoken language (English or Bilingual)    Hearing Difficulty or Deaf: no   Wear Glasses or Blind: yes    Cognitive  Cognitive/Neuro/Behavioral: .WDL except, orientation  Level of Consciousness: alert, confused  Arousal Level: opens eyes spontaneously  Orientation: disoriented to, time  Mood/Behavior: calm, cooperative  Best Language: 0 - No aphasia  Speech: clear    Living Environment:   People in home: alone     Current living Arrangements: apartment      Able to return to prior arrangements: yes       Family/Social Support:  Care provided by: self  Provides care for:    Marital Status:   Support system: Children          Description of Support System: Supportive         Current Resources:   Patient receiving home care services:  (unable to assess)        Community Resources: Other (see comment) (unable to assess)  Equipment currently used at home: shower chair, cane, straight, walker, rolling, grab bar, tub/shower  Supplies currently used at home: Other (unable to assess)    Employment/Financial:  Employment Status: other (see comments) (unable to assess)        Financial Concerns: other (see comments) (unable to assess)   Referral to Financial Worker: No       Does the patient's insurance plan have a 3 day qualifying hospital stay waiver?  No    Lifestyle & Psychosocial Needs:  Social Drivers of Health     Food Insecurity: Low Risk  (12/19/2024)    Food Insecurity     Within the past 12 months, did you worry that your food would run  out before you got money to buy more?: No     Within the past 12 months, did the food you bought just not last and you didn t have money to get more?: No   Depression: Not at risk (8/22/2024)    PHQ-2     PHQ-2 Score: 2   Housing Stability: Low Risk  (12/19/2024)    Housing Stability     Do you have housing? : Yes     Are you worried about losing your housing?: No   Tobacco Use: Low Risk  (12/18/2024)    Patient History     Smoking Tobacco Use: Never     Smokeless Tobacco Use: Never     Passive Exposure: Never   Financial Resource Strain: Low Risk  (12/19/2024)    Financial Resource Strain     Within the past 12 months, have you or your family members you live with been unable to get utilities (heat, electricity) when it was really needed?: No   Alcohol Use: Not on file   Transportation Needs: Low Risk  (12/19/2024)    Transportation Needs     Within the past 12 months, has lack of transportation kept you from medical appointments, getting your medicines, non-medical meetings or appointments, work, or from getting things that you need?: No   Physical Activity: Not on file   Interpersonal Safety: Low Risk  (12/19/2024)    Interpersonal Safety     Do you feel physically and emotionally safe where you currently live?: Yes     Within the past 12 months, have you been hit, slapped, kicked or otherwise physically hurt by someone?: No     Within the past 12 months, have you been humiliated or emotionally abused in other ways by your partner or ex-partner?: No   Stress: Not on file   Social Connections: Not on file   Health Literacy: Not on file       Functional Status:  Prior to admission patient needed assistance:   Dependent ADLs:: Ambulation-walker  Dependent IADLs:: Independent       Mental Health Status:  Mental Health Status: Other (see comment) (unable to assess)       Chemical Dependency Status:  Chemical Dependency Status: Other (see comment) (unable to assess)             Values/Beliefs:  Spiritual, Cultural  "Beliefs, Scientology Practices, Values that affect care: other (see comments) (unable to assess)               Discussed  Partnership in Safe Discharge Planning  document with patient/family: No    Additional Information:  Per H&P, \"Vandana Ivey is a 79 year old female with past medical history significant for pulmonary fibrosis with chronic home O22.5 Liters baseline history of meningioma status post resection , hiatal hernia s/p repair, IBS, chronic dizziness, osteoporosis presented from home with progressive dizziness and shortness of breath admitted for sepsis \"     BRETT met with pt at bedside to complete Care Management Assessment for discharge planning. PT/OT currently recommending TCU at discharge. Pt shared that she was not feeling well but opening to briefly discussing dispo planning. Pt confirmed home address but SW unable to confirm PCP or insurance at this time. SW discussed dispo recs for TCU and pt shared that she has been to a TCU in [place] when she used to live there. SW provided pt with Medicare Care Compare list and requested that she choose at least 5 choices for SW to send referrals to. Pt requested that SW also follow up with her daughter (Marcelina) about dispo recs. Pt requested that SW send a referral to [facility] for PT/OT to come to her home. SW explained that TCU is where pt would stay at a short-term facility to work with therapies with the goal to go home. Pt not feeling well so SW excused self from room and confirmed that SW would follow up with pt daughter.     SW attempted to call Marcelina to complete assessment and discuss dispo recs but there was no answer. Automated message identified as Marcelina. SW left VM explaining purpose of call and requesting call back when able.     Next Steps: Follow up with pt and/or daughter re: TCU choices. Coordinate discharge to TCU as appropriate.       BERNADETTE Cordoba  Fl[place]   [facility]   Available via [employer]  Covering 6 Med " Surg SW, ph: [phone]

## 2024-12-23 NOTE — PLAN OF CARE
[facility]: [facility]      Shift: 3227-4682       Pt is A/O X4 with intermitted confusion, continue to be on 3L of O2 due to continuous de stats. SBA with the walker, R thin whole in apple sauce. Incontinent of B/B, sacrum wound dressing changed by previous nurse.Continue with POC.

## 2024-12-24 LAB
ANION GAP SERPL CALCULATED.3IONS-SCNC: 4 MMOL/L (ref 7–15)
BACTERIA BLD CULT: NO GROWTH
BACTERIA BLD CULT: NO GROWTH
BASOPHILS # BLD AUTO: 0.1 10E3/UL (ref 0–0.2)
BASOPHILS NFR BLD AUTO: 1 %
BILIRUB DIRECT SERPL-MCNC: <0.2 MG/DL (ref 0–0.3)
BILIRUB SERPL-MCNC: <0.2 MG/DL
BUN SERPL-MCNC: 8.8 MG/DL (ref 8–23)
CALCIUM SERPL-MCNC: 8.7 MG/DL (ref 8.8–10.4)
CHLORIDE SERPL-SCNC: 95 MMOL/L (ref 98–107)
CREAT SERPL-MCNC: 0.48 MG/DL (ref 0.51–0.95)
EGFRCR SERPLBLD CKD-EPI 2021: >90 ML/MIN/1.73M2
EOSINOPHIL # BLD AUTO: 0.2 10E3/UL (ref 0–0.7)
EOSINOPHIL NFR BLD AUTO: 3 %
ERYTHROCYTE [DISTWIDTH] IN BLOOD BY AUTOMATED COUNT: 16.5 % (ref 10–15)
FERRITIN SERPL-MCNC: 263 NG/ML (ref 11–328)
GLUCOSE SERPL-MCNC: 121 MG/DL (ref 70–99)
HAPTOGLOB SERPL-MCNC: 230 MG/DL (ref 30–200)
HCO3 SERPL-SCNC: 38 MMOL/L (ref 22–29)
HCT VFR BLD AUTO: 31.1 % (ref 35–47)
HGB BLD-MCNC: 9.2 G/DL (ref 11.7–15.7)
IMM GRANULOCYTES # BLD: 0 10E3/UL
IMM GRANULOCYTES NFR BLD: 1 %
IRON BINDING CAPACITY (ROCHE): 134 UG/DL (ref 240–430)
IRON SATN MFR SERPL: 8 % (ref 15–46)
IRON SERPL-MCNC: 11 UG/DL (ref 37–145)
LDH SERPL L TO P-CCNC: 172 U/L (ref 0–250)
LYMPHOCYTES # BLD AUTO: 0.8 10E3/UL (ref 0.8–5.3)
LYMPHOCYTES NFR BLD AUTO: 10 %
MCH RBC QN AUTO: 26 PG (ref 26.5–33)
MCHC RBC AUTO-ENTMCNC: 29.6 G/DL (ref 31.5–36.5)
MCV RBC AUTO: 88 FL (ref 78–100)
MONOCYTES # BLD AUTO: 1.2 10E3/UL (ref 0–1.3)
MONOCYTES NFR BLD AUTO: 16 %
NEUTROPHILS # BLD AUTO: 5.3 10E3/UL (ref 1.6–8.3)
NEUTROPHILS NFR BLD AUTO: 71 %
NRBC # BLD AUTO: 0 10E3/UL
NRBC BLD AUTO-RTO: 0 /100
PATH REPORT.COMMENTS IMP SPEC: NORMAL
PATH REPORT.COMMENTS IMP SPEC: NORMAL
PATH REPORT.FINAL DX SPEC: NORMAL
PATH REPORT.MICROSCOPIC SPEC OTHER STN: NORMAL
PATH REPORT.MICROSCOPIC SPEC OTHER STN: NORMAL
PATH REPORT.RELEVANT HX SPEC: NORMAL
PLATELET # BLD AUTO: 207 10E3/UL (ref 150–450)
POTASSIUM SERPL-SCNC: 4.5 MMOL/L (ref 3.4–5.3)
RBC # BLD AUTO: 3.54 10E6/UL (ref 3.8–5.2)
RETICS # AUTO: 0.04 10E6/UL (ref 0.03–0.1)
RETICS/RBC NFR AUTO: 1.1 % (ref 0.5–2)
SODIUM SERPL-SCNC: 137 MMOL/L (ref 135–145)
TRANSFERRIN SERPL-MCNC: 113 MG/DL (ref 200–360)
WBC # BLD AUTO: 7.5 10E3/UL (ref 4–11)

## 2024-12-24 PROCEDURE — 82728 ASSAY OF FERRITIN: CPT | Performed by: STUDENT IN AN ORGANIZED HEALTH CARE EDUCATION/TRAINING PROGRAM

## 2024-12-24 PROCEDURE — 82247 BILIRUBIN TOTAL: CPT | Performed by: STUDENT IN AN ORGANIZED HEALTH CARE EDUCATION/TRAINING PROGRAM

## 2024-12-24 PROCEDURE — 250N000013 HC RX MED GY IP 250 OP 250 PS 637: Performed by: INTERNAL MEDICINE

## 2024-12-24 PROCEDURE — 83540 ASSAY OF IRON: CPT | Performed by: STUDENT IN AN ORGANIZED HEALTH CARE EDUCATION/TRAINING PROGRAM

## 2024-12-24 PROCEDURE — 85014 HEMATOCRIT: CPT | Performed by: STUDENT IN AN ORGANIZED HEALTH CARE EDUCATION/TRAINING PROGRAM

## 2024-12-24 PROCEDURE — 84520 ASSAY OF UREA NITROGEN: CPT | Performed by: STUDENT IN AN ORGANIZED HEALTH CARE EDUCATION/TRAINING PROGRAM

## 2024-12-24 PROCEDURE — 99233 SBSQ HOSP IP/OBS HIGH 50: CPT

## 2024-12-24 PROCEDURE — 80048 BASIC METABOLIC PNL TOTAL CA: CPT | Performed by: STUDENT IN AN ORGANIZED HEALTH CARE EDUCATION/TRAINING PROGRAM

## 2024-12-24 PROCEDURE — 85060 BLOOD SMEAR INTERPRETATION: CPT | Performed by: STUDENT IN AN ORGANIZED HEALTH CARE EDUCATION/TRAINING PROGRAM

## 2024-12-24 PROCEDURE — 250N000013 HC RX MED GY IP 250 OP 250 PS 637: Performed by: STUDENT IN AN ORGANIZED HEALTH CARE EDUCATION/TRAINING PROGRAM

## 2024-12-24 PROCEDURE — 84466 ASSAY OF TRANSFERRIN: CPT | Performed by: STUDENT IN AN ORGANIZED HEALTH CARE EDUCATION/TRAINING PROGRAM

## 2024-12-24 PROCEDURE — 120N000002 HC R&B MED SURG/OB UMMC

## 2024-12-24 PROCEDURE — 83010 ASSAY OF HAPTOGLOBIN QUANT: CPT | Performed by: STUDENT IN AN ORGANIZED HEALTH CARE EDUCATION/TRAINING PROGRAM

## 2024-12-24 PROCEDURE — 85004 AUTOMATED DIFF WBC COUNT: CPT | Performed by: STUDENT IN AN ORGANIZED HEALTH CARE EDUCATION/TRAINING PROGRAM

## 2024-12-24 PROCEDURE — 85045 AUTOMATED RETICULOCYTE COUNT: CPT | Performed by: STUDENT IN AN ORGANIZED HEALTH CARE EDUCATION/TRAINING PROGRAM

## 2024-12-24 PROCEDURE — 36415 COLL VENOUS BLD VENIPUNCTURE: CPT | Performed by: STUDENT IN AN ORGANIZED HEALTH CARE EDUCATION/TRAINING PROGRAM

## 2024-12-24 PROCEDURE — 82248 BILIRUBIN DIRECT: CPT | Performed by: STUDENT IN AN ORGANIZED HEALTH CARE EDUCATION/TRAINING PROGRAM

## 2024-12-24 PROCEDURE — 83550 IRON BINDING TEST: CPT | Performed by: STUDENT IN AN ORGANIZED HEALTH CARE EDUCATION/TRAINING PROGRAM

## 2024-12-24 PROCEDURE — G0463 HOSPITAL OUTPT CLINIC VISIT: HCPCS

## 2024-12-24 PROCEDURE — 83615 LACTATE (LD) (LDH) ENZYME: CPT | Performed by: STUDENT IN AN ORGANIZED HEALTH CARE EDUCATION/TRAINING PROGRAM

## 2024-12-24 PROCEDURE — 97535 SELF CARE MNGMENT TRAINING: CPT | Mod: GO

## 2024-12-24 PROCEDURE — 250N000013 HC RX MED GY IP 250 OP 250 PS 637

## 2024-12-24 RX ORDER — FERROUS SULFATE 325(65) MG
325 TABLET ORAL DAILY
Status: DISCONTINUED | OUTPATIENT
Start: 2024-12-24 | End: 2024-12-24

## 2024-12-24 RX ORDER — CEFDINIR 300 MG/1
300 CAPSULE ORAL 2 TIMES DAILY
DISCHARGE
Start: 2024-12-24 | End: 2024-12-26

## 2024-12-24 RX ORDER — IPRATROPIUM BROMIDE AND ALBUTEROL SULFATE 2.5; .5 MG/3ML; MG/3ML
3 SOLUTION RESPIRATORY (INHALATION) EVERY 4 HOURS PRN
DISCHARGE
Start: 2024-12-24

## 2024-12-24 RX ORDER — GUAIFENESIN 200 MG/10ML
200 LIQUID ORAL 2 TIMES DAILY PRN
DISCHARGE
Start: 2024-12-24

## 2024-12-24 RX ORDER — PREGABALIN 50 MG/1
50 CAPSULE ORAL 2 TIMES DAILY
Status: SHIPPED | DISCHARGE
Start: 2024-12-24 | End: 2024-12-26

## 2024-12-24 RX ORDER — CEFDINIR 300 MG/1
300 CAPSULE ORAL 2 TIMES DAILY
Status: COMPLETED | OUTPATIENT
Start: 2024-12-24 | End: 2024-12-26

## 2024-12-24 RX ADMIN — DICYCLOMINE HYDROCHLORIDE 10 MG: 10 CAPSULE ORAL at 21:05

## 2024-12-24 RX ADMIN — ATORVASTATIN CALCIUM 40 MG: 40 TABLET, FILM COATED ORAL at 09:00

## 2024-12-24 RX ADMIN — DICYCLOMINE HYDROCHLORIDE 10 MG: 10 CAPSULE ORAL at 13:20

## 2024-12-24 RX ADMIN — Medication 12.5 MG: at 08:59

## 2024-12-24 RX ADMIN — DOXYCYCLINE HYCLATE 100 MG: 100 CAPSULE ORAL at 09:00

## 2024-12-24 RX ADMIN — CETIRIZINE HYDROCHLORIDE 10 MG: 10 TABLET, FILM COATED ORAL at 13:20

## 2024-12-24 RX ADMIN — PREGABALIN 50 MG: 50 CAPSULE ORAL at 09:00

## 2024-12-24 RX ADMIN — MAGNESIUM OXIDE TAB 400 MG (241.3 MG ELEMENTAL MG) 400 MG: 400 (241.3 MG) TAB at 09:00

## 2024-12-24 RX ADMIN — PANTOPRAZOLE SODIUM 40 MG: 40 TABLET, DELAYED RELEASE ORAL at 08:59

## 2024-12-24 RX ADMIN — FERROUS SULFATE TAB 325 MG (65 MG ELEMENTAL FE) 325 MG: 325 (65 FE) TAB at 09:46

## 2024-12-24 RX ADMIN — CEFDINIR 300 MG: 300 CAPSULE ORAL at 21:05

## 2024-12-24 RX ADMIN — FOLIC ACID 1000 MCG: 1 TABLET ORAL at 09:00

## 2024-12-24 RX ADMIN — ASPIRIN 81 MG: 81 TABLET, COATED ORAL at 08:59

## 2024-12-24 RX ADMIN — Medication 1 TABLET: at 09:00

## 2024-12-24 RX ADMIN — MIRTAZAPINE 15 MG: 15 TABLET, FILM COATED ORAL at 21:05

## 2024-12-24 RX ADMIN — CYANOCOBALAMIN TAB 500 MCG 500 MCG: 500 TAB at 09:00

## 2024-12-24 RX ADMIN — Medication 12.5 MG: at 21:05

## 2024-12-24 RX ADMIN — FERROUS SULFATE TAB 325 MG (65 MG ELEMENTAL FE) 325 MG: 325 (65 FE) TAB at 09:00

## 2024-12-24 RX ADMIN — TRAZODONE HYDROCHLORIDE 100 MG: 50 TABLET ORAL at 21:05

## 2024-12-24 RX ADMIN — PANTOPRAZOLE SODIUM 40 MG: 40 TABLET, DELAYED RELEASE ORAL at 17:12

## 2024-12-24 RX ADMIN — PREGABALIN 50 MG: 50 CAPSULE ORAL at 21:05

## 2024-12-24 RX ADMIN — DICYCLOMINE HYDROCHLORIDE 10 MG: 10 CAPSULE ORAL at 09:00

## 2024-12-24 RX ADMIN — POLYETHYLENE GLYCOL 3350 17 G: 17 POWDER, FOR SOLUTION ORAL at 09:01

## 2024-12-24 ASSESSMENT — ACTIVITIES OF DAILY LIVING (ADL)
ADLS_ACUITY_SCORE: 65

## 2024-12-24 NOTE — PLAN OF CARE
Goal Outcome Evaluation:      Plan of Care Reviewed With: patient    Overall Patient Progress: no changeOverall Patient Progress: no change     Discharge plan: TCU       VS: BP 99/55 (BP Location: Left arm)   Pulse 71   Temp 98  F (36.7  C) (Axillary)   Resp 18   Ht 1.524 m (5')   Wt 41.7 kg (91 lb 14.9 oz)   SpO2 94%   BMI 17.95 kg/m       O2: Sating >94% on 2.5 L NC (baseline), denies SOB/Chest pain. Denies N/V. Afebrile this shift.    Output: Incontinent of B/B, purewick in place   Last BM:  bowel sounds normoactive x4   Activity: A/2 with GB/Walker. Very weak.    Up for meals? No   Skin: Sacrum wound. Fragile skin    Pain: Denies pain   CMS: AO x3, Disoriented to time.    Dressing: Mepilex to sacral- dressing change completed    Diet: Regular diet, thin liquids. Eating/drinking improving    LDA: L. PIV/SL   Equipment:  IV pole, personal belongings   Plan: Call light within reach, bed in a low position. Able to make needs known. Continue to monitor with POC.   Additional Info:

## 2024-12-24 NOTE — CONSULTS
[facility]  WO Nurse Inpatient Assessment     Consulted for: coccyx (sacrum)    Summary: Sacrum wound stage 2 present on admit.     Patient History (according to provider note(s):      Vandana Ivey is a 79 year old female with history most notable for pulmonary fibrosis with chronic home O2 of 2.5 L, meningioma status postresection, hiatal hernia s/p repair, IBS, chronic dizziness, osteoporosis who presented from home with progressive dizziness and shortness of breath admitted to [facility] for sepsis.  Workup notable for parainfluenza infection on respiratory viral panel as well as E. coli urinary tract infection and findings concerning for bacterial pneumonia.  Patient transferred from [facility] to [facility] on 12/19.  Patient admitted for acute on chronic respiratory failure secondary to community-acquired pneumonia and parainfluenza infection complicated by E. coli urinary tract infection.     Assessment:      Areas visualized during today's visit: Sacrum/coccyx    Pressure Injury Location: sacrum    Last photo: 12/24  Wound type: Pressure Injury     Pressure Injury Stage: 2, present on admission   Wound history/plan of care:   Present on admit. Pt states this started over a year ago. She is frail with little fat pad over coccyx/sacral region.     Wound base: 100 % Dermis,      Palpation of the wound bed: normal      Drainage: scant     Description of drainage: serous     Measurements (length x width x depth, in cm) 0.5  x 0.3  x  0.2 cm   Periwound skin: Intact and Macerated      Color: pink      Temperature: normal   Odor: none  Pain: denies , none  Pain intervention prior to dressing change: slow and gentle cares   Treatment goal: Heal   STATUS: initial assessment  Supplies ordered: discussed with RN and discussed with patient     My PI Risk Assessment     Sensory Perception: 3 - Slightly Limited     Moisture: 3 - Occasionally moist       Activity: 3 - Walks occasionally      Mobility: 3 - Slightly limited      Nutrition: 3 - Adequate     Friction/Shear: 2 - Potential problem      TOTAL: 18       Treatment Plan:     Sacrum wound: Every 3 days   Cleanse the area with NS and pat dry.  Apply No sting film barrier to periwound skin.  Cover wound with Sacral Mepilex (#[id number])  DO NOT apply barrier cream under mepilex.  Change dressing Q 3 days.  Turn and reposition Q 2hrs side to side only.  Ensure pt has Nigel-cushion while sitting up in the chair.  Ensure air pump on bed is turned on and set to isoflex.     FYI- If pt has constant incontinent loose stools needing dressing changes Q shift please discontinue the Mepilex dressing and apply criticaid barrier paste BID and PRN.        Orders: Written    RECOMMEND PRIMARY TEAM ORDER: None, at this time  Education provided: importance of repositioning, plan of care, and wound progress  Discussed plan of care with: Patient and Nurse  WOC nurse follow-up plan: weekly  Notify WOC if wound(s) deteriorate.  Nursing to notify the Provider(s) and re-consult the WOC Nurse if new skin concern.    DATA:     Current support surface: Standard  Low air loss (GABRIEL pump, Isolibrium, Pulsate)  Containment of urine/stool: Incontinence Protocol  BMI: Body mass index is 17.95 kg/m .   Active diet order: Orders Placed This Encounter      Regular Diet Adult     Output: I/O last 3 completed shifts:  In: 370 [P.O.:360; I.V.:10]  Out: -      Labs:   Recent Labs   Lab 12/24/24  0620 12/23/24  1319 12/18/24  2302 12/18/24  2109   ALBUMIN  --  2.5*  --   --    HGB 9.2*  --    < >  --    INR  --   --   --  0.98   WBC 7.5  --    < >  --     < > = values in this interval not displayed.     Pressure injury risk assessment:   Sensory Perception: 4-->no impairment  Moisture: 3-->occasionally moist  Activity: 3-->walks occasionally  Mobility: 3-->slightly limited  Nutrition: 3-->adequate  Friction and Shear: 2-->potential problem  Britton Score:  18    Aleyda Marie RN CWOCN  Pager no longer is use, please contact through [employer] group: [facility] Nurse [facility]  Dept. Office Number: [phone]

## 2024-12-24 NOTE — CARE PLAN
Shift: 0700 - 1930  /73 (BP Location: Right arm)   Pulse 67   Temp 98.2  F (36.8  C) (Axillary)   Resp 17   Ht 1.524 m (5')   Wt 41.7 kg (91 lb 14.9 oz)   SpO2 100%   BMI 17.95 kg/m      Patient is alert and oriented with intermittent confusion and generalized weakness. Pt was more fatigue at the beginning of shift and later expressed feeling depressed in the afternoon because she felt better yesterday and woke up today feeling tired and weaker. Provider ordered labs; venous PCO2 was 82.  VSS, Denies shortness of breath,chest pain, nausea/vomiting, no fever or chills. Pt denies pain, and feeling much better this evening  with good appetite. Pt is able to make needs know, call light is in reach, continue with POC.

## 2024-12-24 NOTE — PROGRESS NOTES
Care Management Follow Up    Length of Stay (days): 6    Expected Discharge Date: 12/26/24     Concerns to be Addressed: Discharge Planning       Patient plan of care discussed at interdisciplinary rounds: Yes    Anticipated Discharge Disposition: Transitional Care    [facility]  [address]  [place] [address]     Anticipated Discharge Services: Post Acute Therapies    Anticipated Discharge DME: None    Patient/family educated on Medicare website which has current facility and service quality ratings: Yes    Education Provided on the Discharge Plan: Yes    Patient/Family in Agreement with the Plan: Yes    Referrals Placed by CM/SW: TCU      Private pay costs discussed: Not applicable    Discussed  Partnership in Safe Discharge Planning  document with patient/family: No     Handoff Completed: Yes, [facility] PCP: Internal handoff referral completed    Additional Information:    Writer spoke with [facility] TCU admissions, Lorraine Almaraz who reported that patient had been accepted for admission. Writer spoke with patient and daughter dean to update them. Writer left to set up discharge.    Writer completed PAS: [id number]. Writer received call back from [facility] admissions reporting that the discharge would need to be delayed as there was a sickness at the TCU. Writer updated MD, patient and patient's daughter. WADE 12/26.     Next Steps:     IMM  Discharge note  Ensure discharge summary is completed    GITA Freeman, FIDENCIO  6th Floor Medical Surgical Unit  Phone [phone]      Message me securely in [employer]

## 2024-12-24 NOTE — PROGRESS NOTES
[facility]    Medicine Progress Note - Hospitalist Service, GOLD TEAM 21    Date of Admission:  12/18/2024    Assessment & Plan   Vandana Ivey is a 79 year old female with history most notable for pulmonary fibrosis with chronic home O2 of 2.5 L, meningioma status postresection, hiatal hernia s/p repair, IBS, chronic dizziness, osteoporosis who presented from home with progressive dizziness and shortness of breath admitted to [facility] for sepsis.  Workup notable for parainfluenza infection on respiratory viral panel as well as E. coli urinary tract infection and findings concerning for bacterial pneumonia.  Patient transferred from [facility] to [facility] on 12/19.  Patient admitted for acute on chronic respiratory failure secondary to community-acquired pneumonia and parainfluenza infection complicated by E. coli urinary tract infection.      Today :  -patient was switched to oral cefdinir to complete total 7 day course   -patient was stable for discharge but [facility] has a flu out break and hence not accepting patient     # Acute on chronic respiratory failure secondary to community-acquired pneumonia and parainfluenza infection  #Pulmonary fibrosis  Baseline oxygen need of 2 to 3 L at home.  Initial VBG with significant CO2 retention of chronic pattern.  Unclear baseline as no prior gas in system.  Follows with pulmonary-last clinic date 11/19 still under workup for ILD.  Presented with progressive sore shortness of breath with vague for progressive dizziness.  Echo-remarkable for severe left atrial enlargement, mild pulmonary hypertension LV function and global right ventricular function being normal with mild enlargement.  Imaging concerning for possible pneumonia.  Parainfluenza positive.  As of 12/22, back to baseline O2 needs.  Isolated fever on 12/21; no fever since.  - switch CTX> cefdinir to finish 7 day course   -Will have low threshold  "to consult from pulmonology if respiratory issues    #E. coli urinary tract infection  Patient presented with progressive shortness of breath with vague but progressive dizziness.  Lactic acid within normal limits.  UA consistent with infectious with subsequent culture growing greater than 100,000 E. coli.  Reports significant urinary frequency prior to admission.  Patient has shown clinical improvement since the start of antibiotics.  Urine culture susceptible to third-generation beta-lactam's.  - switch CTX> cefdinir to finish 7 day course   -Follow-up blood cultures, no growth to date      #Encephalopathy likely secondary to hospital delirium (resolved)  Patient having intermittent confusion on 12/22.  On 12/23, patient somnolent for a portion of the late morning.  VBG remarkable for pCO2 of 82 but well compensated with a pH within normal range.  Discussed with the pulmonology provider who agrees that this would not account for her mental status change.  Patient perked up when daughter arrived in the afternoon.  - Delirium precautions, continue to monitor    #Choking episode  On 12/21, patient choked on a pill during medication administration with nurse at bedside.  Nurse performed Heimlich dislodging the pill.  Patient returned to baseline respiratory status following incident.    #Goals of care  Per admitting provider-\"Patient reports she wants to think about it if she wants to be full code or DNR.  Will order full code for now.  Given pulmonary fibrosis and progressive hypercapnia and hypoxia.  I am worried that she might require intubation during this hospitalization.  Patient is also more keen towards going home than any prolonged hospitalization or rehab.  Daughter is aware and understands. Patient is currently able to take her own medication at home and lives independently, gets Meals on Wheels \"  on 12/20-I had a long discussion with daughter over the phone (see additional note on same day).  She later " discussed with patient and ultimate decision was made by patient for CODE STATUS to be changed to DNR/DNI.  -PT/OT-recommending TCU  -Speech assessment - Recommend continue current regular diet and thin liquids with patient self-selecting softer solids.     #Mild hyperkalemia-potassium of 3.2 on 12/21.  Will replete with oral liquid solution and recheck in AM.    #Polypharmacy  Reviewed for med rec, will restart pta  Lyrica Remeron and trazodone    Chronic Conditions:  Osteoporosis without current pathological fracture, unspecified osteoporosis type  Hiatal hernia  Gastroesophageal reflux disease   Dizziness  Benign paroxysmal positional vertigo, unspecified laterality  Meningioma (H) s/p resection.   Insomnia,   Irritable bowel syndrome with constipation  Gastroparesis          Diet: Regular Diet Adult  Snacks/Supplements Adult: Ensure Enlive; With Meals    DVT Prophylaxis: Pneumatic Compression Devices  Burns Catheter: Not present  Lines: None     Cardiac Monitoring: None  Code Status: No CPR- Do NOT Intubate      Clinically Significant Risk Factors          # Hypochloremia: Lowest Cl = 95 mmol/L in last 2 days, will monitor as appropriate      # Hypoalbuminemia: Lowest albumin = 2.5 g/dL at 12/23/2024  1:19 PM, will monitor as appropriate                # Cachexia: Estimated body mass index is 17.95 kg/m  as calculated from the following:    Height as of this encounter: 1.524 m (5').    Weight as of this encounter: 41.7 kg (91 lb 14.9 oz).      # Financial/Environmental Concerns: other (see comments) (unable to assess)         Social Drivers of Health            Disposition Plan     Medically Ready for Discharge: Anticipated Today             Nakul Brock MD  Hospitalist Service, GOLD TEAM [address] [place] [address]  Securely message with Super (more info)  Text page via [facility] Paging/Directory   See signed in provider for up to date coverage  information  ______________________________________________________________________    Interval History   No acute overnight events     Physical Exam   Vital Signs: Temp: 98  F (36.7  C) Temp src: Axillary BP: 99/55 Pulse: 71   Resp: 18 SpO2: 94 % O2 Device: Nasal cannula Oxygen Delivery: 2.5 LPM  Weight: 91 lbs 14.91 oz    Constitutional: awake, appears tired, cooperative, no apparent distress, and appears stated age  Respiratory: No increased work of breathing, good air exchange, clear to auscultation bilaterally, no crackles or wheezing  Cardiovascular: Normal apical impulse, regular rate and rhythm, normal S1 and S2, no S3 or S4, and no murmur noted  GI: No scars, normal bowel sounds, soft, non-distended, non-tender, no masses palpated, no hepatosplenomegally    Medical Decision Making       50 MINUTES SPENT BY ME on the date of service doing chart review, history, exam, documentation & further activities per the note.      Data     I have personally reviewed the following data over the past 24 hrs:    7.5  \   9.2 (L)   / 207     137 95 (L) 8.8 /  121 (H)   4.5 38 (H) 0.48 (L) \     ALT: N/A AST: N/A AP: N/A TBILI: <0.2   ALB: N/A TOT PROTEIN: N/A LIPASE: N/A     Ferritin:  263 % Retic:  1.1 LDH:  172       Imaging results reviewed over the past 24 hrs:   No results found for this or any previous visit (from the past 24 hours).

## 2024-12-24 NOTE — PLAN OF CARE
[id number]    Goal Outcome Evaluation:      Plan of Care Reviewed With: patient    Overall Patient Progress: no changeOverall Patient Progress: no change    Patient is A&O x4. Intermittent confusion noted. Able to make needs known. Not OOB during the shift. Incontinent of bowel and bladder. No BM this shift.     On continous O2 at 3LPM via NC with humidification. Regular diet. Needs assistance ordering food. Pills crush to applesauce. L PIV, SL. Denies pain and other discomfort. WOC consult placed for wound on sacrum/coccyx area. Barrier cream and mepilex applied. Isoflex/GABRIEL connected to bed to promote weight shifting.     2020- Critical lab result: PCO2 Venous 79. Provider aware. Down trending from 82.    Contact and droplet precaution maintained. Continue with POC.     /73 (BP Location: Right arm)   Pulse 71   Temp 98.6  F (37  C) (Axillary)   Resp 16   Ht 1.524 m (5')   Wt 41.7 kg (91 lb 14.9 oz)   SpO2 100%   BMI 17.95 kg/m        Hermilo Mcginnis RN on 12/24/2024

## 2024-12-25 LAB
ANION GAP SERPL CALCULATED.3IONS-SCNC: 3 MMOL/L (ref 7–15)
BASOPHILS # BLD AUTO: 0.1 10E3/UL (ref 0–0.2)
BASOPHILS NFR BLD AUTO: 1 %
BUN SERPL-MCNC: 11.2 MG/DL (ref 8–23)
CALCIUM SERPL-MCNC: 8.7 MG/DL (ref 8.8–10.4)
CHLORIDE SERPL-SCNC: 98 MMOL/L (ref 98–107)
CREAT SERPL-MCNC: 0.46 MG/DL (ref 0.51–0.95)
EGFRCR SERPLBLD CKD-EPI 2021: >90 ML/MIN/1.73M2
EOSINOPHIL # BLD AUTO: 0.2 10E3/UL (ref 0–0.7)
EOSINOPHIL NFR BLD AUTO: 4 %
ERYTHROCYTE [DISTWIDTH] IN BLOOD BY AUTOMATED COUNT: 16.7 % (ref 10–15)
GLUCOSE SERPL-MCNC: 108 MG/DL (ref 70–99)
HCO3 SERPL-SCNC: 38 MMOL/L (ref 22–29)
HCT VFR BLD AUTO: 29.1 % (ref 35–47)
HGB BLD-MCNC: 8.8 G/DL (ref 11.7–15.7)
IMM GRANULOCYTES # BLD: 0 10E3/UL
IMM GRANULOCYTES NFR BLD: 1 %
LYMPHOCYTES # BLD AUTO: 1 10E3/UL (ref 0.8–5.3)
LYMPHOCYTES NFR BLD AUTO: 16 %
MAGNESIUM SERPL-MCNC: 1.9 MG/DL (ref 1.7–2.3)
MCH RBC QN AUTO: 26 PG (ref 26.5–33)
MCHC RBC AUTO-ENTMCNC: 30.2 G/DL (ref 31.5–36.5)
MCV RBC AUTO: 86 FL (ref 78–100)
MONOCYTES # BLD AUTO: 1.1 10E3/UL (ref 0–1.3)
MONOCYTES NFR BLD AUTO: 16 %
NEUTROPHILS # BLD AUTO: 4.1 10E3/UL (ref 1.6–8.3)
NEUTROPHILS NFR BLD AUTO: 63 %
NRBC # BLD AUTO: 0 10E3/UL
NRBC BLD AUTO-RTO: 0 /100
PHOSPHATE SERPL-MCNC: 3 MG/DL (ref 2.5–4.5)
PLATELET # BLD AUTO: 201 10E3/UL (ref 150–450)
POTASSIUM SERPL-SCNC: 4.8 MMOL/L (ref 3.4–5.3)
RBC # BLD AUTO: 3.39 10E6/UL (ref 3.8–5.2)
SODIUM SERPL-SCNC: 139 MMOL/L (ref 135–145)
WBC # BLD AUTO: 6.4 10E3/UL (ref 4–11)

## 2024-12-25 PROCEDURE — 36415 COLL VENOUS BLD VENIPUNCTURE: CPT

## 2024-12-25 PROCEDURE — 250N000013 HC RX MED GY IP 250 OP 250 PS 637: Performed by: INTERNAL MEDICINE

## 2024-12-25 PROCEDURE — 85018 HEMOGLOBIN: CPT

## 2024-12-25 PROCEDURE — 99232 SBSQ HOSP IP/OBS MODERATE 35: CPT

## 2024-12-25 PROCEDURE — 84100 ASSAY OF PHOSPHORUS: CPT

## 2024-12-25 PROCEDURE — 85004 AUTOMATED DIFF WBC COUNT: CPT

## 2024-12-25 PROCEDURE — 120N000002 HC R&B MED SURG/OB UMMC

## 2024-12-25 PROCEDURE — 80048 BASIC METABOLIC PNL TOTAL CA: CPT

## 2024-12-25 PROCEDURE — 83735 ASSAY OF MAGNESIUM: CPT

## 2024-12-25 PROCEDURE — 250N000013 HC RX MED GY IP 250 OP 250 PS 637: Performed by: STUDENT IN AN ORGANIZED HEALTH CARE EDUCATION/TRAINING PROGRAM

## 2024-12-25 PROCEDURE — 250N000013 HC RX MED GY IP 250 OP 250 PS 637

## 2024-12-25 RX ADMIN — MAGNESIUM OXIDE TAB 400 MG (241.3 MG ELEMENTAL MG) 400 MG: 400 (241.3 MG) TAB at 08:43

## 2024-12-25 RX ADMIN — FERROUS SULFATE TAB 325 MG (65 MG ELEMENTAL FE) 325 MG: 325 (65 FE) TAB at 08:43

## 2024-12-25 RX ADMIN — FOLIC ACID 1000 MCG: 1 TABLET ORAL at 08:43

## 2024-12-25 RX ADMIN — DICYCLOMINE HYDROCHLORIDE 10 MG: 10 CAPSULE ORAL at 08:43

## 2024-12-25 RX ADMIN — PREGABALIN 50 MG: 50 CAPSULE ORAL at 21:47

## 2024-12-25 RX ADMIN — DICYCLOMINE HYDROCHLORIDE 10 MG: 10 CAPSULE ORAL at 13:50

## 2024-12-25 RX ADMIN — PREGABALIN 50 MG: 50 CAPSULE ORAL at 08:43

## 2024-12-25 RX ADMIN — Medication 12.5 MG: at 08:43

## 2024-12-25 RX ADMIN — ATORVASTATIN CALCIUM 40 MG: 40 TABLET, FILM COATED ORAL at 08:43

## 2024-12-25 RX ADMIN — CEFDINIR 300 MG: 300 CAPSULE ORAL at 21:47

## 2024-12-25 RX ADMIN — POLYETHYLENE GLYCOL 3350 17 G: 17 POWDER, FOR SOLUTION ORAL at 08:43

## 2024-12-25 RX ADMIN — DICYCLOMINE HYDROCHLORIDE 10 MG: 10 CAPSULE ORAL at 21:48

## 2024-12-25 RX ADMIN — ASPIRIN 81 MG: 81 TABLET, COATED ORAL at 08:43

## 2024-12-25 RX ADMIN — CETIRIZINE HYDROCHLORIDE 10 MG: 10 TABLET, FILM COATED ORAL at 13:49

## 2024-12-25 RX ADMIN — TRAZODONE HYDROCHLORIDE 100 MG: 50 TABLET ORAL at 21:47

## 2024-12-25 RX ADMIN — PANTOPRAZOLE SODIUM 40 MG: 40 TABLET, DELAYED RELEASE ORAL at 15:52

## 2024-12-25 RX ADMIN — CYANOCOBALAMIN TAB 500 MCG 500 MCG: 500 TAB at 08:43

## 2024-12-25 RX ADMIN — Medication 12.5 MG: at 21:47

## 2024-12-25 RX ADMIN — MIRTAZAPINE 15 MG: 15 TABLET, FILM COATED ORAL at 21:47

## 2024-12-25 RX ADMIN — PANTOPRAZOLE SODIUM 40 MG: 40 TABLET, DELAYED RELEASE ORAL at 08:43

## 2024-12-25 RX ADMIN — CEFDINIR 300 MG: 300 CAPSULE ORAL at 08:43

## 2024-12-25 RX ADMIN — Medication 1 TABLET: at 08:43

## 2024-12-25 ASSESSMENT — ACTIVITIES OF DAILY LIVING (ADL)
ADLS_ACUITY_SCORE: 61
ADLS_ACUITY_SCORE: 61
ADLS_ACUITY_SCORE: 68
ADLS_ACUITY_SCORE: 61
ADLS_ACUITY_SCORE: 68
ADLS_ACUITY_SCORE: 61
ADLS_ACUITY_SCORE: 68
ADLS_ACUITY_SCORE: 65
ADLS_ACUITY_SCORE: 61
ADLS_ACUITY_SCORE: 68
ADLS_ACUITY_SCORE: 61
ADLS_ACUITY_SCORE: 68
ADLS_ACUITY_SCORE: 65

## 2024-12-25 NOTE — PLAN OF CARE
[id number]    Goal Outcome Evaluation:      Plan of Care Reviewed With: patient    Overall Patient Progress: no changeOverall Patient Progress: no change      Patient is A&Ox4. Intermittent confusion noted. Able to make needs known. Not OOB during the shift. Purewick in place, draining clear yellow urine. LBM: 12/25.      On continous O2 at 2.5LPM via NC with humidification. Regular diet. Needs assistance ordering food. Pills crush to applesauce. L PIV, SL. Denies pain and other discomfort. Mepilex on sacrum/coccyx area, CDI. Air pump added to mattress to promote weight shifting.    Contact and droplet precaution maintained. Continue with POC.     /57 (BP Location: Left arm, Patient Position: Right side, Cuff Size: Adult Small)   Pulse 77   Temp 99.2  F (37.3  C) (Axillary)   Resp 17   Ht 1.524 m (5')   Wt 41.7 kg (91 lb 14.9 oz)   SpO2 94%   BMI 17.95 kg/m      Hermilo Mcginnis RN on 12/25/2024 at 6:50 AM

## 2024-12-25 NOTE — PROGRESS NOTES
[facility]    Medicine Progress Note - Hospitalist Service, GOLD TEAM 21    Date of Admission:  12/18/2024    Assessment & Plan   Vandana Ivey is a 79 year old female with history most notable for pulmonary fibrosis with chronic home O2 of 2.5 L, meningioma status postresection, hiatal hernia s/p repair, IBS, chronic dizziness, osteoporosis who presented from home with progressive dizziness and shortness of breath admitted to [facility] for sepsis.  Workup notable for parainfluenza infection on respiratory viral panel as well as E. coli urinary tract infection and findings concerning for bacterial pneumonia.  Patient transferred from [facility] to [facility] on 12/19.  Patient admitted for acute on chronic respiratory failure secondary to community-acquired pneumonia and parainfluenza infection complicated by E. coli urinary tract infection.      Today :  -patient was switched to oral cefdinir to complete total 7 day course   -patient was stable for discharge but [facility] has a flu out break and hence not accepting patient   -patient will likely be discharged tomorrow     # Acute on chronic respiratory failure secondary to community-acquired pneumonia and parainfluenza infection  #Pulmonary fibrosis  Baseline oxygen need of 2 to 3 L at home.  Initial VBG with significant CO2 retention of chronic pattern.  Unclear baseline as no prior gas in system.  Follows with pulmonary-last clinic date 11/19 still under workup for ILD.  Presented with progressive sore shortness of breath with vague for progressive dizziness.  Echo-remarkable for severe left atrial enlargement, mild pulmonary hypertension LV function and global right ventricular function being normal with mild enlargement.  Imaging concerning for possible pneumonia.  Parainfluenza positive.  As of 12/22, back to baseline O2 needs.  Isolated fever on 12/21; no fever since.  - switch CTX> cefdinir to  "finish 7 day course   -Will have low threshold to consult from pulmonology if respiratory issues    #E. coli urinary tract infection  Patient presented with progressive shortness of breath with vague but progressive dizziness.  Lactic acid within normal limits.  UA consistent with infectious with subsequent culture growing greater than 100,000 E. coli.  Reports significant urinary frequency prior to admission.  Patient has shown clinical improvement since the start of antibiotics.  Urine culture susceptible to third-generation beta-lactam's.  - switch CTX> cefdinir to finish 7 day course   -Follow-up blood cultures, no growth to date      #Encephalopathy likely secondary to hospital delirium (resolved)  Patient having intermittent confusion on 12/22.  On 12/23, patient somnolent for a portion of the late morning.  VBG remarkable for pCO2 of 82 but well compensated with a pH within normal range.  Discussed with the pulmonology provider who agrees that this would not account for her mental status change.  Patient perked up when daughter arrived in the afternoon.  - Delirium precautions, continue to monitor    #Choking episode  On 12/21, patient choked on a pill during medication administration with nurse at bedside.  Nurse performed Heimlich dislodging the pill.  Patient returned to baseline respiratory status following incident.    #Goals of care  Per admitting provider-\"Patient reports she wants to think about it if she wants to be full code or DNR.  Will order full code for now.  Given pulmonary fibrosis and progressive hypercapnia and hypoxia.  I am worried that she might require intubation during this hospitalization.  Patient is also more keen towards going home than any prolonged hospitalization or rehab.  Daughter is aware and understands. Patient is currently able to take her own medication at home and lives independently, gets Meals on Wheels \"  on 12/20-I had a long discussion with daughter over the phone " (see additional note on same day).  She later discussed with patient and ultimate decision was made by patient for CODE STATUS to be changed to DNR/DNI.  -PT/OT-recommending TCU  -Speech assessment - Recommend continue current regular diet and thin liquids with patient self-selecting softer solids.     #Mild hyperkalemia-potassium of 3.2 on 12/21.  Will replete with oral liquid solution and recheck in AM.    #Polypharmacy  Reviewed for med rec, will restart pta  Lyrica Remeron and trazodone    Chronic Conditions:  Osteoporosis without current pathological fracture, unspecified osteoporosis type  Hiatal hernia  Gastroesophageal reflux disease   Dizziness  Benign paroxysmal positional vertigo, unspecified laterality  Meningioma (H) s/p resection.   Insomnia,   Irritable bowel syndrome with constipation  Gastroparesis          Diet: Regular Diet Adult  Snacks/Supplements Adult: Ensure Enlive; With Meals    DVT Prophylaxis: Pneumatic Compression Devices  Burns Catheter: Not present  Lines: None     Cardiac Monitoring: None  Code Status: No CPR- Do NOT Intubate      Clinically Significant Risk Factors          # Hypochloremia: Lowest Cl = 95 mmol/L in last 2 days, will monitor as appropriate      # Hypoalbuminemia: Lowest albumin = 2.5 g/dL at 12/23/2024  1:19 PM, will monitor as appropriate                # Cachexia: Estimated body mass index is 17.95 kg/m  as calculated from the following:    Height as of this encounter: 1.524 m (5').    Weight as of this encounter: 41.7 kg (91 lb 14.9 oz).        # Financial/Environmental Concerns: other (see comments) (unable to assess)         Social Drivers of Health            Disposition Plan     Medically Ready for Discharge: Anticipated Today             Nakul Brock MD  Hospitalist Service, GOLD TEAM [address] [place] [address]  Securely message with [employer] (more info)  Text page via [employer] Paging/Directory   See signed in provider for up  to date coverage information  ______________________________________________________________________    Interval History   No acute overnight events     Physical Exam   Vital Signs: Temp: 98.4  F (36.9  C) Temp src: Oral BP: 102/59 Pulse: 68   Resp: 16 SpO2: 94 % O2 Device: None (Room air) Oxygen Delivery: 2.5 LPM  Weight: 91 lbs 14.91 oz    Constitutional: awake, appears tired, cooperative, no apparent distress, and appears stated age  Respiratory: No increased work of breathing, good air exchange, clear to auscultation bilaterally, no crackles or wheezing  Cardiovascular: Normal apical impulse, regular rate and rhythm, normal S1 and S2, no S3 or S4, and no murmur noted  GI: No scars, normal bowel sounds, soft, non-distended, non-tender, no masses palpated, no hepatosplenomegally    Medical Decision Making       50 MINUTES SPENT BY ME on the date of service doing chart review, history, exam, documentation & further activities per the note.      Data     I have personally reviewed the following data over the past 24 hrs:    6.4  \   8.8 (L)   / 201     139 98 11.2 /  108 (H)   4.8 38 (H) 0.46 (L) \       Imaging results reviewed over the past 24 hrs:   No results found for this or any previous visit (from the past 24 hours).

## 2024-12-25 NOTE — PLAN OF CARE
[id number]    Goal Outcome Evaluation:      Plan of Care Reviewed With: patient    Overall Patient Progress: no changeOverall Patient Progress: no change     Discharge plan: Awaiting for TCU placement      VS: BP 94/51 (BP Location: Left arm)   Pulse 73   Temp 98.7  F (37.1  C) (Oral)   Resp 18   Ht 1.524 m (5')   Wt 41.7 kg (91 lb 14.9 oz)   SpO2 97%   BMI 17.95 kg/m       O2: Sating >94% on 2.5 L NC (baseline), denies SOB/Chest pain. Denies N/V. Afebrile this shift.    Output: Incontinent of B/B, purewick in place   Last BM:  12/25, bowel sounds normoactive x4   Activity: A/2 with GB/Walker. Up to the recliner this shift.    Up for meals? No   Skin: Sacrum wound. Fragile skin    Pain: Denies pain   CMS: AO x3, Disoriented to time. Forgetful    Dressing: Mepilex to sacral- dressing change completed today   Diet: Regular diet, thin liquids. Eating/drinking improving    LDA: L. PIV/SL   Equipment:  IV pole, personal belongings   Plan: Call light within reach, bed in a low position. Able to make needs known. Continue to monitor with POC.   Additional Info:

## 2024-12-26 VITALS
SYSTOLIC BLOOD PRESSURE: 95 MMHG | WEIGHT: 91.93 LBS | TEMPERATURE: 97.6 F | RESPIRATION RATE: 16 BRPM | HEART RATE: 74 BPM | DIASTOLIC BLOOD PRESSURE: 58 MMHG | BODY MASS INDEX: 18.05 KG/M2 | HEIGHT: 60 IN | OXYGEN SATURATION: 98 %

## 2024-12-26 PROBLEM — J96.21 ACUTE ON CHRONIC RESPIRATORY FAILURE WITH HYPOXIA (H): Status: RESOLVED | Noted: 2024-12-26 | Resolved: 2024-12-26

## 2024-12-26 PROBLEM — A41.9 SEPSIS, DUE TO UNSPECIFIED ORGANISM, UNSPECIFIED WHETHER ACUTE ORGAN DYSFUNCTION PRESENT (H): Status: RESOLVED | Noted: 2024-12-18 | Resolved: 2024-12-26

## 2024-12-26 PROBLEM — J96.21 ACUTE ON CHRONIC RESPIRATORY FAILURE WITH HYPOXIA (H): Status: ACTIVE | Noted: 2024-12-26

## 2024-12-26 PROBLEM — B34.8 PARAINFLUENZA: Status: ACTIVE | Noted: 2024-12-26

## 2024-12-26 PROBLEM — J84.112 IPF (IDIOPATHIC PULMONARY FIBROSIS) (H): Status: ACTIVE | Noted: 2024-12-26

## 2024-12-26 PROBLEM — D50.8 IRON DEFICIENCY ANEMIA SECONDARY TO INADEQUATE DIETARY IRON INTAKE: Status: ACTIVE | Noted: 2024-12-26

## 2024-12-26 PROBLEM — N39.0 UTI (URINARY TRACT INFECTION): Status: RESOLVED | Noted: 2024-12-18 | Resolved: 2024-12-26

## 2024-12-26 PROBLEM — R53.1 GENERALIZED WEAKNESS: Status: ACTIVE | Noted: 2024-12-26

## 2024-12-26 LAB
ANION GAP SERPL CALCULATED.3IONS-SCNC: 6 MMOL/L (ref 7–15)
BASOPHILS # BLD AUTO: 0 10E3/UL (ref 0–0.2)
BASOPHILS NFR BLD AUTO: 1 %
BUN SERPL-MCNC: 12.6 MG/DL (ref 8–23)
CALCIUM SERPL-MCNC: 9.1 MG/DL (ref 8.8–10.4)
CHLORIDE SERPL-SCNC: 95 MMOL/L (ref 98–107)
CREAT SERPL-MCNC: 0.44 MG/DL (ref 0.51–0.95)
EGFRCR SERPLBLD CKD-EPI 2021: >90 ML/MIN/1.73M2
EOSINOPHIL # BLD AUTO: 0.2 10E3/UL (ref 0–0.7)
EOSINOPHIL NFR BLD AUTO: 3 %
ERYTHROCYTE [DISTWIDTH] IN BLOOD BY AUTOMATED COUNT: 16.7 % (ref 10–15)
GLUCOSE SERPL-MCNC: 102 MG/DL (ref 70–99)
HCO3 SERPL-SCNC: 36 MMOL/L (ref 22–29)
HCT VFR BLD AUTO: 30.5 % (ref 35–47)
HGB BLD-MCNC: 9.2 G/DL (ref 11.7–15.7)
IMM GRANULOCYTES # BLD: 0 10E3/UL
IMM GRANULOCYTES NFR BLD: 0 %
LYMPHOCYTES # BLD AUTO: 0.9 10E3/UL (ref 0.8–5.3)
LYMPHOCYTES NFR BLD AUTO: 10 %
MCH RBC QN AUTO: 25.7 PG (ref 26.5–33)
MCHC RBC AUTO-ENTMCNC: 30.2 G/DL (ref 31.5–36.5)
MCV RBC AUTO: 85 FL (ref 78–100)
MONOCYTES # BLD AUTO: 1.3 10E3/UL (ref 0–1.3)
MONOCYTES NFR BLD AUTO: 15 %
NEUTROPHILS # BLD AUTO: 6 10E3/UL (ref 1.6–8.3)
NEUTROPHILS NFR BLD AUTO: 71 %
NRBC # BLD AUTO: 0 10E3/UL
NRBC BLD AUTO-RTO: 0 /100
PLATELET # BLD AUTO: 196 10E3/UL (ref 150–450)
POTASSIUM SERPL-SCNC: 4.3 MMOL/L (ref 3.4–5.3)
RBC # BLD AUTO: 3.58 10E6/UL (ref 3.8–5.2)
SODIUM SERPL-SCNC: 137 MMOL/L (ref 135–145)
WBC # BLD AUTO: 8.4 10E3/UL (ref 4–11)

## 2024-12-26 PROCEDURE — 250N000013 HC RX MED GY IP 250 OP 250 PS 637: Performed by: INTERNAL MEDICINE

## 2024-12-26 PROCEDURE — 85025 COMPLETE CBC W/AUTO DIFF WBC: CPT

## 2024-12-26 PROCEDURE — 80048 BASIC METABOLIC PNL TOTAL CA: CPT

## 2024-12-26 PROCEDURE — 36415 COLL VENOUS BLD VENIPUNCTURE: CPT

## 2024-12-26 PROCEDURE — 258N000003 HC RX IP 258 OP 636

## 2024-12-26 PROCEDURE — 250N000011 HC RX IP 250 OP 636

## 2024-12-26 PROCEDURE — 250N000013 HC RX MED GY IP 250 OP 250 PS 637: Performed by: STUDENT IN AN ORGANIZED HEALTH CARE EDUCATION/TRAINING PROGRAM

## 2024-12-26 PROCEDURE — 250N000013 HC RX MED GY IP 250 OP 250 PS 637

## 2024-12-26 PROCEDURE — 99239 HOSP IP/OBS DSCHRG MGMT >30: CPT

## 2024-12-26 RX ORDER — ASPIRIN 81 MG/1
81 TABLET ORAL DAILY
Status: CANCELLED | OUTPATIENT
Start: 2024-12-27

## 2024-12-26 RX ORDER — AMOXICILLIN 250 MG
1 CAPSULE ORAL 2 TIMES DAILY PRN
Status: CANCELLED | OUTPATIENT
Start: 2024-12-26

## 2024-12-26 RX ORDER — MECLIZINE HCL 12.5 MG 12.5 MG/1
12.5 TABLET ORAL 3 TIMES DAILY PRN
Status: CANCELLED | OUTPATIENT
Start: 2024-12-26

## 2024-12-26 RX ORDER — PANTOPRAZOLE SODIUM 40 MG/1
40 TABLET, DELAYED RELEASE ORAL
Status: CANCELLED | OUTPATIENT
Start: 2024-12-26

## 2024-12-26 RX ORDER — DICYCLOMINE HYDROCHLORIDE 10 MG/1
10 CAPSULE ORAL 3 TIMES DAILY
Status: CANCELLED | OUTPATIENT
Start: 2024-12-26

## 2024-12-26 RX ORDER — AMOXICILLIN 250 MG
2 CAPSULE ORAL 2 TIMES DAILY PRN
Status: CANCELLED | OUTPATIENT
Start: 2024-12-26

## 2024-12-26 RX ORDER — MULTIVITAMIN WITH IRON
500 TABLET ORAL DAILY
Status: CANCELLED | OUTPATIENT
Start: 2024-12-27

## 2024-12-26 RX ORDER — FAMOTIDINE 20 MG/1
20 TABLET, FILM COATED ORAL 2 TIMES DAILY PRN
Status: CANCELLED | OUTPATIENT
Start: 2024-12-26

## 2024-12-26 RX ORDER — POLYETHYLENE GLYCOL 3350 17 G/17G
17 POWDER, FOR SOLUTION ORAL DAILY
Status: CANCELLED | OUTPATIENT
Start: 2024-12-27

## 2024-12-26 RX ORDER — LIDOCAINE 40 MG/G
CREAM TOPICAL
Status: CANCELLED | OUTPATIENT
Start: 2024-12-26

## 2024-12-26 RX ORDER — PREGABALIN 50 MG/1
50 CAPSULE ORAL 2 TIMES DAILY
Status: CANCELLED | OUTPATIENT
Start: 2024-12-26

## 2024-12-26 RX ORDER — TRAZODONE HYDROCHLORIDE 50 MG/1
100 TABLET, FILM COATED ORAL AT BEDTIME
Status: CANCELLED | OUTPATIENT
Start: 2024-12-26

## 2024-12-26 RX ORDER — FERROUS SULFATE 325(65) MG
325 TABLET ORAL EVERY OTHER DAY
Status: DISCONTINUED | OUTPATIENT
Start: 2024-12-27 | End: 2024-12-26

## 2024-12-26 RX ORDER — FERROUS SULFATE 325(65) MG
325 TABLET ORAL DAILY
Status: CANCELLED | OUTPATIENT
Start: 2024-12-27

## 2024-12-26 RX ORDER — CETIRIZINE HYDROCHLORIDE 10 MG/1
10 TABLET ORAL DAILY
Status: CANCELLED | OUTPATIENT
Start: 2024-12-26

## 2024-12-26 RX ORDER — MULTIPLE VITAMINS W/ MINERALS TAB 9MG-400MCG
1 TAB ORAL DAILY
Status: CANCELLED | OUTPATIENT
Start: 2024-12-27

## 2024-12-26 RX ORDER — IPRATROPIUM BROMIDE AND ALBUTEROL SULFATE 2.5; .5 MG/3ML; MG/3ML
3 SOLUTION RESPIRATORY (INHALATION) EVERY 4 HOURS PRN
Status: CANCELLED | OUTPATIENT
Start: 2024-12-26

## 2024-12-26 RX ORDER — MAGNESIUM OXIDE 400 MG/1
400 TABLET ORAL DAILY
Status: CANCELLED | OUTPATIENT
Start: 2024-12-27

## 2024-12-26 RX ORDER — MIRTAZAPINE 15 MG/1
15 TABLET, FILM COATED ORAL AT BEDTIME
Status: CANCELLED | OUTPATIENT
Start: 2024-12-26

## 2024-12-26 RX ORDER — ATORVASTATIN CALCIUM 40 MG/1
40 TABLET, FILM COATED ORAL DAILY
Status: CANCELLED | OUTPATIENT
Start: 2024-12-27

## 2024-12-26 RX ORDER — GUAIFENESIN 200 MG/10ML
200 LIQUID ORAL 2 TIMES DAILY PRN
Status: CANCELLED | OUTPATIENT
Start: 2024-12-26

## 2024-12-26 RX ORDER — PREGABALIN 50 MG/1
50 CAPSULE ORAL 2 TIMES DAILY
Qty: 30 CAPSULE | Status: SHIPPED | DISCHARGE
Start: 2024-12-26

## 2024-12-26 RX ORDER — FOLIC ACID 1 MG/1
1000 TABLET ORAL DAILY
Status: CANCELLED | OUTPATIENT
Start: 2024-12-27

## 2024-12-26 RX ADMIN — PREGABALIN 50 MG: 50 CAPSULE ORAL at 09:08

## 2024-12-26 RX ADMIN — ASPIRIN 81 MG: 81 TABLET, COATED ORAL at 09:08

## 2024-12-26 RX ADMIN — Medication 1 TABLET: at 09:08

## 2024-12-26 RX ADMIN — IRON SUCROSE 200 MG: 20 INJECTION, SOLUTION INTRAVENOUS at 08:41

## 2024-12-26 RX ADMIN — ATORVASTATIN CALCIUM 40 MG: 40 TABLET, FILM COATED ORAL at 09:08

## 2024-12-26 RX ADMIN — CEFDINIR 300 MG: 300 CAPSULE ORAL at 09:08

## 2024-12-26 RX ADMIN — DICYCLOMINE HYDROCHLORIDE 10 MG: 10 CAPSULE ORAL at 09:08

## 2024-12-26 RX ADMIN — MAGNESIUM OXIDE TAB 400 MG (241.3 MG ELEMENTAL MG) 400 MG: 400 (241.3 MG) TAB at 09:08

## 2024-12-26 RX ADMIN — PANTOPRAZOLE SODIUM 40 MG: 40 TABLET, DELAYED RELEASE ORAL at 09:08

## 2024-12-26 RX ADMIN — Medication 12.5 MG: at 09:08

## 2024-12-26 RX ADMIN — FERROUS SULFATE TAB 325 MG (65 MG ELEMENTAL FE) 325 MG: 325 (65 FE) TAB at 09:08

## 2024-12-26 RX ADMIN — CYANOCOBALAMIN TAB 500 MCG 500 MCG: 500 TAB at 09:08

## 2024-12-26 RX ADMIN — FOLIC ACID 1000 MCG: 1 TABLET ORAL at 09:08

## 2024-12-26 ASSESSMENT — ACTIVITIES OF DAILY LIVING (ADL)
ADLS_ACUITY_SCORE: 68

## 2024-12-26 NOTE — CONSULTS
[facility]  WO Nurse Inpatient Assessment     Consulted for: coccyx (sacrum)    Summary: Sacrum wound stage 2 present on admit.     12/26: NO assessment. Chart reviewed, no new history available to update at this time. Last [facility] assessment 12/24. Added wound care per last [facility] note. Will reassess next week.     Patient History (according to provider note(s):      Vandana Ivey is a 79 year old female with history most notable for pulmonary fibrosis with chronic home O2 of 2.5 L, meningioma status postresection, hiatal hernia s/p repair, IBS, chronic dizziness, osteoporosis who presented from home with progressive dizziness and shortness of breath admitted to [facility] for sepsis.  Workup notable for parainfluenza infection on respiratory viral panel as well as E. coli urinary tract infection and findings concerning for bacterial pneumonia.  Patient transferred from [facility] to [facility] on 12/19.  Patient admitted for acute on chronic respiratory failure secondary to community-acquired pneumonia and parainfluenza infection complicated by E. coli urinary tract infection.     Assessment:      Areas visualized during today's visit: Sacrum/coccyx    Pressure Injury Location: sacrum    Last photo: 12/24  Wound type: Pressure Injury     Pressure Injury Stage: 2, present on admission   Wound history/plan of care:   Present on admit. Pt states this started over a year ago. She is frail with little fat pad over coccyx/sacral region.     Wound base: 100 % Dermis,      Palpation of the wound bed: normal      Drainage: scant     Description of drainage: serous     Measurements (length x width x depth, in cm) 0.5  x 0.3  x  0.2 cm   Periwound skin: Intact and Macerated      Color: pink      Temperature: normal   Odor: none  Pain: denies , none  Pain intervention prior to dressing change: slow and gentle cares   Treatment goal: Heal   STATUS: initial  assessment  Supplies ordered: discussed with RN and discussed with patient     My PI Risk Assessment     Sensory Perception: 3 - Slightly Limited     Moisture: 3 - Occasionally moist      Activity: 3 - Walks occasionally      Mobility: 3 - Slightly limited      Nutrition: 3 - Adequate     Friction/Shear: 2 - Potential problem      TOTAL: 18       Treatment Plan:     Sacrum wound: Every 3 days   Cleanse the area with NS and pat dry.  Apply No sting film barrier to periwound skin.  Cover wound with Sacral Mepilex (#[id number])  DO NOT apply barrier cream under mepilex.  Change dressing Q 3 days.  Turn and reposition Q 2hrs side to side only.  Ensure pt has Nigel-cushion while sitting up in the chair.  Ensure air pump on bed is turned on and set to isoflex.     FYI- If pt has constant incontinent loose stools needing dressing changes Q shift please discontinue the Mepilex dressing and apply criticaid barrier paste BID and PRN.        Orders: Written    RECOMMEND PRIMARY TEAM ORDER: None, at this time  Education provided: importance of repositioning, plan of care, and wound progress  Discussed plan of care with: Patient and Nurse  WOC nurse follow-up plan: weekly  Notify WOC if wound(s) deteriorate.  Nursing to notify the Provider(s) and re-consult the WOC Nurse if new skin concern.    DATA:     Current support surface: Standard  Low air loss (GABRIEL pump, Isolibrium, Pulsate)  Containment of urine/stool: Incontinence Protocol  BMI: Body mass index is 16.02 kg/m .   Active diet order: Orders Placed This Encounter      Regular Diet Adult     Output: No intake/output data recorded.     Labs:   Recent Labs   Lab 12/26/24  0834 12/24/24  0620 12/23/24  1319   ALBUMIN  --   --  2.5*   HGB 9.2*   < >  --    WBC 8.4   < >  --     < > = values in this interval not displayed.     Pressure injury risk assessment:   Sensory Perception: 4-->no impairment  Moisture: 3-->occasionally moist  Activity: 3-->walks occasionally  Mobility:  3-->slightly limited  Nutrition: 2-->probably inadequate  Friction and Shear: 1-->problem  Britton Score: 16    Aleyda Marie RN CWOCN  Pager no longer is use, please contact through [facility] group: [facility] Nurse [facility]  Dept. Office Number: [phone]

## 2024-12-26 NOTE — PLAN OF CARE
Occupational Therapy Discharge Summary    Reason for therapy discharge:    Discharged to transitional care facility.    Progress towards therapy goal(s). See goals on Care Plan in [facility] electronic health record for goal details.  Goals partially met.  Barriers to achieving goals:   discharge from facility.    Therapy recommendation(s):    Continued therapy is recommended.  Rationale/Recommendations:  OT at TCU to progress ADL independence.

## 2024-12-26 NOTE — PLAN OF CARE
6MS DISCHARGE    D: Patient discharged to TCU at 1:00. Patient accompanied by pt transport via w/c    I: Discharge prescriptions given to patient. All discharge medications and instructions reviewed with pt. PIV removed. Education completed.    A: Pt verbalized understanding of discharge medications and instructions. Prescribed home medications given to patient.  Belongings returned to patient.    P: Patient to follow-up     All belongings sent with pt including dentures, glasses and her phone.           Goal Outcome Evaluation:     Patient is alert and oriented x3, disoriented to time. Able to make needs known. On 2.5 L NC.  Denies N/V, numbness/tingling or CP. Incontinent of b/b. LBM 12/26. Voiding spontaneously with no difficulty. Ate 75% of breakfast. A/1 with walker/gb, A/2 with turn/repo/change.Skin CDI. Bed in low position. Call light within reach. Continue with POC.

## 2024-12-26 NOTE — PHARMACY-ADMISSION MEDICATION HISTORY
Admission Medication History completed by pharmacist, Warner Ba on 12/19/24. Please see Pharmacy - Admission Medication History note under previous encounter at [facility]. for information regarding prior to admission medications.    Ana María Ac, PharmD   Clinical Pharmacist

## 2024-12-26 NOTE — PHARMACY
"The following home medications were NOT continued on inpatient admission per \"Discontinuation of nonessential home medications during hospitalization\" policy: fosamax    If a therapeutic holiday is deemed inappropriate per the prescriber, please notify the pharmacist regarding the medication order.    The pharmacist is available to answer any questions and/or concerns the patient may have regarding discontinuation of non-essential medications.    Please ensure that these medications are restarted as needed upon discharge via the medication reconciliation discharge process and included on the discharge medication reconciliation report.    Thank you,  FERNANDEZ BROWN RPH   "

## 2024-12-26 NOTE — PROGRESS NOTES
"Care Management Discharge Note    Discharge Date: 12/20/2024     Discharge Disposition: Transitional Care    [facility]  [address]  [place] [address]     Discharge Services: Post Acute Therapies    Discharge DME: None    Discharge Transportation: Staff \"Rollover\" at 12:30 pm    Private pay costs discussed: Not applicable    Does the patient's insurance plan have a 3 day qualifying hospital stay waiver?  No    PAS Confirmation Code: [id number]    Patient/family educated on Medicare website which has current facility and service quality ratings: Yes    Education Provided on the Discharge Plan: Yes    Persons Notified of Discharge Plans: Charge Nurse, Bedside Nurse, MD, Patient, Patient's Daughter    Patient/Family in Agreement with the Plan: Yes    Handoff Referral Completed: Yes, [facility] PCP: Internal handoff referral completed    Additional Information:    Patient to discharge to [facility] via staff \"rollover\" at 12:30. Writer spoke with HUC who will order the \"rollover\". Patient and daughter notified of transfer to Transitional Care. PAS completed. CHW reported she will complete IMM, document in Epic, fax to HIM and put original in chart. Internal Hand off to patient's Primary Care Physician completed.     GITA Freeman, MSW  6th Floor Medical Surgical Unit  Phone [phone]      Message me securely in [employer]       "

## 2024-12-26 NOTE — CARE PLAN
Patient admitted to TCU today for ongoing rehabilitation.  On arrival patient's blood pressure was noted to be 89/46 with maps of 60.  Given patient's body habitus, likely around his lower with a blood pressure.  Denies any dizziness at this time.  -Encourage adequate p.o. intake  -Low threshold for IV albumin to increase intravascular circulation.  -H&P to follow later      MADHURI RALPH MD  Hospitalist Service  [facility] Transitional [facility] Unit

## 2024-12-26 NOTE — PLAN OF CARE
Physical Therapy Discharge Summary    Reason for therapy discharge:    Discharged to transitional care facility.    Progress towards therapy goal(s). See [facility] on Care [facility] for goal details.  Goals not met.  Barriers to achieving goals:   discharge from facility.    Therapy recommendation(s):    Continued therapy is recommended.  Rationale/Recommendations:  Pt would benefit from further PT for strengthening and increase independence with all functional mobility.

## 2024-12-26 NOTE — DISCHARGE SUMMARY
[facility]  Hospitalist Discharge Summary      Date of Admission:  12/18/2024  Date of Discharge:  12/26/2024  Discharging Provider: Nakul Brock MD  Discharge Service: Hospitalist Service, GOLD TEAM 21    Discharge Diagnoses   Chief Complaint/Reason for admission:  Shortness of breath       Present on admission   (Resolved) Urinary Tract Infection    Discharge Diagnoses  Active Problems:    Iron deficiency anemia secondary to inadequate dietary iron intake    Parainfluenza    IPF (idiopathic pulmonary fibrosis) (H)        Clinically Significant Risk Factors     # Cachexia: Estimated body mass index is 17.95 kg/m  as calculated from the following:    Height as of this encounter: 1.524 m (5').    Weight as of this encounter: 41.7 kg (91 lb 14.9 oz).       Follow-ups Needed After Discharge   Follow-up Appointments       Follow Up and recommended labs and tests      Follow up with primary care provider in 2 weeks.  CBC in a week  Follow up with Pulmonology                Discharge Disposition   Discharged to short-term care facility  Condition at discharge: Stable    Hospital Course   Vandana Ivey is a 79 year old female with history most notable for pulmonary fibrosis with chronic home O2 of 2.5 L, meningioma status postresection, hiatal hernia s/p repair, IBS, chronic dizziness, osteoporosis who presented from home with progressive dizziness and shortness of breath admitted to [facility] for sepsis.  Workup notable for parainfluenza infection on respiratory viral panel as well as E. coli urinary tract infection and findings concerning for bacterial pneumonia.  Patient transferred from [facility] to [facility] on 12/19.  Patient admitted for acute on chronic respiratory failure secondary to community-acquired pneumonia and parainfluenza infection complicated by E. coli urinary tract infection.      Today :  -discharge to [facility] today     # Acute  on chronic respiratory failure secondary to community-acquired pneumonia and parainfluenza infection  #Pulmonary fibrosis  Baseline oxygen need of 2 to 3 L at home.  Initial VBG with significant CO2 retention of chronic pattern.  Unclear baseline as no prior gas in system.  Follows with pulmonary-last clinic date 11/19 still under workup for ILD.  Presented with progressive sore shortness of breath with vague for progressive dizziness.  Echo-remarkable for severe left atrial enlargement, mild pulmonary hypertension LV function and global right ventricular function being normal with mild enlargement.  Imaging concerning for possible pneumonia.  Parainfluenza positive.  As of 12/22, back to baseline O2 needs.  Isolated fever on 12/21; no fever since.  - switch CTX> cefdinir to finish 7 day course   -Will have low threshold to consult from pulmonology if respiratory issues    #Sepsis considered and ruled out   #E. coli urinary tract infection  Patient presented with progressive shortness of breath with vague but progressive dizziness.  Lactic acid within normal limits.  UA consistent with infectious with subsequent culture growing greater than 100,000 E. coli.  Reports significant urinary frequency prior to admission.  Patient has shown clinical improvement since the start of antibiotics.  Urine culture susceptible to third-generation beta-lactam's.  - switch CTX> cefdinir to finish 7 day course   -Follow-up blood cultures, no growth to date    #Anemia   -Normochromic anemia   -no bleed noted in BM  -baseline is around 12 now down to 9.2  -iron levels are low with iron sat at 6 %  -received one dose of Venofer IV, continue oral iron supplement   - follow up with PCP with repeat CBC in a week      #Encephalopathy likely secondary to hospital delirium (resolved)  Patient having intermittent confusion on 12/22.  On 12/23, patient somnolent for a portion of the late morning.  VBG remarkable for pCO2 of 82 but well  "compensated with a pH within normal range.  Discussed with the pulmonology provider who agrees that this would not account for her mental status change.  Patient perked up when daughter arrived in the afternoon.  - Delirium precautions, continue to monitor      #Goals of care  Per admitting provider-\"Patient reports she wants to think about it if she wants to be full code or DNR.  Will order full code for now.  Given pulmonary fibrosis and progressive hypercapnia and hypoxia.  I am worried that she might require intubation during this hospitalization.  Patient is also more keen towards going home than any prolonged hospitalization or rehab.  Daughter is aware and understands. Patient is currently able to take her own medication at home and lives independently, gets Meals on Wheels \"  on 12/20-I had a long discussion with daughter over the phone (see additional note on same day).  She later discussed with patient and ultimate decision was made by patient for CODE STATUS to be changed to DNR/DNI.  -PT/OT-recommending TCU  -Speech assessment - Recommend continue current regular diet and thin liquids with patient self-selecting softer solids.         Chronic Conditions:  Osteoporosis without current pathological fracture, unspecified osteoporosis type  Hiatal hernia  Gastroesophageal reflux disease   Dizziness  Benign paroxysmal positional vertigo, unspecified laterality  Meningioma (H) s/p resection.   Insomnia,   Irritable bowel syndrome with constipation  Gastroparesis    Consultations This Hospital Stay   MEDICATION HISTORY IP PHARMACY CONSULT  PHYSICAL THERAPY ADULT IP CONSULT  OCCUPATIONAL THERAPY ADULT IP CONSULT  SPEECH LANGUAGE PATH ADULT IP CONSULT  CARE MANAGEMENT / SOCIAL WORK IP CONSULT  WOUND OSTOMY CONTINENCE NURSE  IP CONSULT  PHYSICAL THERAPY ADULT IP CONSULT  OCCUPATIONAL THERAPY ADULT IP CONSULT  WOUND OSTOMY CONTINENCE NURSE  IP CONSULT    Code Status   Prior    Time Spent on this Encounter   I, Nakul " Frances Brock MD, personally saw the patient today and spent greater than 30 minutes discharging this patient.       Nakul Brock MD  [facility]  [facility] [address]  Phone: [phone]  Fax: [phone]  ______________________________________________________________________    Physical Exam   Vital Signs:                    Weight: 91 lbs 14.91 oz  Constitutional: awake, alert, cooperative, no apparent distress, and appears stated age  Respiratory: No increased work of breathing, good air exchange, clear to auscultation bilaterally, no crackles or wheezing  Cardiovascular: Normal apical impulse, regular rate and rhythm, normal S1 and S2, no S3 or S4, and no murmur noted  GI: No scars, normal bowel sounds, soft, non-distended, non-tender, no masses palpated, no hepatosplenomegally       Primary Care Physician   Ashwini Goode    Discharge Orders      Medication Therapy Management Referral      Primary Care - Care Coordination Referral      Primary Care Referral      General info for SNF    Length of Stay Estimate: Short Term Care: Estimated # of Days <30  Condition at Discharge: Stable  Level of care:skilled   Rehabilitation Potential: Excellent  Admission H&P remains valid and up-to-date: Yes  Recent Chemotherapy: N/A  Use Nursing Home Standing Orders: Yes     Reason for your hospital stay    Admitted for progressive dizziness and shortness of breath. Workup notable for parainfluenza infection on respiratory viral panel as well as E. coli urinary tract infection and findings concerning for bacterial pneumonia. Treated with Ceftriaxone > cefdinir for total 7 days.  You were noted to have anemia with iron deficiency, we gave you IV iron and continued oral supplementation   Primary care doctor may need to refer you for colonoscopy for cancer screening, although based on your age you can decide if that if itll benefit you after a discussion with your doctor      Intake and output    Every shift     Daily weights    Call Provider for weight gain of more than 2 pounds per day or 5 pounds per week.     Activity - Up ad hailey     Follow Up and recommended labs and tests    Follow up with primary care provider in 2 weeks.  CBC in a week  Follow up with Pulmonology     Physical Therapy Adult Consult    Evaluate and treat as clinically indicated.    Reason:  Dizziness     Occupational Therapy Adult Consult    Evaluate and treat as clinically indicated.    Reason:  Dizziness     Droplet Isolation     Oxygen (SNF/TCU) Discharge     Fall precautions     Diet    Follow this diet upon discharge: Current Diet:Orders Placed This Encounter      Snacks/Supplements Adult: Ensure Enlive; With Meals      Regular Diet Adult     CBC with platelets and differential       Significant Results and Procedures   Results for orders placed or performed during the hospital encounter of 24   XR Chest 2 Views    Narrative    EXAM: XR CHEST 2 VIEWS  LOCATION: [facility]  DATE: 2024    INDICATION: SOB, dizziness, chest pain  COMPARISON: 2024      Impression    IMPRESSION: Cardiac silhouette is enlarged. Severe fibrotic changes throughout the lungs with interstitial thickening and airspace consolidation at the left base which could reflect pneumonia and/or pleural effusion. No visible pneumothorax.   Echo Complete     Value    LVEF  60-65%    Narrative    [id number]  [id number]  [id number]  [id number]^DINORA^NARGIS^MICHAEL     [facility]  Echocardiography Laboratory  [address] [place] [address]     Name: KERLINE TAMAYO  MRN: [id number]  : 1945  Study Date: 2024 08:48 AM  Age: 79 yrs  Gender: Female  Patient Location: [facility]  Reason For Study: Pulmonary Hypertension  Ordering Physician: NARGIS FARIAS  Performed By: Mckenna Flores RDCS     BSA: 1.4 m2  Height: 61 in  Weight: 96 lb  HR: 72  BP:  113/93 mmHg  ______________________________________________________________________________  Procedure  Echocardiogram with two-dimensional, color and spectral Doppler.  ______________________________________________________________________________  Interpretation Summary  Left ventricular size, wall motion and function are normal. The ejection  fraction is 60-65%.  Mild right ventricular dilation. Global right ventricular function is normal.  Severe left atrial enlargement.  Mild pulmonary hypertension. Estimated pulmonary artery systolic pressure is  45 mmHg.  The inferior vena cava was normal in size with preserved respiratory  variability.  No pericardial effusion.  There is no prior study for direct comparison.  ______________________________________________________________________________  Left Ventricle  Left ventricular size, wall motion and function are normal. The ejection  fraction is 60-65%. Grade II or moderate diastolic dysfunction.     Right Ventricle  Global right ventricular function is normal. Mild right ventricular dilation  is present.     Atria  Severe left atrial enlargement is present. Moderate right atrial enlargement  is present.     Mitral Valve  The mitral valve is normal. Mild mitral insufficiency is present.     Aortic Valve  Trileaflet aortic sclerosis without stenosis.     Tricuspid Valve  The tricuspid valve is normal. Mild tricuspid insufficiency is present.  Estimated pulmonary artery systolic pressure is 42 mmHg plus right atrial  pressure. Mild pulmonary hypertension is present.     Pulmonic Valve  The pulmonic valve is normal. Trace pulmonic insufficiency is present.     Vessels  Normal diameter aortic root and proximal ascending aorta. The inferior vena  cava was normal in size with preserved respiratory variability.     Pericardium  No pericardial effusion is present.     Compared to Previous Study  There is no prior study for direct  comparison.  ______________________________________________________________________________  MMode/2D Measurements & Calculations     IVSd: 1.0 cm  LVIDd: 3.7 cm  LVIDs: 2.7 cm  LVPWd: 1.0 cm  FS: 28.1 %  LV mass(C)d: 112.5 grams  LV mass(C)dI: 81.4 grams/m2  Ao root diam: 3.1 cm  asc Aorta Diam: 3.1 cm  LVOT diam: 2.1 cm  LVOT area: 3.3 cm2  Ao root diam index Ht(cm/m): 2.0  Ao root diam index BSA (cm/m2): 2.3  Asc Ao diam index BSA (cm/m2): 2.3  Asc Ao diam index Ht(cm/m): 2.0  RV Base: 4.4 cm  RWT: 0.54  TAPSE: 2.4 cm     Doppler Measurements & Calculations  MV E max merritt: 75.5 cm/sec  MV A max merritt: 85.7 cm/sec  MV E/A: 0.88  MV dec time: 0.22 sec  PA acc time: 0.12 sec  TR max merritt: 322.6 cm/sec  TR max P.6 mmHg  E/E' av.7  Lateral E/e': 11.5  Medial E/e': 13.9  RV S Merritt: 12.7 cm/sec     ______________________________________________________________________________  Report approved by: Kishore Castillo MD on 2024 10:50 AM             Discharge Medications   Discharge Medication List as of 2024  1:11 PM        START taking these medications    Details   guaiFENesin (ROBITUSSIN) 20 mg/mL liquid Take 10 mLs (200 mg) by mouth 2 times daily as needed for cough., Transitional      ipratropium - albuterol 0.5 mg/2.5 mg/3 mL (DUONEB) 0.5-2.5 (3) MG/3ML neb solution Take 1 vial (3 mLs) by nebulization every 4 hours as needed for wheezing., Transitional           CONTINUE these medications which have CHANGED    Details   pregabalin (LYRICA) 50 MG capsule Take 1 capsule (50 mg) by mouth 2 times daily., Disp-30 capsule, Transitional           CONTINUE these medications which have NOT CHANGED    Details   alendronate (FOSAMAX) 70 MG tablet Take 1 tablet (70 mg) by mouth every 7 days., Disp-12 tablet, R-3, E-Prescribe      aspirin 81 MG EC tablet Take 81 mg by mouth daily., Historical      atorvastatin (LIPITOR) 40 MG tablet Take 1 tablet (40 mg) by mouth daily at 2 pm., Disp-90 tablet, R-3, E-Prescribe       cetirizine (ZYRTEC) 10 MG tablet Take 1 tablet by mouth daily at 2 pm., Historical      cyanocobalamin (VITAMIN B-12) 500 MCG tablet Take 1 tablet (500 mcg) by mouth daily., OTC      dicyclomine (BENTYL) 10 MG capsule Take 1 capsule (10 mg) by mouth 3 times daily., Disp-90 capsule, R-5, E-Prescribe      famotidine (PEPCID) 20 MG tablet Take 20 mg by mouth 2 times daily as needed (gastric reflux or symptoms)., Historical      FEROSUL 325 (65 Fe) MG tablet Take 1 tablet by mouth daily at 2 pm., DARRICK, Historical      folic acid (FOLVITE) 1 MG tablet Take 1 tablet by mouth daily at 2 pm., Historical      magnesium 250 MG tablet Take 1 tablet by mouth daily., Historical      meclizine (ANTIVERT) 12.5 MG tablet Take 1 tablet (12.5 mg) by mouth 3 times daily as needed for dizziness., Disp-60 tablet, R-1, E-Prescribe      metoprolol tartrate (LOPRESSOR) 25 MG tablet Take 0.5 tablets (12.5 mg) by mouth 2 times daily., Disp-90 tablet, R-1, E-Prescribe      mirtazapine (REMERON) 7.5 MG tablet Take 2 tablets (15 mg) by mouth at bedtime., Disp-180 tablet, R-1, E-Prescribe      pantoprazole (PROTONIX) 40 MG EC tablet Take 1 tablet (40 mg) by mouth 2 times daily., Disp-60 tablet, R-5, E-Prescribe      polyethylene glycol (MIRALAX) 17 GM/Dose powder Take 17 g (1 Capful) by mouth daily as needed for constipation., Disp-510 g, R-1, E-Prescribe      Thiamine HCl (B-1) 100 MG TABS Take 1 tablet by mouth daily at 2 pm., Historical      traZODone (DESYREL) 100 MG tablet Take 1 tablet (100 mg) by mouth at bedtime., Disp-90 tablet, R-1, E-Prescribe      vitamin E 400 units TABS Take 400 Units by mouth daily., Historical           STOP taking these medications       cefdinir (OMNICEF) 300 MG capsule Comments:   Reason for Stopping:         LORazepam (ATIVAN) 1 MG tablet Comments:   Reason for Stopping:         metoclopramide (REGLAN) 5 MG tablet Comments:   Reason for Stopping:             Allergies   Allergies   Allergen Reactions     Diclofenac      GI intolerance    Erythromycin      GI intolerance

## 2024-12-26 NOTE — PROGRESS NOTES
"Patient is a 79 year old female  admitted to room 427 via transport.  Patient is alert and oriented X 3. See Epic for VS and assessment. Patient is able to transfer A1 using walker. Patient was settled into their room, shown call light, tv, mealtimes etc. Oriented to unit. Will continue monitoring pain level and VS. Notifying MD with any concerns.  Follow MD orders for cares and medications.    Flu Vaccine:  - Have not had flu vaccine this season and would like; please add this on sticky for provider to order      COVID Vaccine:  -  Declined COVID vaccine at this time, education on risks and benefits provided        Pneumococcal Vaccine:  - Unsure if up to date and would like vaccine. Alert provider to look at vaccine history and order as appropriate       Ethnicity:   Race: White  Dentures: Yes  Hearing Aid: No  Smoker:  No  Glasses: Yes  Falls 0-1 mo:02-6 mo: 0  Prior device use: Walker  [facility] Directive Referral to Social Work?Yes     BP low on arrive. 86/47- MD notified and order IV albumin. Speciality bed in place.     12/30/24 Addendum by Kelsie Dent:   Flu: last administered 1/13/23 - added on sticky for provider to review and order, if appropriate.   COVID: last administered 1/13/23 - per above, declined with education on risks and benefits provided.   Pneumococcal: rcvd 13 9/16/19 and 23 10/16/13. Per CDC, \"Based on shared clinical decision-making, decide whether to administer one dose of PCV20 or PCV21 at least 5 years after the last pneumococcal vaccine dose. Regardless of whether PCV20 or PCV21 is administered, their pneumococcal vaccinations are complete.\" Added on sticky for provider to review and order, if appropriate.   "

## 2024-12-26 NOTE — PLAN OF CARE
[id number]    Goal Outcome Evaluation:      Plan of Care Reviewed With: patient    Overall Patient Progress: no changeOverall Patient Progress: no change      Patient is A&Ox3. Intermittent confusion noted. Able to make needs known. Not OOB during the shift. Incontinent of bowel and bladder. LBM: 12/26.      On continous O2 at 2.5LPM via NC with humidification. Regular diet. Needs assistance ordering food. Pills crush to applesauce. L PIV, SL. Denies pain and other discomfort. Mepilex on sacrum/coccyx area, CDI. Applied mepilex on R hip for protection. Air pump added to mattress to promote weight shifting.     Contact and droplet precaution maintained. Continue with POC.    /72 (BP Location: Left arm, Patient Position: Fowlers, Cuff Size: Adult Small)   Pulse 84   Temp 98.5  F (36.9  C) (Axillary)   Resp 16   Ht 1.524 m (5')   Wt 41.7 kg (91 lb 14.9 oz)   SpO2 96%   BMI 17.95 kg/m        /72 (BP Location: Left arm, Patient Position: Fowlers, Cuff Size: Adult Small)   Pulse 84   Temp 98.5  F (36.9  C) (Axillary)   Resp 16   Ht 1.524 m (5')   Wt 41.7 kg (91 lb 14.9 oz)   SpO2 96%   BMI 17.95 kg/m

## 2024-12-27 NOTE — CONSULTS
Social Work: Initial Assessment with Discharge Plan    Patient Name: Vandana Ivey  : 1945  Age: 79 year old  MRN: [id number]  Completed assessment with: chart review and pt interview.   Admitted to TCU: 24    Presenting Information   Date of SW assessment: 2024  Health Care Directive: will provide education and copy.  Primary Health Care Agent: Self  Secondary Health Care Agent: RUPALI  Living Situation: lives alone in an apt.   Previous Functional Status: Dependent ADLs:: Ambulation-walker  Dependent IADLs:: Independent  DME available:  shower chair, cane, straight, walker, rolling, grab bar, tub/shower   Supplies at home:  O2  Patient and family understanding of hospitalization: appropriate and pleasant.   Cultural/Language/Spiritual Considerations: Pt is a 79 y.o.  female , English speaking, Taoism.   Abuse concerns: Patient Unable to respond  -------------------------------------------------------------------------------------------------------------  TRANSPORTATION:    Has lack of transportation kept you from medical appointments, meetings, work, or from getting things needed for daily living?  A. Yes, it has kept me from medical appointments or from getting my medications  B. Yes, it has kept me from non-medical meetings, appointments, work or from getting things that I need  C. No  X. Patient Unable to respond  Y. Patient declines to respond  -------------------------------------------------------------------------------------------------------------  Health Literacy:   How Often do you need to have someone help you when you read instructions, pamphlets, or other written material from your doctor or pharmacy?  0.       Never  1.       Rarely  2.       Sometimes  3.       Often  4.       Always  5.       Patient declines to respond  6.       Patient unable to  respond  ------------------------------------------------------------------------------------------------------------   BIMS:  See flow sheet     -----------------------------------------------------------------------------------------------------------  CAM:  See flow sheet   -------------------------------------------------------------------------------------------------------------  PHQ-9:    See flow sheet                                                        -------------------------------------------------------------------------------------------------------------  SOCIAL ISOLATION  How often do you feel lonely or isolated from those around you?  0.       Never  1.       Rarely  2.       Sometimes  3.       Often  4.       Always  5.       Patient declines to respond  6.       Patient unable to respond  -------------------------------------------------------------------------------------------------------------    BIMS: Pt scored N/A on BIMS indicating patient unable to respond  PHQ-9: Pt scored N/A on PHQ-9 indicating patient unable to respond  PAS: confirmation number-  [id number]   Has there been a level II screen?  No  Were there any recommendations in the screen? No  If yes, will the recommendations we incorporated into the Plan of Care?  N/A  Physical Health  Reason for admission: Vandana Ivey is a 79 year old female with history most notable for pulmonary fibrosis with chronic home O2 of 2.5 L, meningioma status postresection, hiatal hernia s/p repair, IBS, chronic dizziness, osteoporosis who presented from home with progressive dizziness and shortness of breath admitted to [facility] for sepsis.  Workup notable for parainfluenza infection on respiratory viral panel as well as E. coli urinary tract infection and findings concerning for bacterial pneumonia.  Patient transferred from [facility] to [facility] on 12/19.  Patient admitted for acute on chronic respiratory failure secondary  to community-acquired pneumonia and parainfluenza infection complicated by E. coli urinary tract infection.     Provider Information   Primary Care Physician:Ashwini Goode     [address] [facility], [facility] [address]   [phone]  : none reported     Mental Health:   Diagnosis: Depression   Current Support/Services: medication-mirtazapine 7.5MG  Previous Services: medication  Services Needed/Recommended: SW offered  [place] [facility] services [facility] at [facility]     Substance Use:  Diagnosis: none reported  Current Support/Services: none reported  Previous Services: none reported  Services Needed/Recommended: SW offered [place] [facility] services while at [facility].     Support System  Marital Status:    Family support: Daughter/Marcelina lives   Other support available: none reported   Gaps in support system: in-home services     Personalized Care and Trauma  What other information would help us give you more personalized care or how can we help you with any past trauma?unable to respond    MyChart:  Do you have access to [employer] to view my Baseline Care Plan? No  If not, then SW will print out and provide Baseline Care Plan.     Community Resources  Current in home services:   Previous services: none reported     Financial/Employment/Education  Employment Status: retired   Income Source: social security   Education: not discussed   Financial Concerns:  struggling to pay for basic needs   Insurance:MEDICARE     Discharge Plan   Patient and family discharge goal: Home if strong enough to care for self  Provided Education on discharge plan: YES  Patient agreeable to discharge plan:  YES  A list of Medicare Certified Facilities was provided to the patient and/or family to encourage patient choice. Based on location and rating, patient would like referrals made to: Yes  General information regarding anticipated insurance coverage and possible out of pocket cost was discussed.  Patient and patient's family are aware patient may incur the cost of transportation to the facility, pending insurance payment: YES  Barriers to discharge: complex medical condition     Discharge Recommendations   Disposition: Home   Transportation Needs: to be determined   Name of Transportation Company and Phone: N/A    Additional comments     Sw met with to complete assessment. Patient unable to respond to questions; experiencing difficulty speaking. Sw called and spoke to patients daughter Marcelina. She shared that they were helping patient's with groceries shopping, paying the bills and has had her dog since September because of her inability to go up and down the elevator with the dog. Shared that she has been declining since September. She has been working elderly waiver paperwork. Goal is home if strong enough to care for self. Daughter indicated that ER doctor spoke to her about hospice.     Belén Chatman [facility], Acute Inpatient Rehab Unit   [address] [place] [address], [address] [place] [address]  Phone: [phone]  Fax: [phone]

## 2024-12-27 NOTE — PLAN OF CARE
Goal Outcome Evaluation:         Pt alert with some confusions. No c/o chest pain, SOB, N/V. 02 @ 2.5LPM via nasal cannula. Transfers with A1 using walker and GB. Refused therapy today. Pt takes medications whole with applesauce. Ate 50% lunch. Dressing on coccyx and R hip intact. Stayed in bed the whole shift, pt stated being so tired. L PIV flushed with NaCl. Pleasant and cooperative. Will continue with POC.          Patient's most recent vital signs are:     Vital signs:  BP: 91/47  Temp: 97.7  HR: 70  RR: 16  SpO2: 93 %

## 2024-12-27 NOTE — PLAN OF CARE
Attempted to see pt for PT eval. Pt with eyes closed the entire time, answers questions 50% of the time. States she is tired, and refused even rolling or repositioning. Unable to gather any subjective or objective exam from her.    Will re-attempt tomorrow.

## 2024-12-27 NOTE — PROGRESS NOTES
"   12/27/24 1500   Appointment Info   Signing Clinician's Name / Credentials (SLP) Larry Santos MS, CCC-SLP   General Information   Onset of Illness/Injury or Date of Surgery 12/18/24   Referring Physician Chun Zamora MD   Pertinent History of Current Problem Per H&P: Patient is \"79-year-old female with a past medical history of pulmonary fibrosis) and hypoxia (on 2-3 L home O2), osteoporosis, IBS, chronic dizziness, history of meningioma s/p resection.  Patient presented to [facility] on 12/18/2024 with progressive dizziness, increased shortness of breath with workup notable for parainfluenza infection.  She was also treated for community acquired pneumonia thought to be secondary to superimposed bacterial infection from parainfluenza infection.  Patient was also found to have E. coli UTI during hospital stay as well.  For her anemia, patient did receive IV Venofer in the hospital prior to admission to TCU.\" SLP consult received 12/26 for cognitive evaluation.   General Observations Pt followed by acute SLP for dysphagia, regular solids/thin liquids recommended at eval. Acute SLP clinical swallow eval notes \"hx of aphasia,\" pt reported aphasia s/p meningioma resection, difficulty recalling year of resection and SLP with difficulty locating information in available medical records. Pt moved [facility] to [place] within past few years. CLQT+Aphasia administration planned for cognitive-linguistic evaluation.   Type of Evaluation   Type of Evaluation Speech, Language, Cognition   Cognition   Cognitive Function attention deficit;executive function deficit;memory deficit   Cognitive Status Alert, reporting fatigue, pleasant   Additional cognitive-linguistic evaluation indicated  Other (see comments)   Cognitive Status Exam Comments CLQT+ Aphasia administration initiated, to be completed in next session.   Orientation Status (Cognition) oriented to;person   Affect/Mental Status (Cognition) WFL   Follows " Commands (Cognition) follows one-step commands;WFL   Executive Function Deficit (Cognition) severe deficit  (suspected)   Attention Deficit (Cognition) severe deficit  (suspected)   Memory Deficit (Cognition) severe deficit  (suspected)   General Therapy Interventions   Planned Therapy Interventions Cognitive Treatment   Clinical Impression   Criteria for Skilled Therapeutic Interventions Met (SLP Eval) Yes, treatment indicated   SLP Diagnosis Severe cognitive impairment, suspected   Activity Limitations Related to Problem List (SLP) Increased assist/supervision with iADLs.   Risks & Benefits of therapy have been explained evaluation/treatment results reviewed;care plan/treatment goals reviewed;participants voiced agreement with care plan;participants included;patient   Clinical Impression Comments SLP: Pt with history of aphasia, reported difficulty remembering when tumor resection happened (resulted in aphasia). CLQT+ Aphasia administration initiated, not completed due to pt fatigue. Suspect pt with severe cognitive impairments at this time, cognitive function below prior level of function. Skilled SLP services indicated to instruct in acitivites and cognitive strategies to promote cognitive function and independence.   SLP Total Evaluation Time   Cognitive  Performance Testing Minutes, per hour - includes time for administering test, interp results & prep report ([id number]   Therapy Certification   Start of Care Date 12/27/24   Certification date from 12/27/24   Certification date to 01/25/25   Medical Diagnosis Influenza, community acquired pneumona, E.Coli UTI   SLP Goals   Therapy Frequency (SLP Eval) 3 times/week   SLP Predicted Duration/Target Date for Goal Attainment 01/10/25   SLP Goals SLP Goal 1;SLP Goal 2;SLP Goal 3   SLP Discharge Planning   SLP Plan SLP: Finish CLQT+Aphasia, enter details in progress note, update goals as appropriate.   SLP Time and Intention   Timed Code Treatment Minutes 45   Total  Session Time (sum of timed and untimed services) 45   SLP - Transitional Care Unit Time   Individual Time (minutes) - SLP 45   TCU Total Session Time (minutes) - SLP 45   TCU Daily Total Session Time   SLP TCU Daily Total Session Time 45   Rehab TCU Daily Total Session Time 90

## 2024-12-27 NOTE — PHARMACY-MEDICATION REGIMEN REVIEW
Pharmacy Medication Regimen Review  Vandana Ivey is a 79 year old female who is currently in the Transitional Care Unit.    Assessment: All medications have an appropriate indications, durations and no unnecessary use was found    Plan:   - continue current medication regimen     Attending provider will be sent this note for review.  If there are any emergent issues noted above, pharmacist will contact provider directly by phone.      Pharmacy will periodically review the resident's medication regimen for any PRN medications not administered in > 72 hours and discontinue them. The pharmacist will discuss gradual dose reductions of psychopharmacologic medications with interdisciplinary team on a regular basis.    Please contact pharmacy if the above does not answer specific medication questions/concerns.    Background:  A pharmacist has reviewed all medications and pertinent medical history today.  Medications were reviewed for appropriate use and any irregularities found are listed with recommendations.      Current Facility-Administered Medications:     [START ON 12/29/2024] - Skin Test Reading -, , Does not apply, Q21 Days, Chun Zamora MD    acetaminophen (TYLENOL) Suppository 650 mg, 650 mg, Rectal, Q4H PRN, Chun Zamora MD    acetaminophen (TYLENOL) tablet 650 mg, 650 mg, Oral, Q4H PRN, Chun Zamora MD    aspirin EC tablet 81 mg, 81 mg, Oral, Daily, Chun Zamora MD, 81 mg at 12/27/24 1001    atorvastatin (LIPITOR) tablet 40 mg, 40 mg, Oral, Daily, Chun Zamora MD, 40 mg at 12/27/24 1002    cetirizine (zyrTEC) tablet 10 mg, 10 mg, Oral, Daily, Chun Zamora MD, 10 mg at 12/26/24 1503    cyanocobalamin (VITAMIN B-12) tablet 500 mcg, 500 mcg, Oral, Daily, Chun Zamora MD, 500 mcg at 12/27/24 1002    dicyclomine (BENTYL) capsule 10 mg, 10 mg, Oral, TID, Chun Zamora MD, 10 mg at 12/27/24 1002    famotidine (PEPCID) tablet 20 mg, 20 mg, Oral, BID PRN, Chun Zamora MD    ferrous  sulfate (FEROSUL) tablet 325 mg, 325 mg, Oral, Daily, Chun Zamora MD, 325 mg at 12/27/24 1002    folic acid (FOLVITE) tablet 1,000 mcg, 1,000 mcg, Oral, Daily, Chun Zamora MD, 1,000 mcg at 12/27/24 1002    guaiFENesin (ROBITUSSIN) 20 mg/mL solution 200 mg, 200 mg, Oral, BID PRN, Chun Zamora MD    ipratropium - albuterol 0.5 mg/2.5 mg/3 mL (DUONEB) neb solution 3 mL, 3 mL, Nebulization, Q4H PRN, Chun Zamora MD    lidocaine (LMX4) cream, , Topical, Q1H PRN, Chun Zamora MD    lidocaine 1 % 0.1-1 mL, 0.1-1 mL, Other, Q1H PRN, Chun Zamora MD    magnesium oxide (MAG-OX) tablet 400 mg, 400 mg, Oral, Daily, Chun Zamora MD, 400 mg at 12/27/24 1002    meclizine (ANTIVERT) tablet 12.5 mg, 12.5 mg, Oral, TID PRN, Chun Zamora MD    metoprolol tartrate (LOPRESSOR) half-tab 12.5 mg, 12.5 mg, Oral, BID, Chun Zamora MD, 12.5 mg at 12/26/24 2117    mirtazapine (REMERON) tablet TABS 15 mg, 15 mg, Oral, At Bedtime, Chun Zamora MD, 15 mg at 12/26/24 2117    multivitamin w/minerals (THERA-VIT-M) tablet 1 tablet, 1 tablet, Oral, Daily, Chun Zamora MD, 1 tablet at 12/27/24 1002    Nurse may request from Pharmacy a change of form of medication (e.g. Liquid to tablet)., , Does not apply, Continuous PRN, Chun Zamora MD    ondansetron (ZOFRAN ODT) ODT tab 4 mg, 4 mg, Oral, Q6H PRN **OR** ondansetron (ZOFRAN) injection 4 mg, 4 mg, Intravenous, Q6H PRN, Chun Zamora MD    oseltamivir (TAMIFLU) capsule 60 mg, 60 mg, Oral, Daily, Chun Zamora MD    pantoprazole (PROTONIX) EC tablet 40 mg, 40 mg, Oral, BID AC, Chun Zamora MD, 40 mg at 12/27/24 0652    polyethylene glycol (MIRALAX) Packet 17 g, 17 g, Oral, Daily, Chun Zamora MD    pregabalin (LYRICA) capsule 50 mg, 50 mg, Oral, BID, Chun Zamora MD, 50 mg at 12/27/24 1002    senna-docusate (SENOKOT-S/PERICOLACE) 8.6-50 MG per tablet 1 tablet, 1 tablet, Oral, BID PRN **OR** senna-docusate (SENOKOT-S/PERICOLACE) 8.6-50  MG per tablet 2 tablet, 2 tablet, Oral, BID PRN, Chun Zamora MD    sodium chloride (PF) 0.9% PF flush 3 mL, 3 mL, Intracatheter, Q8H, Chun Zamora MD, 3 mL at 12/27/24 1004    sodium chloride (PF) 0.9% PF flush 3 mL, 3 mL, Intracatheter, q1 min prn, Chun Zamora MD    thiamine (B-1) tablet 100 mg, 100 mg, Oral, Daily, Chun Zamora MD    traZODone (DESYREL) tablet 100 mg, 100 mg, Oral, At Bedtime, Chun Zamora MD, 100 mg at 12/26/24 2117    tuberculin injection 5 Units, 5 Units, Intradermal, Q21 Days, Chun Zamora MD, 5 Units at 12/27/24 1015  No current outpatient prescriptions on file.

## 2024-12-27 NOTE — PROGRESS NOTES
"OT: EVALUATION   12/27/24 0802   Appointment Info   Signing Clinician's Name / Credentials (OT) SUE Vigil   Rehab Comments (OT) Certification   Living Environment   People in Home alone   Current Living Arrangements apartment   Transportation Anticipated family or friend will provide  (pt does not drive, dtr assists)   Living Environment Comments Per chart review pt lives alone in apartment, walk in shower with shower chair and grab bars. Pt reports she uses her 4WW out in the community but does not use an assistive device in the home. Pt reports she moves around her apartment well because it is \"very small\". Daughter is local and lives nearby but is not there to assist.  At evaluation today, pt verifies some history but difficult providing specifics.   Self-Care   Usual Activity Tolerance moderate   Current Activity Tolerance fair   Equipment Currently Used at Home grab bar, tub/shower;grab bar, toilet;shower chair   Fall history within last six months no   Instrumental Activities of Daily Living (IADL)   IADL Comments After repetitive questions, pt indicates she has been IND preparing meals, changing bed linens, cleaning and setting up her medications.   General Information   Onset of Illness/Injury or Date of Surgery 12/18/24   Referring Physician Chun Zamora MD   Patient/Family Therapy Goal Statement (OT) Unable to state despite cues and repetitive questions.  Pt also seems to have difficulty hearing th through th's mask.   Additional Occupational Profile Info/Pertinent History of Current Problem Patient admitted for acute on chronic respiratory failure secondary to community-acquired pneumonia and parainfluenza infection complicated by E. coli urinary tract infection.   Existing Precautions/Restrictions fall  (O2 set for 3 liters, pt states she is on 2.5 at home.)   Cognitive Status Examination   Cognitive Status Comments Pt needs cues for orientation.  She c/o fatigue and thinks it is 9:15 at " night and not 9:15 in the morning.  Recommend monitoring, assessing and treating for decrease cognition, pt lives aloene..   Visual Perception   Visual Impairment/Limitations corrective lenses full-time   Pain Assessment   Patient Currently in Pain   (c/o pain in her spine)   Range of Motion Comprehensive   Comment, General Range of Motion WFLs for light ADL   Strength Comprehensive (MMT)   Comment, General Manual Muscle Testing (MMT) Assessment WFLs for light ADL, pt is frail, c/o spine pain and is demo generalized weakness.   Bed Mobility   Comment (Bed Mobility) Pt participates with supine scoot with cues for flexing LE's to help push up.  Pt declines OOB activity due to fatigue.   Transfers   Transfer Comments Per note on pt's board, she is assist of one but declines OOB activity due to fatigue.   Balance   Balance Comments Pt leaning to R in bed, max A for repositioning and getting into upright position to feed self breakfast.   Activities of Daily Living   BADL Assessment/Intervention   (see gg scores)   Clinical Impression   Criteria for Skilled Therapeutic Interventions Met (OT) Yes, treatment indicated   OT Diagnosis Impaired ADL, IADL and mobility   OT Problem List-Impairments impacting ADL problems related to;activity tolerance impaired;balance;cognition;mobility;inability to direct their own care;strength;pain   Assessment of Occupational Performance 3-5 Performance Deficits   Identified Performance Deficits gr/hyg, bathing, dressing, transfers, IADl and cognition   Planned Therapy Interventions (OT) ADL retraining;IADL retraining;balance training;bed mobility training;strengthening;transfer training;progressive activity/exercise   Clinical Decision Making Complexity (OT) problem focused assessment/low complexity   Risk & Benefits of therapy have been explained evaluation/treatment results reviewed;care plan/treatment goals reviewed;risks/benefits reviewed;current/potential barriers reviewed;participants  voiced agreement with care plan;participants included;patient   Clinical Impression Comments Patient admitted for acute on chronic respiratory failure secondary to community-acquired pneumonia and parainfluenza infection complicated by E. coli urinary tract infection.  Pt lives alone in an apt, she has a dtr who lives near by and helps with transportation per chart review.  Pt indicates she was IND with ADL and light IADL PTA including her own med set-up.  Today, pt is in bed, she appears frail and is generally weak.  She c/o spine pain with repostioning in bed and she is performing below her baseline. Pt also mildly confused, slow to process, express and answer questions directly.  Anticipate 2 week LOS to improve pt's ADL/mobility and cognition for her to return home with services.   OT Total Evaluation Time   OT Eval, Low Complexity Minutes (82486) 25   Therapy Certification   Start of Care Date 12/27/24   Certification date from 12/27/24   Certification date to 01/25/25   OT Goals   Therapy Frequency (OT) 5 times/week   OT Predicted Duration/Target Date for Goal Attainment 01/10/25  (monitor LOS, lengthen if pt progress is slow)   OT Goals Hygiene/Grooming;Lower Body Dressing;Upper Body Dressing;Upper Body Bathing;Lower Body Bathing;Bed Mobility;Transfers;Toilet Transfer/Toileting;Meal Preparation;Cognition   OT: Hygiene/Grooming modified independent   OT: Upper Body Dressing Modified independent   OT: Lower Body Dressing Modified independent   OT: Upper Body Bathing Modified independent  (seated in shower)   OT: Lower Body Bathing Minimal assist  (seated vs standing in shower with gb and sh chair)   OT: Bed Mobility Modified independent   OT: Transfer Supervision/stand-by assist  (walk in shower transfer with sh chair and gb)   OT: Toilet Transfer/Toileting Modified independent   OT: Meal Preparation Modified independent;with simple meal preparation;ambulatory level   OT: Cognitive Patient/caregiver will  verbalize understanding of cognitive assessment results/recommendations as needed for safe discharge planning   Self-Care/Home Management   Self-Care/Home Mgmt/ADL, Compensatory, Meal Prep Minutes (17128) 20   Symptoms Noted During/After Treatment (Meal Preparation/Planning Training) fatigue   Treatment Detail/Skilled Intervention OT: Th assists pt with bed mobility/repositioning and sets pt upright so she can feed herself.  Th provides gr/hyg and dressing supplies but pt is off topic at times and slow to process and slow to respond, off topic at times.  Th provides pt with her call light and she verbalizes understanding re: calling staff is she has needs. Handoff given to pt's aide.   OT Discharge Planning   OT Plan OT: Precautions: fall, O2 (2.5 liters at home per pt) 3 liters per NC, frail/deconditioned, c/o spine pain at OT eval.  Tx: sponge bath, transfer to w/c for sitting at the sink as tolerated.  GG sponge bathing score.   OT Discharge Recommendation (DC Rec) home with home care occupational therapy  (pt may have more needs for support than in the past, she is deconditioned)   OT Rationale for DC Rec Pt has a walk in shower with sh chr and gb and a gb by the toilet   OT Brief overview of current status see clinical impression above   OT Equipment Needed at Discharge   (further assess TBD, no needs identified at eval, see DC rec above for pt's AE in the bathroom)   Total Session Time   Timed Code Treatment Minutes 20   Total Session Time (sum of timed and untimed services) 45   Post Acute Settings Only   What unit is patient on? TCU   OT- Transitional Care Unit Time   Individual Time (minutes) - OT 45   TCU Total Session Time (minutes) - OT 45   TCU Daily Total Session Time   OT TCU Daily Total Session Time 45   Rehab TCU Daily Total Session Time 45   Upper Body Dressing - Ability to dress/undress above the waist, including fasteners   Describe Performance OT: max A   Lower Body Dressing - Ability to  dress/undress below the waist, includes fasteners   Describe Performance OT: max A   Lower Body Dressing (Putting On/Taking-Off Footwear)   Describe Performance OT: max A   Eating - Ability to use utensils to bring food/liquid to mouth.   Describe Performance OT: set-up   Toileting Hygiene - Ability to maintain perineal hygiene and adjust clothes   Describe Performance OT: NT, per clinical judgement, pt is dependent at this time.  She reports she is using her brief vs getting to the commode.   Toilet transfer - Ability to get on and off a toilet or commode   Describe Performance OT: NT, pt declined OOB therapy due to fatigue. Per  clnical judgement, pt will require min A at this time.   Personal Hygiene - Ability to maintain personal hygiene (combing hair, shaving, apply makeup wash/dry face/hands.   Describe Performance OT: set-up and cues   Oral Hygiene - Ability to use suitable items to clean teeth.   Describe Performance OT: set-up and cues   Tub/Shower Transfer - Ability to get in and out of the tub/shower   Describe Performance OT: Pt restricted to her room, pt has a walk in shower at home and per clinical judgement, pt likely to need mod A at this time due to generalized weakness.

## 2024-12-27 NOTE — PLAN OF CARE
Goal Outcome Evaluation:         Pt alert and oriented, with intermittent confusion, takes time to respond. Denies any untoward signs and symptoms. On O2 support at 2 Lpm via nasal cannula with humidification for comfort. Not OOB this shift. Repositioned every 2 hours or as needed. On regular diet, able to take pills crushed with applesauce. Writer ordered breakfast for tomorrow, on room service with assist. Incontinent to both bladder and bowel needs, on check and change status. R hip dressing for protection, coccyx dressing CDI. Pt had Albumin infusion this shift. Maintained on contact and droplet precaution. Safety measures in place. Call light within reach. Will continue POC.     /42   Pulse 92   Temp 97.4  F (36.3  C) (Axillary)   Resp 16   Wt 37.2 kg (82 lb 0.2 oz)   SpO2 96%   BMI 16.02 kg/m

## 2024-12-27 NOTE — PLAN OF CARE
Goal Outcome Evaluation:      Plan of Care Reviewed With: patient    Overall Patient Progress: no changeOverall Patient Progress: no change    FOCUS/GOAL     Bowel management, Bladder management, Medication management, Wound care management, Medical management, Mobility, Skin integrity, and Safety management     ASSESSMENT, INTERVENTIONS AND CONTINUING PLAN FOR GOAL:     Pt is A&OX3, disoriented to time, calm, & cooperative with care. With intermittent confusion. Denied CP, SOB, & n/v. On 2.5L O2 via NC saturating b/n 93-96%. Pt has infrequent non productive cough & Droplet precaution maintained. Pt transfers A of 1 with walker & GB; was not OOB this shift. Incontinent for both B&B. LBM this shift. Takes meds crushed with applesauce. Dressing to the coccyx & R hip for protection CDI. L lower forearm PIV patent & NS flushed. Pt slept well for most of the shift & no other acute concern. Able to make needs known & call light within reach. Continue with plan of care.

## 2024-12-27 NOTE — H&P
[facility]    History and Physical - Hospitalist Service       Date of Admission:  12/26/2024    Assessment & Plan      Vandana Ivey is a 79-year-old female with a past medical history of pulmonary fibrosis) and hypoxia (on 2-3 L home O2), osteoporosis, IBS, chronic dizziness, history of meningioma s/p resection.  Patient presented to [facility] on 12/18/[facility] with progressive dizziness, increased shortness of breath with workup notable for parainfluenza infection.  She was also treated for community acquired pneumonia thought to be secondary to superimposed bacterial infection from parainfluenza infection.  Patient was also found to have E. coli UTI during hospital stay as well.  For her anemia, patient did receive IV Venofer in the hospital prior to admission to TCU.  Patient was admitted in TCU on 12/26/2024 for ongoing rehabilitation.    # Generalied weakness  -PT, OT and SLP     # Acute on chronic hypoxic respiratory failure secondary to CAP and parainfluenza  # History of pulmonary fibrosis.  -Patient remains on her baseline home O2 of 2-3 L  -Already completed course of antibiotics prior to coming to the TCU.  -Continue to monitor SpO2 to keep O2 sat greater than 90%.    # E. coli UTI-diagnosed in the hospital.  Already completed antibiotic course.  # Normocytic anemia-already received IV iron in the hospital prior to transfer to the TCU.  - Continue q.Weekly CBC checked.    # Osteoporosis-continue PTA alendronate  # History of GERD, hiatal hernia- continue PTA PPI  # Insomnia-continue PTA trazodone 100 mg nightly, mirtazapine 15 mg nightly IBS-continue PTA Bentyl  # Chronic pain-continue PTA pregabalin 50 mg twice daily  # Chronic dizziness-continue PTA cetirizine 10 mg daily; okay for prn meclizine for dizziness   # Chronic dizziness - continue PTA cetrizine   # Probable HFpEF - continue PTA Atorvastatin, ASA, metoprolol   # Sacral ulcer -stage II pressure injury present on  admission.  [facility] nurse consult ordered.  Appreciate recs.          Diet: Regular Diet Adult  Room Service  Snacks/Supplements Adult: Ensure Enlive; With Meals    DVT Prophylaxis: Pneumatic Compression Devices  Burns Catheter: Not present  Lines: None     Cardiac Monitoring: None  Code Status: No CPR- Do NOT Intubate      Clinically Significant Risk Factors Present on Admission          # Hypochloremia: Lowest Cl = 95 mmol/L in last 2 days, will monitor as appropriate        # Drug Induced Platelet Defect: home medication list includes an antiplatelet medication          # Anemia: based on hgb <11       # Cachexia: Estimated body mass index is 16.02 kg/m  as calculated from the following:    Height as of 12/19/24: 1.524 m (5').    Weight as of this encounter: 37.2 kg (82 lb 0.2 oz).         # Financial/Environmental Concerns:         Immunization History   Administered Date(s) Administered    COVID-19 12+ (MODERNA) 03/12/2021, 04/09/2021    Influenza (IIV3) PF 10/24/2017    Influenza Vaccine >6 months,quad, PF 09/04/2013, 09/24/2014, 10/08/2015, 10/26/2016, 01/13/2023    Influenza, Split Virus, Trivalent, Pf (Fluzone\Fluarix) 10/22/2012    Pneumo Conj 13-V (2010&after) 09/16/2019    Pneumococcal 23 valent 10/16/2013    TDAP (Adacel,Boostrix) 07/24/2015    Td (Adult), Adsorbed 01/10/2003     Pneumococcal Vaccine statis   Disposition Plan     Medically Ready for Discharge: Anticipated in 5+ Days           MADHURI RALPH MD  Hospitalist Service  [facility]  Securely message with Jorge (more info)  Text page via [facility] Paging/Directory     ______________________________________________________________________    Chief Complaint   Generalized weakness       History of Present Illness   Vandana Ivey is a 79-year-old female with a past medical history of pulmonary fibrosis) and hypoxia (on 2-3 L home O2), osteoporosis, IBS, chronic dizziness, history of meningioma s/p resection.  Patient presented to  [facility] on 12/18/2024 with progressive dizziness, increased shortness of breath with workup notable for parainfluenza infection.  She was also treated for community acquired pneumonia thought to be secondary to superimposed bacterial infection from parainfluenza infection.  Patient was also found to have E. coli UTI during hospital stay as well.  For her anemia, patient did receive IV Venofer in the hospital prior to admission to TCU.  Patient was admitted in TCU on 12/26/2024 for ongoing rehabilitation.  While in the TCU, patient did have an episode of soft blood pressure in the 80s/40s.  She received IV albumin 50 g x 1 with adequate effect.  Otherwise, she denies any acute complaints.      Past Medical History    No past medical history on file.    Past Surgical History   No past surgical history on file.    Prior to Admission Medications   Prior to Admission Medications   Prescriptions Last Dose Informant Patient Reported? Taking?   FEROSUL 325 (65 Fe) MG tablet   Yes No   Sig: Take 1 tablet by mouth daily at 2 pm.   Thiamine HCl (B-1) 100 MG TABS   Yes No   Sig: Take 1 tablet by mouth daily at 2 pm.   alendronate (FOSAMAX) 70 MG tablet   No No   Sig: Take 1 tablet (70 mg) by mouth every 7 days.   aspirin 81 MG EC tablet   Yes No   Sig: Take 81 mg by mouth daily.   atorvastatin (LIPITOR) 40 MG tablet   No No   Sig: Take 1 tablet (40 mg) by mouth daily at 2 pm.   cetirizine (ZYRTEC) 10 MG tablet   Yes No   Sig: Take 1 tablet by mouth daily at 2 pm.   cyanocobalamin (VITAMIN B-12) 500 MCG tablet   No No   Sig: Take 1 tablet (500 mcg) by mouth daily.   dicyclomine (BENTYL) 10 MG capsule   No No   Sig: Take 1 capsule (10 mg) by mouth 3 times daily.   famotidine (PEPCID) 20 MG tablet   Yes No   Sig: Take 20 mg by mouth 2 times daily as needed (gastric reflux or symptoms).   folic acid (FOLVITE) 1 MG tablet   Yes No   Sig: Take 1 tablet by mouth daily at 2 pm.   guaiFENesin (ROBITUSSIN) 20 mg/mL liquid   No No    Sig: Take 10 mLs (200 mg) by mouth 2 times daily as needed for cough.   ipratropium - albuterol 0.5 mg/2.5 mg/3 mL (DUONEB) 0.5-2.5 (3) MG/3ML neb solution   No No   Sig: Take 1 vial (3 mLs) by nebulization every 4 hours as needed for wheezing.   magnesium 250 MG tablet   Yes No   Sig: Take 1 tablet by mouth daily.   meclizine (ANTIVERT) 12.5 MG tablet   No No   Sig: Take 1 tablet (12.5 mg) by mouth 3 times daily as needed for dizziness.   metoprolol tartrate (LOPRESSOR) 25 MG tablet   No No   Sig: Take 0.5 tablets (12.5 mg) by mouth 2 times daily.   mirtazapine (REMERON) 7.5 MG tablet   No No   Sig: Take 2 tablets (15 mg) by mouth at bedtime.   pantoprazole (PROTONIX) 40 MG EC tablet   No No   Sig: Take 1 tablet (40 mg) by mouth 2 times daily.   polyethylene glycol (MIRALAX) 17 GM/Dose powder   No No   Sig: Take 17 g (1 Capful) by mouth daily as needed for constipation.   pregabalin (LYRICA) 50 MG capsule   No No   Sig: Take 1 capsule (50 mg) by mouth 2 times daily.   traZODone (DESYREL) 100 MG tablet   No No   Sig: Take 1 tablet (100 mg) by mouth at bedtime.   vitamin E 400 units TABS   Yes No   Sig: Take 400 Units by mouth daily.      Facility-Administered Medications: None         3  Physical Exam   Vital Signs: Temp: 97.7  F (36.5  C) Temp src: Axillary BP: 92/47 Pulse: 70   Resp: 16 SpO2: 93 % O2 Device: Nasal cannula Oxygen Delivery: 2.5 LPM  Weight: 82 lbs .18 oz    General Appearance: Thin appearing elderly lady, lying in bed, on supplemental oxygen in no acute distress or discomfort.  HEENT: PERRL: EOMI; moist mucous membrane w/o lesions  Neck: Supple  Pulmonary: Normal work of breathing, fine crackles heard bilateral lung auscultation.  CVS: Regular rhythm, no murmurs, rubs or gallops  GI: BS (+), soft nontender, no rebound or guarding   Extremities: No LE edema   Skin: No rashes or lesions  Neurologic: A&O x3      Medical Decision Making       55 MINUTES SPENT BY ME on the date of service doing chart  review, history, exam, documentation & further activities per the note.      Data   ------------------------- PAST 24 HR DATA REVIEWED -----------------------------------------------    I have personally reviewed the following data over the past 24 hrs:    N/A  \   N/A   / N/A     N/A N/A N/A /  N/A   N/A N/A N/A \       Imaging results reviewed over the past 24 hrs:   No results found for this or any previous visit (from the past 24 hours).

## 2024-12-27 NOTE — PROGRESS NOTES
CLINICAL NUTRITION SERVICES - ASSESSMENT NOTE     Nutrition Prescription    RECOMMENDATIONS FOR MDs/PROVIDERS TO ORDER:  Please notify RD if pt requesting different supplement or if pt would like supplement discontinued    Malnutrition Status:    Unable to determine due to lack of physical exam    Recommendations already ordered by Registered Dietitian (RD):  Continue current supplement order    Future/Additional Recommendations:  Monitor labs, intakes, and weight trends.     REASON FOR ASSESSMENT  Vandana Ivey is a/an 79 year old female assessed by the dietitian for Admission Nutrition Screen positive for an unsure amount of wt loss and decreased appetite.    NUTRITION/MEDICAL HISTORY  History of pulmonary fibrosis) and hypoxia (on 2-3 L home O2), osteoporosis, IBS, chronic dizziness, history of meningioma s/p resection. Patient presented to [facility] on 12/18/2024 with progressive dizziness, increased shortness of breath with workup notable for parainfluenza infection.  She was also treated for community acquired pneumonia thought to be secondary to superimposed bacterial infection from parainfluenza infection. Patient was also found to have E. coli UTI during hospital stay. Patient was admitted in TCU on 12/26/2024 for ongoing rehabilitation.     CURRENT NUTRITION ORDERS  Diet: Regular  Snacks/supplements: Ensure Enlive BID     LABS   12/22/24 08:20 12/24/24 06:20 12/25/24 08:05 12/26/24 08:34   Sodium 140 137 139 137   Potassium 3.9 4.5 4.8 4.3   Urea Nitrogen 6.7 (L) 8.8 11.2 12.6   Creatinine 0.46 (L) 0.48 (L) 0.46 (L) 0.44 (L)   Magnesium   1.9    Phosphorus   3.0    Glucose 97 121 (H) 108 (H) 102 (H)   Iron  11 (L)     Iron Binding Capacity  134 (L)     Iron Sat Index  8 (L)     Transferrin  113.0 (L)       MEDICATIONS  Medications reviewed: vitamin B12, ferrous sulfate, folic acid, iron sucrose, mag ox, remeron, multivitamin with minerals, protonix, miralax last given 12/25  IV Fluids over last 7 days:  No    ANTHROPOMETRICS  Height: 152.4 cm (5')  Most Recent Weight: 35.5 kg (78 lb 4.2 oz)   IBW: 45.5 kg (78% IBW)  BMI: Underweight BMI <18.5  Weight History: Pt with limited weight history prior to admission. Unsure of accuracy of weights at TCU . Possibly scale inaccuracies vs true weight loss.    12/27/24 35.5 kg (78 lb 4.2 oz)    12/26/24 37.2 kg (82 lb 0.2 oz)   12/19/24 41.7 kg (91 lb 14.9 oz)   08/22/24 43.6 kg (96 lb 3.2 oz)     Dosing Weight: 37 kg - TCU admission weight    ASSESSED NUTRITION NEEDS  Estimated Energy Needs: 9474-4759 kcals/day 35 - 40 kcal/kg  Justification: Increased needs   Estimated Protein Needs: 44-67 grams protein/day (1.2 - 1.8 grams of pro/kg)  Justification: Increased needs  Estimated Fluid Needs: 1 ml/kcal or per provider pending fluid status    PHYSICAL FINDINGS  See malnutrition section below.  Stage 2 sacral wound, Britton 16    MALNUTRITION  % Intake: Unable to assess  % Weight Loss: Difficult to assess  Subcutaneous Fat Loss: Unable to assess  Muscle Loss: Unable to assess  Fluid Accumulation/Edema: None noted  Malnutrition Diagnosis:  Unable to determine due to lack of physical exam    NUTRITION DIAGNOSIS  Inadequate energy intake related to varied appetite/intakes as evidenced by BMI 15.28 kg/m .      INTERVENTIONS  Implementation  Continue current supplements    Goals  Patient to consume % of nutritionally adequate meal trays TID, or the equivalent with supplements/snacks.     Monitoring/Evaluation  Progress toward goals will be monitored and evaluated per protocol.    Ana María Olivo MS, RDN, LD  TCU/OB/Ortho Clinical Dietitian  Available via phone and Vocera  Phone: [phone]  Vocera: TCU Clinical Dietitian  Weekend/Holiday Vocera: Weekend Holiday Clinical Dietitian [Multi Site Groups]

## 2024-12-28 NOTE — PLAN OF CARE
Aox3, intermittent confusion, VSS 2.5L. incont of B&B. Foam dressing in place on sacral area, barrier cream also applied, Reg/Thin./meds Whole with apple sauce. No acute event noted so far. Will conitnue to monitor.

## 2024-12-28 NOTE — PLAN OF CARE
Goal Outcome Evaluation:      Plan of Care Reviewed With: patient    Overall Patient Progress: no changeOverall Patient Progress: no change    FOCUS/GOAL     Bowel management, Bladder management, Medication management, Wound care management, Medical management, Mobility, Skin integrity, and Safety management     ASSESSMENT, INTERVENTIONS AND CONTINUING PLAN FOR GOAL:     Pt is A&OX3, disoriented to time, calm, & cooperative with care. With intermittent confusion. Denied CP, SOB, & n/v. On 2.5L O2 via NC saturating b/n 93-96%. Pt has infrequent non productive cough & Droplet precaution maintained. Pt transfers A of 1 with walker & GB; was not OOB this shift. Incontinent for both B&B. LBM this shift. Takes meds whole/crushed with applesauce. PRN senna give d/t c/o constipation. Dressing to the coccyx wound & R hip for protection CDI. L lower forearm PIV patent & NS flushed. Pt slept well for most of the shift & no other acute concern. Able to make needs known & call light within reach. Continue with POC.

## 2024-12-29 NOTE — PLAN OF CARE
Goal Outcome Evaluation:      Plan of Care Reviewed With: patient    Overall Patient Progress: no changeOverall Patient Progress: no change    FOCUS/GOAL     Bowel management, Bladder management, Medication management, Wound care management, Medical management, Mobility, Skin integrity, and Safety management     ASSESSMENT, INTERVENTIONS AND CONTINUING PLAN FOR GOAL:     Pt is A&OX3, disoriented to time, calm, & cooperative with care. With intermittent confusion. Denied CP, SOB, & n/v. On 2.5L O2 via NC saturating b/n 94-98%. Pt has infrequent non productive cough & Droplet precaution maintained. Pt transfers A of 1 with walker & GB; was not OOB this shift. Incontinent for both B&B. LBM this shift with rectal stool burden. Takes meds whole/crushed with applesauce. PRN senna give d/t c/o constipation. Dressing to the coccyx wound & R hip for protection CDI. L lower forearm PIV patent & NS flushed. Pt slept well for most of the shift & no other acute concern. Able to make needs known & call light within reach. Continue with plan of care.

## 2024-12-29 NOTE — PROGRESS NOTES
CLINICAL NUTRITION SERVICES - BRIEF NOTE     Received provider consult - Malnutrition     RD already following pt (pt screened for unsure wt loss and decreased appetite). Please see full assessment note from 12/27. Pt has Ensure Enlive ordered twice daily. Appreciate continued encouragement surrounding PO intake. RD will continue to follow.     Carolee Salinas RD, LD  Weekend/Holiday Vocera: Weekend Holiday Clinical Dietitian [Multi Site Groups]

## 2024-12-29 NOTE — PLAN OF CARE
AOX3, int confusion, VSS at 2.5L, denies CP and SOB. Bysacodyl Supp Adm for constipation. LBM noted during shift.. Reg/Thin/Whole with Apple sauce. Activity encouraged but patient declined. Less than 25% OF each meal recorded during shift. Fluid PO encouraged. PIV on RA is patent .  Will continue to monitor.

## 2024-12-29 NOTE — PROGRESS NOTES
12/28/24 1000   Appointment Info   Signing Clinician's Name / Credentials (PT) Carmen Ball, PT, DPT   Rehab Comments (PT) Pt met in bed asleep, but agreeable to mobility with encouragement on waking. Pt primarily responding to questions with simple, short, one word answers, otherwise difficult to understand due to voice quality. Pt's daughter Marcelina present end of session to assist with knowledge of prior level of function.   Living Environment   People in Home alone   Current Living Arrangements apartment   Transportation Anticipated family or friend will provide  (dtr drives pt at baseline)   Living Environment Comments Per pt's daughter, present end of session, pt lives alone in and IND living apartment (on chart review address called [facility]). Pt has walk in shower with shower chair and grab bars and has increasingly needed 4WW since October per her daughter.   Self-Care   Usual Activity Tolerance moderate   Current Activity Tolerance poor   Equipment Currently Used at Home grab bar, tub/shower;grab bar, toilet;shower chair;walker, rolling;other (see comments)  (4WW)   Fall history within last six months no   Activity/Exercise/Self-Care Comment Since October, pt newly needing supplemental O2, but has not improved her energy levels and ability to care for herself back to her baseline of independent in her apartment facility without a device and walking to the dining area. Patient's daughter assists pt with rides and groceries, but works 50 hours a week and cares for her  who is receiving cancer treatments. Marcelina is concerned that pt is not able to discharge home alone, and shares she has an application for an elderly waiver for her, but needs assistance with the paperwork.   General Information   Onset of Illness/Injury or Date of Surgery 12/18/24   Referring Physician Chun Zamora MD   Patient/Family Therapy Goals Statement (PT) to go home   Pertinent History of Current Problem (include  "personal factors and/or comorbidities that impact the POC) Vandana Ivey is a 79-year-old female with a past medical history of pulmonary fibrosis) and hypoxia (on 2-3 L home O2), osteoporosis, IBS, chronic dizziness, history of meningioma s/p resection.  Patient presented to [facility] on 12/18/2024 with progressive dizziness, increased shortness of breath with workup notable for parainfluenza infection.  She was also treated for community acquired pneumonia thought to be secondary to superimposed bacterial infection from parainfluenza infection.  Patient was also found to have E. coli UTI during hospital stay as well.  For her anemia, patient did receive IV Venofer in the hospital prior to admission to TCU.   Existing Precautions/Restrictions fall   General Observations Pt appearing frail and emaciated, primarily keeping eyes closed, but able to respond to questions with increased time, with answers appropriate about 50% of the time.   Cognition   Affect/Mental Status (Cognition) low arousal/lethargic   Orientation Status (Cognition) other (see comments)  (\"I'm in the hospital\" PT oriented pt to being in TCU)   Cognitive Status Comments Pt expressing good judgement; when asked her goal, she stated \"to go home\". When asked if she thinks she could go home alone right now, she replies \"no\".   Pain Assessment   Patient Currently in Pain No   Posture    Posture Kyphosis;Protracted shoulders;Forward head position   Range of Motion (ROM)   Range of Motion ROM is WFL   Strength (Manual Muscle Testing)   Strength Comments Unable to tolerate formal testing in seated nor supine at this time due to fatigue   Bed Mobility   Comment, (Bed Mobility) see GG   Transfers   Comment, (Transfers) see GG   Gait/Stairs (Locomotion)   Comment, (Gait/Stairs) see GG   Balance   Balance Comments mod to min A for sitting balance with RUE support on rail   Sensory Examination   Sensory Perception Comments not tested   Coordination "   Coordination Comments not formally tested   Muscle Tone   Muscle Tone Comments not formally tested   Clinical Impression   Criteria for Skilled Therapeutic Intervention Yes, treatment indicated   PT Diagnosis (PT) impaired functional mobility   Influenced by the following impairments weakness, lethargy, decreased mobility tolerance, pain, confusion   Functional limitations due to impairments bed mobility, transfers, ambulation, sitting/standing balance   Clinical Presentation (PT Evaluation Complexity) evolving   Clinical Presentation Rationale Pt with low BP on admit, improved on examination this session   Clinical Decision Making (Complexity) moderate complexity   Planned Therapy Interventions (PT) balance training;bed mobility training;gait training;home exercise program;patient/family education;ROM (range of motion);stair training;strengthening;transfer training;risk factor education;progressive activity/exercise;home program guidelines;neuromuscular re-education;stretching   Risk & Benefits of therapy have been explained evaluation/treatment results reviewed;care plan/treatment goals reviewed;risks/benefits reviewed;current/potential barriers reviewed;participants voiced agreement with care plan;participants included;patient;daughter   Clinical Impression Comments Pt is an emaciated 79 year old female, functioning far below her baseline of independent with no device in her apartment complex, now needing max A for bed mobility and tolerating sitting edge of bed 2 minutes to fatigue, with confusion and lethargy. Pt will benefit from inpatient PT to improve independence and decreased caregiver burden with mobility   PT Total Evaluation Time   PT Eval, Low Complexity Minutes (16165) 48   Physical Therapy Goals   PT Frequency 5x/week   PT Predicted Duration/Target Date for Goal Attainment 01/25/25   PT Goals Bed Mobility;Transfers;Gait;PT Goal 1;PT Goal 2   PT: Bed Mobility Minimal assist;Supine to/from  sit;Rolling   PT: Transfers Minimal assist;Assistive device;Bed to/from chair;Sit to/from stand   PT: Gait 10 feet;Rolling walker  (chair follow/total A)   PT: Goal 1 Pt will perform car transfer with min A for force production to stand and LE management   PT: Goal 2 Pt will perform HEP with supervision to improve functional independence and safety with mobility at DC   Interventions   Interventions Quick Adds Therapeutic Activity   Therapeutic Activity   Therapeutic Activities: dynamic activities to improve functional performance Minutes (85426) 5   Treatment Detail/Skilled Intervention PT: pt and daughter were educated on expected length of stay and progression of mobility and plan of care and voiced acceptance   PT Discharge Planning   PT Plan PT: progress bed mobility, sitting balance, squat pivot to WC,  // bars   PT Discharge Recommendation (DC Rec) home with assist;home with home care physical therapy   PT Rationale for DC Rec Anticipate pt will need assistance at discharge, with daughter Marcelina reporting current facility unable to provide and family unable to afford out of pocket PCA cares. Interested in assistance with elderly waiver.   PT Brief overview of current status PT: max A bed mobility, min to mod A sitting balance, sitting tolerance 2 mins   PT Equipment Needed at Discharge other (see comments)  (cont to assess)   Physical Therapy Time and Intention   Timed Code Treatment Minutes 5   Total Session Time (sum of timed and untimed services) 53   Post Acute Settings Only   What unit is patient on? TCU   PT - Transitional Care Unit Time   Individual Time (minutes) - PT 53   TCU Total Session Time (minutes) - PT 53   TCU Daily Total Session Time   PT TCU Daily Total Session Time 53   Rehab TCU Daily Total Session Time 53   Bed Mobility: Turning side to side/Roll Left and Right   Describe Performance PT: max A   Bed Mobility: Sit to lying   Describe Performance PT: max A   Bed Mobility: Lying to  sitting on the side of bed   Describe Performance PT: max A   Transfers: Sit to Stand   Reason Not Done Medical condition   Describe Performance poor activity tolerance, lethargy, fatigue, decontioning   Transfers: Chair/Bed transfers   Reason Not Done Medical condition   Describe Performance poor activity tolerance, lethargy, fatigue, decontioning   Walk 10 Feet - Ability to walk once standing   Reason Not Done Medical condition   Describe Performance poor activity tolerance, lethargy, fatigue, decontioning   Walk 10 Feet on uneven surfaces - Ability to walk on various surfaces.   Reason Not Done Medical condition   Describe Performance poor activity tolerance, lethargy, fatigue, decontioning   Walk 50 Feet with Two Turns - Ability to walk at least 50 feet.   Reason Not Done Medical condition   Describe Performance poor activity tolerance, lethargy, fatigue, decontioning   Walk 150 Feet - Ability to walk 150 feet   Reason Not Done Medical condition   Describe Performance poor activity tolerance, lethargy, fatigue, decontioning   Wheel 50 Feet - Ability to move wheelchair/scooter   Reason Not Done Medical condition   Describe Performance poor activity tolerance, lethargy, fatigue, decontioning   Wheel 150 Feet - Ability to move wheelchair/scooter   Reason Not Done Medical condition   Describe Performance poor activity tolerance, lethargy, fatigue, decontioning   1 Step (curb) - Ability to go up/down 1 step/curb.   Reason Not Done Medical condition   Describe Performance poor activity tolerance, lethargy, fatigue, decontioning   4 Steps - Ability to go up/down steps.   Reason Not Done Medical condition   Describe Performance poor activity tolerance, lethargy, fatigue, decontioning   12 Steps - Ability to go up/down steps.   Reason Not Done Medical condition   Describe Performance poor activity tolerance, lethargy, fatigue, decontioning   Car Transfer - Ability to transfer in/out of car or van.   Reason Not Done  Medical condition   Describe Performance poor activity tolerance, lethargy, fatigue, decontioning   Picking up Object - Ability to bend/stoop while standing.   Reason Not Done Medical condition   Describe Performance poor activity tolerance, lethargy, fatigue, decontioning

## 2024-12-29 NOTE — CARE PLAN
Patient recently presented to TCU for ongoing rehabilitation following hospital stay.  Course he has been complicated by some confusion, intermittently.  This morning, she has a Tmax of 100.6. Otherwise blood pressure with management normal limits.  - check COVID/flu/RSV  -Continue to monitor symptomatically      MADHURI RALPH MD  Hospitalist Service  [facility] Transitional Care Unit

## 2024-12-30 NOTE — PLAN OF CARE
VS: /66   Pulse 77   Temp 99  F (37.2  C) (Oral)   Resp 18   Wt 35.5 kg (78 lb 4.2 oz)   SpO2 100%   BMI 15.28 kg/m       O2:  96% 4L O2 via oxy mask, pt has continuous oxy meter on the toe.    Output: Incontinent B&B    Activity: Assist X 1 or 2  ,w/ walker & GB   Pain: Denies   CMS: A&O x 3   Skin & Dressing &LDA L PIV flushed well.   Dressing to the coccyx & R hip for protection CDI.    Diet: Regular, pills whole with apple sauce.   Equipment: Personal belongings, oxy mask   Plan: Will continue to follow POC    Additional Info: Pt is able to make needs known.   Denies CP, SOB, and N/V.   Pt slept well for most of the shift.   At the end of shift, hypoxia episode while changing pt.   Elevated HOB and put 4 L of O2 via oxy mask helped O2 sat up.   Call light within reach.

## 2024-12-30 NOTE — PROGRESS NOTES
SW called daughter/Marcelina.  Marcelina is coming out to see pt.  SW will met her when she comes.     SW met with Daughter/ . We went over next steps.  SW encouraged them to fill out MA application. They will see what [facility] sent them.  Otherwise, SW will fill out LTC application. SW will ask providers for consult for Palliative care.     ROLANDA Sparks   [facility], Transitional Care Unit   Social Work   [facility] [place] [address], [address]th [place] [address]  ) [phone]

## 2024-12-30 NOTE — PLAN OF CARE
AOX3, Int Conf, VSS at 2.5L, Denies CP and SOB. Incont of B&B. A1/w/wc for transfer. Patient Repo Q2hrs . Foam dressing in place on R thigh and Coccyx area. Patient ate 25% of each meal. LA PIV in place and SL.Will continue to monitor.

## 2024-12-30 NOTE — PLAN OF CARE
MDS Pain Assessment    The following is the pain interview as conducted by the TCU RN caring for the patient on December 30, 2024. This assessment is required by the [facility] for all patients in [place] SNF (Skilled Nursing Facilities).     . Pain Presence  Have you had pain or hurting at any time in the last 5 days?   1. Yes    . Pain Frequency  How much of the time have you experienced pain or hurting over the last 5 days?   2. Occasionally    . Pain Effect on Sleep  Over the past 5 days, how much of the time has pain made it hard for you to sleep at night?   1. Rarely or not at all    . Pain Interference with Therapy Activities  Over the past 5 days, how often have you limited your participation in rehabilitation therapy sessions due to pain?   1. Rarely or not at all    . Pain Interference with Day-to-Day Activities  Over the past 5 days, have you limited your day-to-day activities (excluding rehabilitation therapy sessions) because of pain?  1. Rarely or not at all    . Pain intensity   Numeric Rating Scale (00-10): Please rate your worst pain over the last 5 days on a zero to ten scale, with zero being no pain and ten as the worst pain you can imagine. 05

## 2024-12-30 NOTE — PLAN OF CARE
No nursing concerns   Labs reviewed . Stable . Creatinine 0.4 and hb 8.9  02 requirements improving form 5 to 3 L  BP transiently dropped with therapy but recovered with out intervention .  CTM  Discussed in team rounds

## 2024-12-30 NOTE — PLAN OF CARE
VS: BP (!) 89/44   Pulse 89   Temp 97.5  F (36.4  C) (Oral)   Resp 20   Wt 35.5 kg (78 lb 4.2 oz)   SpO2 93%   BMI 15.28 kg/m      O2: 93% on 3L O2 via NC   Output: Incontinent bowel and bladder   Last BM: 12/30/24   Activity: A2 w/pivot transfer   Skin: Sacral wound, dressing on R hip for protection   Pain: C/o lower back pain and    CMS:  Neuro: Alert and oriented x3, disoriented to time with intermittent confusion. Trails off during sentences.    Dressing: Dressing to sacrum changed, dressing on R thigh CDI, L PIV CDI   Diet: Regular diet, thin liquids, takes meds whole/crushed with apple sauce   LDA: L PIV, saline locked   Equipment: Wheelchair, call light, GB   Plan: Con't POC.    Additional Info: Patient was calm and cooperative with all cares, denies SOB and CP. Patient weaned off of O2 from 5L to 3L during shift. Patient's BP dropped during therapy. No acute issues this shift, continue POC.    Goal Outcome Evaluation:      Plan of Care Reviewed With: patient    Overall Patient Progress: no changeOverall Patient Progress: no change       Patient's most recent vital signs are:     Vital signs:  BP: 89/44  Temp: 97.5  HR: 89  RR: 20  SpO2: 93 %     Patient does not have new respiratory symptoms.  Patient does not have new sore throat.  Patient does not have a fever greater than 99.5.

## 2024-12-31 NOTE — PROGRESS NOTES
Pt met in bed, asleep. On waking, refusing mobility, despite education on importance of consistent strengthening for progression to discharge. Of note, BP 87/46. RN aware. Pt rotated quarter turn to L with pillows under R side for pressure relief. Pt's daughter, Marcelina present with her in room at time of PT exit.

## 2024-12-31 NOTE — PROGRESS NOTES
BRETT reiceved a vm from daughter asking for LTC application.  SW printed that out as well as Verification form, A Rep form.     ROLANDA Sparks   [facility], Transitional Care Unit   Social Work   [address] [place] [address], [address]th [place] [address]  () [phone]

## 2024-12-31 NOTE — PROGRESS NOTES
[facility]  WO Nurse Inpatient Assessment     Consulted for: coccyx (sacrum)    Summary: Sacrum wound stage 2 present on admit.     12/26: NO assessment. Chart reviewed, no new history available to update at this time. Last WO assessment   12/24. Added wound care per last WO note. Will reassess next week.   12/31: Pressure injury resurfaced on WOC assessment.      Patient History (according to provider note(s):      Vandana Ivey is a 79 year old female with history most notable for pulmonary fibrosis with chronic home O2 of 2.5 L, meningioma status postresection, hiatal hernia s/p repair, IBS, chronic dizziness, osteoporosis who presented from home with progressive dizziness and shortness of breath admitted to [facility] for sepsis.  Workup notable for parainfluenza infection on respiratory viral panel as well as E. coli urinary tract infection and findings concerning for bacterial pneumonia.  Patient transferred from [facility] to [facility] on 12/19.  Patient admitted for acute on chronic respiratory failure secondary to community-acquired pneumonia and parainfluenza infection complicated by E. coli urinary tract infection.     Assessment:      Areas visualized during today's visit: Sacrum/coccyx    Pressure Injury Location: sacrum     12/24 12/31    Last photo: 12/31  Wound type: Pressure Injury     Pressure Injury Stage: 2, present on admission   Wound history/plan of care:   Present on admit. Pt states this started over a year ago. She is frail with little fat pad over coccyx/sacral region.     Wound base: 100 % dry epidermis (white area in photo from 12/31 is dry epidermis with barrier paste adhered to it, not open dermis). Will continue with plan of care below for prevention of pressure injury.     Palpation of the wound bed: normal   STATUS: healed  Supplies ordered: discussed with RN and  discussed with patient     My PI Risk Assessment     Sensory Perception: 3 - Slightly Limited     Moisture: 3 - Occasionally moist      Activity: 3 - Walks occasionally      Mobility: 3 - Slightly limited      Nutrition: 3 - Adequate     Friction/Shear: 2 - Potential problem      TOTAL: 18       Treatment Plan:     Sacrum wound: Every 3 days   Cleanse the area with NS and pat dry.  Apply No sting film barrier to periwound skin.  Cover wound with Sacral Mepilex (#[id number])  DO NOT apply barrier cream under mepilex.  Change dressing Q 3 days.  Turn and reposition Q 2hrs side to side only.  Ensure pt has Nigel-cushion while sitting up in the chair.  Ensure air pump on bed is turned on and set to isoflex.     FYI- If pt has constant incontinent loose stools needing dressing changes Q shift please discontinue the Mepilex dressing and apply criticaid barrier paste BID and PRN.        Orders: Reviewed    RECOMMEND PRIMARY TEAM ORDER: None, at this time  Education provided: importance of repositioning, plan of care, and wound progress  Discussed plan of care with: Patient and Nurse  WOC nurse follow-up plan: signing off  Notify WOC if wound(s) deteriorate.  Nursing to notify the Provider(s) and re-consult the WOC Nurse if new skin concern.    DATA:     Current support surface: Standard  Low air loss (GABRIEL pump, Isolibrium, Pulsate)  Containment of urine/stool: Incontinence Protocol  BMI: Body mass index is 15.28 kg/m .   Active diet order: Orders Placed This Encounter      Regular Diet Adult     Output: I/O last 3 completed shifts:  In: 120 [P.O.:120]  Out: -      Labs:   Recent Labs   Lab 12/30/24  0811   HGB 8.9*   WBC 10.2     Pressure injury risk assessment:   Sensory Perception: 3-->slightly limited  Moisture: 3-->occasionally moist  Activity: 3-->walks occasionally  Mobility: 3-->slightly limited  Nutrition: 2-->probably inadequate  Friction and Shear: 1-->problem  Britton Score: 15    Alba Castellon RN CWOCN  Pager no longer  is use, please contact through [employer] group: [facility] Nurse [facility]  Dept. Office Number: [phone]

## 2024-12-31 NOTE — PROGRESS NOTES
12/31/24 0825   Appointment Info   Signing Clinician's Name / Credentials (SLP) Larry Santos MS, [facility]-SLP   Rehab Comments (SLP) -25 eating a.m .meal   Speech, Language, Voice Communication&/or Auditory Processing   Treatment of Speech, Language, Voice Communication&/or Auditory Processing Minutes (00148) 20   Treatment Detail/Skilled Intervention SLP: Pt reported sleeping poorly last night. Attempted to continue CLQT, 1x additional subtest completed- story recall. Recommend administering WAB-R Bedside next session to determine if test participation related to pre-existing language deficit or turly related to fatigue.   SLP Discharge Planning   SLP Plan SLP: WAB-R Bedside to determine if language impairment impacting cog test or truly fatigue. Continue CLQT pending WBA-R findings.   SLP - Transitional Care Unit Time   Individual Time (minutes) - SLP 20   TCU Total Session Time (minutes) - SLP 20   TCU Daily Total Session Time   SLP TCU Daily Total Session Time 20   Rehab TCU Daily Total Session Time 20

## 2024-12-31 NOTE — PROGRESS NOTES
12/30/24 1400   Appointment Info   Signing Clinician's Name / Credentials (PT) Carmen Ball, PT, DPT   Rehab Comments (PT) PT: pt met in bed, alert and willing to participate in mobility   Pain Assessment   Patient Currently in Pain Yes, see Vital Sign flowsheet  (L knee, back - RN aware)   Therapeutic Activity   Therapeutic Activities: dynamic activities to improve functional performance Minutes (99176) 25   Treatment Detail/Skilled Intervention PT: Mod A to walk LEs to edge of bed and mod A to raise trunk to seated. Pt able to sit edge of bed with supervision with feet flat on floor. Once seated, pt complains of dizziness that dissipates but is still present after seated rest. BP taken and 90s/50s. Pt performs mod A stand pivot to L to WC while holding writer's elbows for support, taking very small steps before sitting with min A in WC. Once in WC, pt reports ongoing dizziness. BP taken and 89/44. RN present and aware. PT assisted pt in stand pivot R to bed with min A and pt performs sit to supine with supervision with bed rail for UE support on transition.   PT Discharge Planning   PT Plan PT: progress bed mobility, stand and amb with walker, functional strength   PT Discharge Recommendation (DC Rec) home with assist;home with home care physical therapy   PT Brief overview of current status PT: mod to supervision bed mobility, mod to min A stand pivot, out of bed mobility limited by dizziness and low BP   Physical Therapy Time and Intention   Timed Code Treatment Minutes 25   Total Session Time (sum of timed and untimed services) 25   PT - Transitional Care Unit Time   Individual Time (minutes) - PT 25   TCU Total Session Time (minutes) - PT 25   TCU Daily Total Session Time   PT TCU Daily Total Session Time 25   Rehab TCU Daily Total Session Time 25

## 2024-12-31 NOTE — PROGRESS NOTES
12/31/24 1100   Name of Certified Therapeutic Rec Specialist   Name of Certified Therapeutic Rec Specialist DACIA Simeon   Appointment Type   Type of Therapeutic Rec Session Therapeutic Rec Assessment   General Information   Patient Profile Review See Profile for full history and prior level of function   Pets Dog   Impression   Open to Socializing with Others Reluctant;Other (comments)  (admits to some expressive aphasia)   Treatment Plan   Equipment and Supplies While on Unit None needed   Assessment Brief assessment and MDS questions completed. Pt is on droplet precautions and is very fatigued due to not feeling well. Pt was provided with leisure resource list, declines all at this time.

## 2024-12-31 NOTE — PLAN OF CARE
Goal Outcome Evaluation:      Plan of Care Reviewed With: patient    Overall Patient Progress: no changeOverall Patient Progress: no change       Alert and oriented x3, except to time, with intermittent confusion, needs time to respond, slow-paced speech noted. Denied chest pain, SOB, N/V or headache. On continuous oximetry monitoring, sating between 92-97%, with O2 support at 3 Lpm via nasal cannula. Positioned and turned as needed. Buttocks and legs off-loaded on pillows. With L PIV, saline locked, dressing CDI. Sacral mepilex CDI. With R thigh foam dressing for protection. Assisted during meals, able to take crushed pills and whole capsules with applesauce. Assist of 1-2 stand pivot to wheelchair, not OOB this shift. Checked and changed for bowel and bladder needs. Incontinence care provided.  Call light within reach. Safety measures in place. Maintained on contact and droplet precaution. Family came to visit. Will continue POC.      Patient's most recent vital signs are:     Vital signs:  BP: 108/54  Temp: 98.3  HR: 72  RR: 20  SpO2: 97 %     Patient does not have new respiratory symptoms.  Patient does not have new sore throat.  Patient does not have a fever greater than 99.5.

## 2024-12-31 NOTE — PROGRESS NOTES
"[facility] Transitional Care    Medicine Progress Note - Hospitalist Service    Date of Admission:  12/26/2024    Assessment & Plan     Vandana Ivey is a 79-year-old female with a past medical history of pulmonary fibrosis) and hypoxia (on 2-3 L home O2), osteoporosis, IBS, chronic dizziness, history of meningioma s/p resection.  Patient presented to [facility] on 12/18/2024 with progressive dizziness, increased shortness of breath with workup notable for parainfluenza infection.  She was also treated for community acquired pneumonia thought to be secondary to superimposed bacterial infection from parainfluenza infection.  Patient was also found to have E. coli UTI during hospital stay as well.  For her anemia, patient did receive IV Venofer in the hospital prior to admission to TCU.  Patient was admitted in TCU on 12/26/2024 for ongoing rehabilitation.     12/31:  - fluid bolus  - monitor closely for need to transfer to EDF  - discussed with  nursing  - no increase in O2 needs  - very thin  - no fever   - continue tamiflu 75 mg daily for PPX  fornow     - 12/28 negative COVID influenza RSV       # hypotension  - blood pressure  80-90s, does not have great oral intake  - 250 ml fluid bolus   - hold metoprolol     # Generalied weakness  -PT, OT and SLP      # Acute on chronic hypoxic respiratory failure secondary to CAP and parainfluenza ( tested + 12/18)   # History of idiopathic pulmonary fibrosis.  - follows by pulmonary, had ILD panel , anti fibrotic therapy held off  due to severity of disease and GI symptoms , will need follow up    - per pulmonary \"  PFT demonstrates 42% predicted  FVC, unable to obtain DLCO. 6MWT demonstrates extremely poor functional capacity. Detailed review of all CT imaging shows diffuse fibrosis throughout R lung with almost complete destruction of L lung, suggests a pattern of aspiration related fibrosis which has continued to progress. \"  -Patient remains on her baseline home " O2 of 2-3 L  -Already completed course of antibiotics prior to coming to the TCU.  -Continue to monitor SpO2 to keep O2 sat greater than 90%.  - no increase in O2 needs      # E. coli UTI  diagnosed in the hospital.  Already completed antibiotic course.    # Probable HFpEF   - continue PTA Atorvastatin, ASA, metoprolol  with holding parameters  - last echo 12/18/24 EF 60-65% , has severe left atrial enlargement , mild pulmonary hypertension  45 mmHg      # Normocytic anemia  -already received IV iron in the hospital prior to transfer to the TCU.  - Continue q.Weekly CBC checked.     # Osteoporosis  -continue PTA alendronate    # History of GERD,  eosinophilic esophagitis   #History gastroparesis   #history of irritable bowel with constipation  # history large h/h ,paraesophageal  hernia  status post  repair 2020    # history of pancreatic insufficiency   - continue PTA PPI  - had been on creon but GI rec to [facility] for a while 10/2024  - was on reglan 2.5 mg tid with meals, consider starting back      # Insomnia  continue PTA trazodone 100 mg nightly, mirtazapine 15 mg nightly IBS-continue PTA Bentyl    # Chronic pain  -continue PTA pregabalin 50 mg twice daily    # Chronic dizziness, benign paroxysmal vertigo   continue PTA cetirizine 10 mg daily; okay for prn meclizine for dizziness     # history meningeoma   - status post  resection in 2020     # Sacral ulcer  stage II pressure injury present on admission.  [facility] nurse consult ordered.  Appreciate recs.        Diet: Regular Diet Adult  Room Service  Snacks/Supplements Adult: Ensure Enlive; With Meals    DVT Prophylaxis: ambulate , aspirin   Burns Catheter: Not present  Lines: None     Cardiac Monitoring: None  Code Status: No CPR- Do NOT Intubate      Clinically Significant Risk Factors          # Hypochloremia: Lowest Cl = 97 mmol/L in last 2 days, will monitor as appropriate                     # Cachexia: Estimated body mass index is 15.28 kg/m  as calculated from  the following:    Height as of 12/19/24: 1.524 m (5').    Weight as of this encounter: 35.5 kg (78 lb 4.2 oz).      # Financial/Environmental Concerns:           Social Drivers of Health            Disposition Plan     Medically Ready for Discharge: Anticipated in 5+ Days             Theresa Maldonado MD  Hospitalist Service  [facility] Transitional Care  Securely message with [employer] (more info)  Text page via [employer] Paging/Directory   ______________________________________________________________________    Interval History   In bed, denies feeling dizzy, no chest pain  , on O2 but on at home, denies any worsening shortness of breath  , seems weak     Physical Exam   Vital Signs: Temp: 98.6  F (37  C) Temp src: Oral BP: 111/50 Pulse: 74   Resp: 17 SpO2: 93 % O2 Device: Nasal cannula Oxygen Delivery: 3 LPM  Weight: 78 lbs 4.21 oz  General appearence: awake alert   but seems weak , in no apparent distress  Very thin, cachectic looking   HEENT: EOMI, PEARLA, sclera nonicteric,  dry mucus membranes,   NECK : supple  RESPIRATORY: lungs clear t, no  wheezing or crackles   CARDIOVASCULAR:S1 S2 regular rate and rhythm,   GASTROINTESTINAL:soft, non-distended , non-tender , + bowel sounds, \  SKIN: warm and dry, no mottling noted   NEUROLOGIC; awake alert and oriented, no focal deficits found  EXTREMITIES: no clubbing, cyanosis or edema ,           Data         Imaging results reviewed over the past 24 hrs:   No results found for this or any previous visit (from the past 24 hours).

## 2025-01-01 ENCOUNTER — PATIENT OUTREACH (OUTPATIENT)
Dept: CARE COORDINATION | Facility: CLINIC | Age: 80
End: 2025-01-01
Payer: MEDICARE

## 2025-01-01 VITALS
OXYGEN SATURATION: 90 % | RESPIRATION RATE: 18 BRPM | WEIGHT: 78.26 LBS | DIASTOLIC BLOOD PRESSURE: 58 MMHG | TEMPERATURE: 98 F | SYSTOLIC BLOOD PRESSURE: 116 MMHG | HEART RATE: 76 BPM | BODY MASS INDEX: 15.28 KG/M2

## 2025-01-01 LAB
BASOPHILS # BLD AUTO: 0.1 10E3/UL (ref 0–0.2)
BASOPHILS NFR BLD AUTO: 1 %
EOSINOPHIL # BLD AUTO: 0.2 10E3/UL (ref 0–0.7)
EOSINOPHIL NFR BLD AUTO: 2 %
ERYTHROCYTE [DISTWIDTH] IN BLOOD BY AUTOMATED COUNT: 16.8 % (ref 10–15)
HCT VFR BLD AUTO: 29.7 % (ref 35–47)
HGB BLD-MCNC: 9 G/DL (ref 11.7–15.7)
HOLD SPECIMEN: NORMAL
IMM GRANULOCYTES # BLD: 0 10E3/UL
IMM GRANULOCYTES NFR BLD: 0 %
LYMPHOCYTES # BLD AUTO: 0.6 10E3/UL (ref 0.8–5.3)
LYMPHOCYTES NFR BLD AUTO: 6 %
MCH RBC QN AUTO: 26.2 PG (ref 26.5–33)
MCHC RBC AUTO-ENTMCNC: 30.3 G/DL (ref 31.5–36.5)
MCV RBC AUTO: 87 FL (ref 78–100)
MONOCYTES # BLD AUTO: 1.1 10E3/UL (ref 0–1.3)
MONOCYTES NFR BLD AUTO: 12 %
NEUTROPHILS # BLD AUTO: 7.5 10E3/UL (ref 1.6–8.3)
NEUTROPHILS NFR BLD AUTO: 79 %
NRBC # BLD AUTO: 0 10E3/UL
NRBC BLD AUTO-RTO: 0 /100
PLATELET # BLD AUTO: 265 10E3/UL (ref 150–450)
RBC # BLD AUTO: 3.43 10E6/UL (ref 3.8–5.2)
WBC # BLD AUTO: 9.5 10E3/UL (ref 4–11)

## 2025-01-01 PROCEDURE — 120N000009 HC R&B SNF

## 2025-01-01 PROCEDURE — 85004 AUTOMATED DIFF WBC COUNT: CPT | Performed by: INTERNAL MEDICINE

## 2025-01-01 PROCEDURE — 36415 COLL VENOUS BLD VENIPUNCTURE: CPT | Performed by: INTERNAL MEDICINE

## 2025-01-01 PROCEDURE — 250N000013 HC RX MED GY IP 250 OP 250 PS 637: Performed by: INTERNAL MEDICINE

## 2025-01-01 PROCEDURE — 022N000001 HC SNF RUG CODE OPNP

## 2025-01-01 RX ADMIN — PANTOPRAZOLE SODIUM 40 MG: 40 TABLET, DELAYED RELEASE ORAL at 16:22

## 2025-01-01 RX ADMIN — PREGABALIN 50 MG: 50 CAPSULE ORAL at 08:25

## 2025-01-01 RX ADMIN — POLYETHYLENE GLYCOL 3350 17 G: 17 POWDER, FOR SOLUTION ORAL at 08:25

## 2025-01-01 RX ADMIN — ASPIRIN 81 MG: 81 TABLET, COATED ORAL at 08:25

## 2025-01-01 RX ADMIN — MAGNESIUM OXIDE TAB 400 MG (241.3 MG ELEMENTAL MG) 400 MG: 400 (241.3 MG) TAB at 08:25

## 2025-01-01 RX ADMIN — Medication 1 TABLET: at 08:25

## 2025-01-01 RX ADMIN — TRAZODONE HYDROCHLORIDE 100 MG: 50 TABLET ORAL at 21:08

## 2025-01-01 RX ADMIN — CYANOCOBALAMIN TAB 500 MCG 500 MCG: 500 TAB at 08:25

## 2025-01-01 RX ADMIN — OSELTAMIVIR PHOSPHATE 30 MG: 30 CAPSULE ORAL at 08:25

## 2025-01-01 RX ADMIN — THIAMINE HCL TAB 100 MG 100 MG: 100 TAB at 13:13

## 2025-01-01 RX ADMIN — MIRTAZAPINE 15 MG: 7.5 TABLET, FILM COATED ORAL at 21:07

## 2025-01-01 RX ADMIN — DICYCLOMINE HYDROCHLORIDE 10 MG: 10 CAPSULE ORAL at 08:25

## 2025-01-01 RX ADMIN — PREGABALIN 50 MG: 50 CAPSULE ORAL at 21:08

## 2025-01-01 RX ADMIN — FOLIC ACID 1000 MCG: 1 TABLET ORAL at 08:25

## 2025-01-01 RX ADMIN — CETIRIZINE HYDROCHLORIDE 10 MG: 5 TABLET ORAL at 13:13

## 2025-01-01 RX ADMIN — PANTOPRAZOLE SODIUM 40 MG: 40 TABLET, DELAYED RELEASE ORAL at 08:25

## 2025-01-01 RX ADMIN — FERROUS SULFATE TAB 325 MG (65 MG ELEMENTAL FE) 325 MG: 325 (65 FE) TAB at 08:25

## 2025-01-01 RX ADMIN — ATORVASTATIN CALCIUM 40 MG: 40 TABLET, FILM COATED ORAL at 08:25

## 2025-01-01 RX ADMIN — DICYCLOMINE HYDROCHLORIDE 10 MG: 10 CAPSULE ORAL at 21:08

## 2025-01-01 RX ADMIN — DICYCLOMINE HYDROCHLORIDE 10 MG: 10 CAPSULE ORAL at 13:13

## 2025-01-01 ASSESSMENT — ACTIVITIES OF DAILY LIVING (ADL)
ADLS_ACUITY_SCORE: 77

## 2025-01-01 NOTE — PLAN OF CARE
Aox2/3 int confusion. VSS at 3L. Denies CP and SOB. A1/walker for transfer. Incontinent of B&B. LBM today. Low BP noted in the evening. Bolus lactate ringer given. Reg/Thin/Meds whole with apple sauce. PM nurse to continue to monitor.

## 2025-01-01 NOTE — PLAN OF CARE
Goal Outcome Evaluation:    Alert to self, A2 lift. Needs to be anticipated.  Denied pain at night.  Incontinent of bowel and bladder, LBM12/31.  3L O2 via NC.  No acute changes, continue with POC.    /50   Pulse 64   Temp 97.2  F (36.2  C) (Oral)   Resp 17   Wt 35.5 kg (78 lb 4.2 oz)   SpO2 99%   BMI 15.28 kg/m      Patient does not have new respiratory symptoms.  Patient does not have new sore throat.  Patient does not have a fever greater than 99.5.

## 2025-01-01 NOTE — PLAN OF CARE
AOX3, int confusion, VSS at  3L. Was changed and repo Q2hrs. Incont of B&B. Denies CP and SOB. Reg/Thin /meds whole with Apple sauce. No Acute event noted so far. Will continue to monitor.

## 2025-01-02 NOTE — DEATH PRONOUNCEMENT
MD DEATH PRONOUNCEMENT    Called to pronounce Vandana Ivey dead.    Physical Exam: Unresponsive to noxious stimuli, Spontaneous respirations absent, Breath sounds absent, Carotid pulse absent, Heart sounds absent, Pupillary light reflex absent, and Corneal blink reflex absent    Patient was pronounced dead at 0605 AM, 2025.    Preliminary Cause of Death: cardiac arrest    Active Problems:    Generalized weakness       Infectious disease present?: no    Communicable disease present? (examples: HIV, chicken pox, TB, Ebola, CJD) :  NO    Multi-drug resistant organism present? (example: MRSA): NO    Please consider an autopsy if any of the following exist:  NO Unexpected or unexplained death during or following any dental, medical, or surgical diagnostic treatment procedures.   NO Death of mother at or up to seven days after delivery.     NO All  and pediatric deaths.     NO Death where the cause is sufficiently obscure to delay completion of the death certificate.   NO Deaths in which autopsy would confirm a suspected illness/condition that would affect surviving family members or recipients of transplanted organs.     The following deaths must be reported to the 's Office:  NO A death that may be due entirely or in part to any factors other than natural disease (recent surgery, recent trauma, suspected abuse/neglect).   NO A death that may be an accident, suicide, or homicide.     NO Any sudden, unexpected death in which there is no prior history of significant heart disease or any other condition associated with sudden death.   NO A death under suspicious, unusual, or unexpected circumstances.    NO Any death which is apparently due to natural causes but in which the  does not have a personal physician familiar with the patient s medical history, social, or environmental situation or the circumstances of the terminal event.   NO Any death apparently due to Sudden Infant Death  Syndrome.     NO Deaths that occur during, in association with, or as consequences of a diagnostic, therapeutic, or anesthetic procedure.   NO Any death in which a fracture of a major bone has occurred within the past (6) six months.   NO A death of persons note seen by their physician within 120 days of demise.     NO Any death in which the  was an inmate of a public institution or was in the custody of Law Enforcement personnel.   NO  All unexpected deaths of children   NO Solid organ donors   NO Unidentified bodies   NO Deaths of persons whose bodies are to be cremated or otherwise disposed of so that the bodies will later be unavailable for examination;   NO Deaths unattended by a physician outside of a licensed healthcare facility or licensed residential hospice program   NO Deaths occurring within 24 hours of arrival to a health care facility if death is unexpected.    NO Deaths associated with the decedent s employment.   NO Deaths attributed to acts of terrorism.   NO Any death in which there is uncertainty as to whether it is a medical examiner s care should be discussed with the medical investigator.        Body disposition: have been unable to reach daughter/emergency contact Marcelina after calling twice. Left 2 voicemails. Attempted to call patient's home phone and no answer.     Will pass on to day team provider so they can keep attempting to reach family.    Billy Ervin, DO

## 2025-01-02 NOTE — PLAN OF CARE
Goal Outcome Evaluation:      Plan of Care Reviewed With: patient    Overall Patient Progress: no changeOverall Patient Progress: no change       VS: Temp: 98  F (36.7  C) Temp src: Oral BP: 116/58 Pulse: 76   Resp: 18 SpO2: 90 % O2 Device: Nasal cannula Oxygen Delivery: 3 LPM     O2: Sats >90% with 3L NC.    Output: Incontinent of B/B. No discomfort with voiding. Passing flatus   Last BM: 12/31/24   Activity: Ax1 with walker/GB. T/R q2hrs   Up for meals? Fair appetite for dinner    Skin: Coccyx    Pain: Denies   CMS: A&Ox2. Disoriented to time and situation.    Dressing: Foam on Coccyx and L hip   Diet: Regula/Thin/whole with apple sauce   LDA: Rt hip and coccyx with foam   Equipment: Personal belongings   Plan: POC   Additional Info: No acute events this shift.    Shift: 0542-5727

## 2025-01-02 NOTE — PLAN OF CARE
Occupational Therapy Discharge Summary    Reason for therapy discharge:    Pt     Progress towards therapy goal(s). See goals on Care Plan in [facility] electronic health record for goal details.  NA    Therapy recommendation(s):    NA, pt .                             Attending Only

## 2025-01-02 NOTE — PLAN OF CARE
Patient's daughter called back returning Dr Cervantes's call. Providers not available this morning. Writer updated daughter of patient's status. Daughter emotional but confirmed that her  was with her. She and her  will come to unit as soon as able.

## 2025-01-02 NOTE — PLAN OF CARE
"Goal Outcome Evaluation:    No signs of respiratory distress noted during rounds, pt was calm when talking and breathing fine. Pt was on O2 3L via NC during shift with O2 sat 91%. At midnight, writer went to do first round check on pt, pt was awake and talking to writer, writer and NA repositioned pt and changing brief. Around 0200, writer and NA checked on pt again and repositioned pt, pt was awake and supported staff with turning. NA checked on pt around 0400, pt was still breathing normal with NC oxygen. Around 0540, writer came to check pt and wanted to repositioned pt again. Writer discovered that pt is not breathing, no breath sound noted, no pulse can be felt, attempted to wake pt up by shaking pt but no response. Writer called NA and CN come to check on pt. Pt's whole body is still warm at that time. Writer called provider right away to notify. Provider came and examined pt and pronounced death at 0605. Staff attempted to contact pt's daughter to notify but no answer.    Writer called organ donation center to notify. Speaker Tiffany said \" pt is not eligible for organ donation, but she might be eligible for eye donation\". Around 0745, \"[facility] center called and said pt is eligible for eye donation. Writer gave speaker Gage pt's daughter phone numbers to contact for further information.           "

## 2025-01-02 NOTE — DISCHARGE SUMMARY
[facility] Transitional Care  Hospitalist Discharge Summary      Date of Admission:  12/26/2024  Augie eof Death : 0605 1/2/2025   Discharging Provider: Theresa Maldonado MD  Discharge Service: Hospitalist Service    Discharge Diagnoses       # Generalied weakness  # failure  to thrive   # cachexia   # Acute on chronic hypoxic respiratory failure secondary to CAP and parainfluenza ( tested + 12/18)   # History of idiopathic pulmonary fibrosis.  # E. coli UTI  # hypotension  # Probable HFpEF   # Normocytic anemia  # Osteoporosis  # History of GERD,  eosinophilic esophagitis   #History gastroparesis   #history of irritable bowel with constipation  # history large h/h ,paraesophageal  hernia  status post  repair 2020    # history of pancreatic insufficiency   # Insomnia  # Chronic pain  # Chronic dizziness, benign paroxysmal vertigo   # history meningeoma   # Sacral ulcer        Clinically Significant Risk Factors     # Cachexia: Estimated body mass index is 15.28 kg/m  as calculated from the following:    Height as of 12/19/24: 1.524 m (5').    Weight as of this encounter: 35.5 kg (78 lb 4.2 oz).       Date of death : 1/2/2025    Hospital Course     Patient  had been at baseline, received her nursing cares, reviewed nursing records and patient  had been seen over night, no distress noted had been awake , described with normal breathing. When nursing went in to reposition her  around 0540 patient  was noted to be not breathing  and no pulse felt  Patient  was pronounced dead at 0605 AM, January 2, 2025.   Daughter is now here at TCU and I did have discussion with her       # hypotension  - blood pressure  80-90s, does not have great oral intake  - blood pressure  responded to 250 ml fluid bolus and remained in low 100s, no worsening of respiratory status        # Generalied weakness  # failure to thrive   #cachexia  - she had PRT Occupational therapy   - was seen by nutritionist, oral intake  remained low        #  "Acute on chronic hypoxic respiratory failure secondary to CAP and parainfluenza ( tested + 12/18)   # History of idiopathic pulmonary fibrosis.  - followed by pulmonary, had ILD panel , anti fibrotic therapy held off  due to severity of disease and GI symptoms     - per pulmonary \"  PFT demonstrates 42% predicted  FVC, unable to obtain DLCO. 6MWT demonstrates extremely poor functional capacity. Detailed review of all CT imaging shows diffuse fibrosis throughout R lung with almost complete destruction of L lung, suggests a pattern of aspiration related fibrosis which has continued to progress. \"  -Patient remained  on her baseline home O2 of 2-3 L  -Already completed course of antibiotics prior to coming to the TCU.  -reviewing nursing notes  form 1/1- 1/2 her respiratory status had not worsened      # E. coli UTI  diagnosed in the hospital. completed antibiotic course.     # Probable HFpEF   - continue PTA Atorvastatin, ASA, metoprolol  with holding parameters  - last echo 12/18/24 EF 60-65% , has severe left atrial enlargement , mild pulmonary hypertension  45 mmHg          # Normocytic anemia  - received IV iron in the hospital prior to transfer to the TCU.       # Osteoporosis  - received  PTA alendronate     # History of GERD,  eosinophilic esophagitis   #History gastroparesis   #history of irritable bowel with constipation  # history large h/h ,paraesophageal  hernia  status post  repair 2020    # history of pancreatic insufficiency   - received  PPI  - had been on creon but GI rec to hold  for a while 10/2024  - PTA was on reglan 2.5 mg tid with meals       # Insomnia  - received  PTA trazodone 100 mg nightly, mirtazapine 15 mg nightly IBS-continue PTA Bentyl     # Chronic pain  - PTA pregabalin 50 mg twice daily     # Chronic dizziness, benign paroxysmal vertigo   - received  PTA cetirizine 10 mg daily;  prn meclizine for dizziness      # history meningeoma   - status post  resection in 2020      # Sacral " ulcer  stage II pressure injury present on admission.   Code Status   No CPR- Do NOT Intubate    Theresa Maldonado MD  [facility] TRANSITIONAL CARE [address] [address]  Phone: [phone]  ______________________________________________________________________

## 2025-01-02 NOTE — PROGRESS NOTES
SW met with daughter and son in law. We talked about the next steps for them.  They decided on [place] [employer].  SW said they can have a celebration of life whenever, they feel ready. SW said to call [employer] right away so they can pull her check and they don't have to worry about paying them back later.  SW suggested they do her taxes but daughter didn't know if she did taxes. They will take what they want from pt's room and they will donate the rest.     ROLANDA Sparks   [facility], Transitional Care Unit   Social Work   [facility] [place] [address]  (ph) [phone]

## 2025-01-02 NOTE — PLAN OF CARE
Physical Therapy Discharge Summary    Reason for therapy discharge:    Patient .    Progress towards therapy goal(s). See goals on Care Plan in [facility] electronic health record for goal details.  N/A    Therapy recommendation(s):    N/A

## 2025-01-02 NOTE — PROGRESS NOTES
Clinic Care Coordination Contact  Care Coordination Clinician Chart Review    Situation: Patient chart reviewed by care coordinator.    Background: Clinic Care Coordination Referral received from inpatient care team for transition handoff communication following hospital admission.    Assessment: Upon chart review, patient is not a candidate for Primary Care Clinic Care Coordination enrollment due to reason stated below:  Patient has passed away.    Plan/Recommendations: Clinic Care Coordination Referral/order cancelled. RN/SW CC will perform no further monitoring/outreaches at this time and will remain available as needed. If new needs arise, a new Care Coordination Referral may be placed.    Karel Felton MSN, RN, PHN, CCM   Primary Care Clinical RN Care Coordinator  [facility]  1/2/2025   11:13 AM  [email or web address]  Office: [phone]

## 2025-01-02 NOTE — PROGRESS NOTES
Speech Language Therapy Discharge Summary    Reason for therapy discharge:    Pt .    Progress towards therapy goal(s). See goals on Care Plan in [facility] electronic health record for goal details.  N/A    Therapy recommendation(s):    N/A

## 2025-01-03 NOTE — PROGRESS NOTES
12/28/24 1000   Appointment Info   Signing Clinician's Name / Credentials (PT) Carmen Ball, PT, DPT   Rehab Comments (PT) Pt met in bed asleep, but agreeable to mobility with encouragement on waking. Pt primarily responding to questions with simple, short, one word answers, otherwise difficult to understand due to voice quality. Pt's daughter Marcelina present end of session to assist with knowledge of prior level of function.   Quick Adds   Quick Adds Certification   Living Environment   People in Home alone   Current Living Arrangements apartment   Transportation Anticipated family or friend will provide  (dtr drives pt at baseline)   Living Environment Comments Per pt's daughter, present end of session, pt lives alone in and IND living apartment (on chart review address called [facility]). Pt has walk in shower with shower chair and grab bars and has increasingly needed 4WW since October per her daughter.   Self-Care   Usual Activity Tolerance moderate   Current Activity Tolerance poor   Equipment Currently Used at Home grab bar, tub/shower;grab bar, toilet;shower chair;walker, rolling;other (see comments)  (4WW)   Fall history within last six months no   Activity/Exercise/Self-Care Comment Since October, pt newly needing supplemental O2, but has not improved her energy levels and ability to care for herself back to her baseline of independent in her apartment facility without a device and walking to the dining area. Patient's daughter assists pt with rides and groceries, but works 50 hours a week and cares for her  who is receiving cancer treatments. Marcelina is concerned that pt is not able to discharge home alone, and shares she has an application for an elderly waiver for her, but needs assistance with the paperwork.   General Information   Onset of Illness/Injury or Date of Surgery 12/18/24   Referring Physician Chun Zamora MD   Patient/Family Therapy Goals Statement (PT) to go home  "  Pertinent History of Current Problem (include personal factors and/or comorbidities that impact the POC) Vandana Ivey is a 79-year-old female with a past medical history of pulmonary fibrosis) and hypoxia (on 2-3 L home O2), osteoporosis, IBS, chronic dizziness, history of meningioma s/p resection.  Patient presented to [facility] on [facility]/18/[facility]24 with progressive dizziness, increased shortness of breath with workup notable for parainfluenza infection.  She was also treated for community acquired pneumonia thought to be secondary to superimposed bacterial infection from parainfluenza infection.  Patient was also found to have E. coli UTI during hospital stay as well.  For her anemia, patient did receive IV Venofer in the hospital prior to admission to TCU.   Existing Precautions/Restrictions fall   General Observations Pt appearing frail and emaciated, primarily keeping eyes closed, but able to respond to questions with increased time, with answers appropriate about 50% of the time.   Cognition   Affect/Mental Status (Cognition) low arousal/lethargic   Orientation Status (Cognition) other (see comments)  (\"I'm in the hospital\" PT oriented pt to being in TCU)   Cognitive Status Comments Pt expressing good judgement; when asked her goal, she stated \"to go home\". When asked if she thinks she could go home alone right now, she replies \"no\".   Pain Assessment   Patient Currently in Pain No   Posture    Posture Kyphosis;Protracted shoulders;Forward head position   Range of Motion (ROM)   Range of Motion ROM is WFL   Strength (Manual Muscle Testing)   Strength Comments Unable to tolerate formal testing in seated nor supine at this time due to fatigue   Bed Mobility   Comment, (Bed Mobility) see GG   Transfers   Comment, (Transfers) see GG   Gait/Stairs (Locomotion)   Comment, (Gait/Stairs) see GG   Balance   Balance Comments mod to min A for sitting balance with RUE support on rail   Sensory Examination   Sensory " Perception Comments not tested   Coordination   Coordination Comments not formally tested   Muscle Tone   Muscle Tone Comments not formally tested   Clinical Impression   Criteria for Skilled Therapeutic Intervention Yes, treatment indicated   PT Diagnosis (PT) impaired functional mobility   Influenced by the following impairments weakness, lethargy, decreased mobility tolerance, pain, confusion   Functional limitations due to impairments bed mobility, transfers, ambulation, sitting/standing balance   Clinical Presentation (PT Evaluation Complexity) evolving   Clinical Presentation Rationale Pt with low BP on admit, improved on examination this session   Clinical Decision Making (Complexity) moderate complexity   Planned Therapy Interventions (PT) balance training;bed mobility training;gait training;home exercise program;patient/family education;ROM (range of motion);stair training;strengthening;transfer training;risk factor education;progressive activity/exercise;home program guidelines;neuromuscular re-education;stretching   Risk & Benefits of therapy have been explained evaluation/treatment results reviewed;care plan/treatment goals reviewed;risks/benefits reviewed;current/potential barriers reviewed;participants voiced agreement with care plan;participants included;patient;daughter   Clinical Impression Comments Pt is an emaciated 79 year old female, functioning far below her baseline of independent with no device in her apartment complex, now needing max A for bed mobility and tolerating sitting edge of bed 2 minutes to fatigue, with confusion and lethargy. Pt will benefit from inpatient PT to improve independence and decreased caregiver burden with mobility   PT Total Evaluation Time   PT Eval, Low Complexity Minutes (16186) 48   Therapy Certification   Start of care date 12/28/24   Certification date from 12/28/24   Certification date to 01/26/25   Medical Diagnosis generalized weakness   Physical Therapy  Goals   PT Frequency 5x/week   PT Predicted Duration/Target Date for Goal Attainment 01/25/25   PT Goals Bed Mobility;Transfers;Gait;PT Goal 1;PT Goal 2   PT: Bed Mobility Minimal assist;Supine to/from sit;Rolling   PT: Transfers Minimal assist;Assistive device;Bed to/from chair;Sit to/from stand   PT: Gait 10 feet;Rolling walker  (chair follow/total A)   PT: Goal 1 Pt will perform car transfer with min A for force production to stand and LE management   PT: Goal 2 Pt will perform HEP with supervision to improve functional independence and safety with mobility at DC   Interventions   Interventions Quick Adds Therapeutic Activity   Therapeutic Activity   Therapeutic Activities: dynamic activities to improve functional performance Minutes (98688) 5   Treatment Detail/Skilled Intervention PT: pt and daughter were educated on expected length of stay and progression of mobility and plan of care and voiced acceptance   PT Discharge Planning   PT Plan PT: progress bed mobility, sitting balance, squat pivot to WC,  // bars   PT Discharge Recommendation (DC Rec) home with assist;home with home care physical therapy   PT Rationale for DC Rec Anticipate pt will need assistance at discharge, with daughter Marcelina reporting current facility unable to provide and family unable to afford out of pocket PCA cares. Interested in assistance with elderly waiver.   PT Brief overview of current status PT: max A bed mobility, min to mod A sitting balance, sitting tolerance 2 mins   PT Equipment Needed at Discharge other (see comments)  (cont to assess)   Physical Therapy Time and Intention   Timed Code Treatment Minutes 5   Total Session Time (sum of timed and untimed services) 53   Post Acute Settings Only   What unit is patient on? TCU   PT - Transitional Care Unit Time   Individual Time (minutes) - PT 53   TCU Total Session Time (minutes) - PT 53   TCU Daily Total Session Time   PT TCU Daily Total Session Time 53   Rehab TCU  Daily Total Session Time 53   Bed Mobility: Turning side to side/Roll Left and Right   Describe Performance PT: max A   Bed Mobility: Sit to lying   Describe Performance PT: max A   Bed Mobility: Lying to sitting on the side of bed   Describe Performance PT: max A   Transfers: Sit to Stand   Reason Not Done Medical condition   Describe Performance poor activity tolerance, lethargy, fatigue, decontioning   Transfers: Chair/Bed transfers   Reason Not Done Medical condition   Describe Performance poor activity tolerance, lethargy, fatigue, decontioning   Walk 10 Feet - Ability to walk once standing   Reason Not Done Medical condition   Describe Performance poor activity tolerance, lethargy, fatigue, decontioning   Walk 10 Feet on uneven surfaces - Ability to walk on various surfaces.   Reason Not Done Medical condition   Describe Performance poor activity tolerance, lethargy, fatigue, decontioning   Walk 50 Feet with Two Turns - Ability to walk at least 50 feet.   Reason Not Done Medical condition   Describe Performance poor activity tolerance, lethargy, fatigue, decontioning   Walk 150 Feet - Ability to walk 150 feet   Reason Not Done Medical condition   Describe Performance poor activity tolerance, lethargy, fatigue, decontioning   Wheel 50 Feet - Ability to move wheelchair/scooter   Reason Not Done Medical condition   Describe Performance poor activity tolerance, lethargy, fatigue, decontioning   Wheel 150 Feet - Ability to move wheelchair/scooter   Reason Not Done Medical condition   Describe Performance poor activity tolerance, lethargy, fatigue, decontioning   1 Step (curb) - Ability to go up/down 1 step/curb.   Reason Not Done Medical condition   Describe Performance poor activity tolerance, lethargy, fatigue, decontioning   4 Steps - Ability to go up/down steps.   Reason Not Done Medical condition   Describe Performance poor activity tolerance, lethargy, fatigue, decontioning   12 Steps - Ability to go up/down  steps.   Reason Not Done Medical condition   Describe Performance poor activity tolerance, lethargy, fatigue, decontioning   Car Transfer - Ability to transfer in/out of car or van.   Reason Not Done Medical condition   Describe Performance poor activity tolerance, lethargy, fatigue, decontioning   Picking up Object - Ability to bend/stoop while standing.   Reason Not Done Medical condition   Describe Performance poor activity tolerance, lethargy, fatigue, decontioning